# Patient Record
Sex: FEMALE | Race: WHITE | NOT HISPANIC OR LATINO | Employment: OTHER | URBAN - METROPOLITAN AREA
[De-identification: names, ages, dates, MRNs, and addresses within clinical notes are randomized per-mention and may not be internally consistent; named-entity substitution may affect disease eponyms.]

---

## 2017-01-17 RX ORDER — BUSPIRONE HYDROCHLORIDE 5 MG/1
TABLET ORAL
Qty: 60 TABLET | Refills: 0 | Status: SHIPPED | OUTPATIENT
Start: 2017-01-17 | End: 2017-03-19 | Stop reason: SDUPTHER

## 2017-01-18 RX ORDER — ATORVASTATIN CALCIUM 10 MG/1
TABLET, FILM COATED ORAL
Qty: 90 TABLET | Refills: 0 | Status: SHIPPED | OUTPATIENT
Start: 2017-01-18 | End: 2017-04-16 | Stop reason: SDUPTHER

## 2017-01-30 RX ORDER — POTASSIUM CHLORIDE 750 MG/1
CAPSULE, EXTENDED RELEASE ORAL
Qty: 180 CAPSULE | Refills: 0 | Status: SHIPPED | OUTPATIENT
Start: 2017-01-30 | End: 2017-05-12 | Stop reason: SDUPTHER

## 2017-02-22 RX ORDER — METOPROLOL SUCCINATE 50 MG/1
TABLET, EXTENDED RELEASE ORAL
Qty: 90 TABLET | Refills: 0 | Status: SHIPPED | OUTPATIENT
Start: 2017-02-22 | End: 2017-05-21 | Stop reason: SDUPTHER

## 2017-02-22 RX ORDER — LEVOTHYROXINE SODIUM 0.05 MG/1
TABLET ORAL
Qty: 90 TABLET | Refills: 0 | Status: SHIPPED | OUTPATIENT
Start: 2017-02-22 | End: 2017-05-22 | Stop reason: SDUPTHER

## 2017-03-20 RX ORDER — BUSPIRONE HYDROCHLORIDE 5 MG/1
TABLET ORAL
Qty: 60 TABLET | Refills: 0 | Status: SHIPPED | OUTPATIENT
Start: 2017-03-20 | End: 2017-04-16 | Stop reason: SDUPTHER

## 2017-03-30 RX ORDER — LOSARTAN POTASSIUM 50 MG/1
TABLET ORAL
Qty: 90 TABLET | Refills: 0 | Status: SHIPPED | OUTPATIENT
Start: 2017-03-30 | End: 2018-02-12 | Stop reason: SDUPTHER

## 2017-03-30 RX ORDER — SPIRONOLACTONE 25 MG/1
TABLET ORAL
Qty: 90 TABLET | Refills: 0 | Status: SHIPPED | OUTPATIENT
Start: 2017-03-30 | End: 2017-06-26 | Stop reason: SDUPTHER

## 2017-04-17 RX ORDER — ATORVASTATIN CALCIUM 10 MG/1
TABLET, FILM COATED ORAL
Qty: 90 TABLET | Refills: 0 | Status: SHIPPED | OUTPATIENT
Start: 2017-04-17 | End: 2017-07-14 | Stop reason: SDUPTHER

## 2017-04-17 RX ORDER — BUSPIRONE HYDROCHLORIDE 5 MG/1
TABLET ORAL
Qty: 60 TABLET | Refills: 0 | Status: SHIPPED | OUTPATIENT
Start: 2017-04-17 | End: 2017-05-15 | Stop reason: SDUPTHER

## 2017-04-27 ENCOUNTER — OFFICE VISIT (OUTPATIENT)
Dept: INTERNAL MEDICINE | Facility: CLINIC | Age: 72
End: 2017-04-27

## 2017-04-27 VITALS
RESPIRATION RATE: 16 BRPM | BODY MASS INDEX: 21.66 KG/M2 | WEIGHT: 130 LBS | TEMPERATURE: 98.9 F | HEART RATE: 68 BPM | HEIGHT: 65 IN | OXYGEN SATURATION: 95 % | SYSTOLIC BLOOD PRESSURE: 110 MMHG | DIASTOLIC BLOOD PRESSURE: 58 MMHG

## 2017-04-27 DIAGNOSIS — J40 BRONCHITIS: Primary | ICD-10-CM

## 2017-04-27 PROCEDURE — 99213 OFFICE O/P EST LOW 20 MIN: CPT | Performed by: NURSE PRACTITIONER

## 2017-04-27 RX ORDER — METHYLPREDNISOLONE 4 MG/1
TABLET ORAL
Qty: 21 TABLET | Refills: 0 | Status: SHIPPED | OUTPATIENT
Start: 2017-04-27 | End: 2017-05-10

## 2017-04-27 RX ORDER — AZITHROMYCIN 250 MG/1
TABLET, FILM COATED ORAL
Qty: 6 TABLET | Refills: 0 | Status: SHIPPED | OUTPATIENT
Start: 2017-04-27 | End: 2017-05-10

## 2017-04-27 NOTE — PROGRESS NOTES
Vitals:    04/27/17 1115   BP: 110/58   Pulse: 68   Resp: 16   Temp: 98.9 °F (37.2 °C)   SpO2: 95%     Last 2 weights    04/27/17  1115   Weight: 130 lb (59 kg)     Social History   Substance Use Topics   • Smoking status: Never Smoker   • Smokeless tobacco: Never Used   • Alcohol use No       Subjective     HPI  Pt presents to office today with 4 days of productive cough, fatigue, feeling run down, wheezing, difficulty sleeping. Pt states her  was also sick about a week ago and dx with PNA. She is afraid she may have gotten it from him. Pt denies fever, URI symptoms, n/v. She has tried robitussin dm OTC without much relief,     The following portions of the patient's history were reviewed and updated as appropriate: allergies, current medications, past medical history, past social history and problem list.    Review of Systems   Constitutional: Positive for fatigue.   Respiratory: Positive for cough (productive) and wheezing.    Cardiovascular: Negative.        Objective     Physical Exam   Constitutional: She is oriented to person, place, and time. She appears well-developed and well-nourished. She has a sickly appearance.   HENT:   Head: Normocephalic and atraumatic.   Neck: Normal range of motion.   Cardiovascular: Normal rate, regular rhythm and normal heart sounds.    Pulmonary/Chest: Effort normal. She has wheezes (inspriatory) in the right upper field, the right middle field, the left upper field and the left middle field.   Musculoskeletal: Normal range of motion.   Neurological: She is alert and oriented to person, place, and time.   Nursing note and vitals reviewed.      Assessment/Plan   Karly was seen today for cough and uri.    Diagnoses and all orders for this visit:    Bronchitis    Other orders  -     azithromycin (ZITHROMAX) 250 MG tablet; Take 2 tablets the first day, then 1 tablet daily for 4 days.  -     MethylPREDNISolone (MEDROL, ALEKSANDR,) 4 MG tablet; Take as directed on package  instructions.         -antibiotics, steroid pack, and cheratussin ac 10 mg po q4-6hr prn cough signed by johninger  -rest, increase fluid intake  -cont home meds  -FU prn or if symptoms persist/worsen

## 2017-05-10 ENCOUNTER — OFFICE VISIT (OUTPATIENT)
Dept: INTERNAL MEDICINE | Facility: CLINIC | Age: 72
End: 2017-05-10

## 2017-05-10 VITALS
TEMPERATURE: 98.3 F | BODY MASS INDEX: 21.83 KG/M2 | HEIGHT: 65 IN | RESPIRATION RATE: 16 BRPM | DIASTOLIC BLOOD PRESSURE: 70 MMHG | WEIGHT: 131 LBS | SYSTOLIC BLOOD PRESSURE: 120 MMHG | OXYGEN SATURATION: 99 % | HEART RATE: 62 BPM

## 2017-05-10 DIAGNOSIS — E03.9 ACQUIRED HYPOTHYROIDISM: ICD-10-CM

## 2017-05-10 DIAGNOSIS — M85.80 OSTEOPENIA: ICD-10-CM

## 2017-05-10 DIAGNOSIS — E78.5 HYPERLIPIDEMIA, UNSPECIFIED HYPERLIPIDEMIA TYPE: ICD-10-CM

## 2017-05-10 DIAGNOSIS — I10 ESSENTIAL HYPERTENSION: Primary | ICD-10-CM

## 2017-05-10 LAB
ALBUMIN SERPL-MCNC: 4.1 G/DL (ref 3.5–5.2)
ALBUMIN/GLOB SERPL: 1.6 G/DL
ALP SERPL-CCNC: 75 U/L (ref 39–117)
ALT SERPL-CCNC: 11 U/L (ref 1–33)
APPEARANCE UR: CLEAR
AST SERPL-CCNC: 19 U/L (ref 1–32)
BACTERIA #/AREA URNS HPF: NORMAL /HPF
BILIRUB SERPL-MCNC: 0.6 MG/DL (ref 0.1–1.2)
BILIRUB UR QL STRIP: NEGATIVE
BUN SERPL-MCNC: 21 MG/DL (ref 8–23)
BUN/CREAT SERPL: 22.8 (ref 7–25)
CALCIUM SERPL-MCNC: 9.7 MG/DL (ref 8.6–10.5)
CASTS URNS MICRO: NORMAL
CHLORIDE SERPL-SCNC: 95 MMOL/L (ref 98–107)
CHOLEST SERPL-MCNC: 154 MG/DL (ref 0–200)
CO2 SERPL-SCNC: 27.8 MMOL/L (ref 22–29)
COLOR UR: YELLOW
CREAT SERPL-MCNC: 0.92 MG/DL (ref 0.57–1)
EPI CELLS #/AREA URNS HPF: NORMAL /HPF
GLOBULIN SER CALC-MCNC: 2.6 GM/DL
GLUCOSE SERPL-MCNC: 94 MG/DL (ref 65–99)
GLUCOSE UR QL: NEGATIVE
HDLC SERPL-MCNC: 68 MG/DL (ref 40–60)
HGB UR QL STRIP: NEGATIVE
KETONES UR QL STRIP: NEGATIVE
LDLC SERPL CALC-MCNC: 77 MG/DL (ref 0–100)
LEUKOCYTE ESTERASE UR QL STRIP: NEGATIVE
NITRITE UR QL STRIP: NEGATIVE
PH UR STRIP: 7.5 [PH] (ref 5–8)
POTASSIUM SERPL-SCNC: 4.6 MMOL/L (ref 3.5–5.2)
PROT SERPL-MCNC: 6.7 G/DL (ref 6–8.5)
PROT UR QL STRIP: NEGATIVE
RBC #/AREA URNS HPF: NORMAL /HPF
SODIUM SERPL-SCNC: 134 MMOL/L (ref 136–145)
SP GR UR: 1.01 (ref 1–1.03)
TRIGL SERPL-MCNC: 46 MG/DL (ref 0–150)
TSH SERPL DL<=0.005 MIU/L-ACNC: 2.07 MIU/ML (ref 0.27–4.2)
UROBILINOGEN UR STRIP-MCNC: (no result) MG/DL
VLDLC SERPL CALC-MCNC: 9.2 MG/DL (ref 5–40)
WBC #/AREA URNS HPF: NORMAL /HPF

## 2017-05-10 PROCEDURE — 99213 OFFICE O/P EST LOW 20 MIN: CPT | Performed by: INTERNAL MEDICINE

## 2017-05-12 RX ORDER — POTASSIUM CHLORIDE 750 MG/1
CAPSULE, EXTENDED RELEASE ORAL
Qty: 180 CAPSULE | Refills: 0 | Status: SHIPPED | OUTPATIENT
Start: 2017-05-12 | End: 2017-05-26

## 2017-05-15 RX ORDER — BUSPIRONE HYDROCHLORIDE 5 MG/1
TABLET ORAL
Qty: 60 TABLET | Refills: 0 | Status: SHIPPED | OUTPATIENT
Start: 2017-05-15 | End: 2017-06-12 | Stop reason: SDUPTHER

## 2017-05-22 RX ORDER — LEVOTHYROXINE SODIUM 0.05 MG/1
TABLET ORAL
Qty: 90 TABLET | Refills: 0 | Status: SHIPPED | OUTPATIENT
Start: 2017-05-22 | End: 2017-08-18 | Stop reason: SDUPTHER

## 2017-05-22 RX ORDER — METOPROLOL SUCCINATE 50 MG/1
TABLET, EXTENDED RELEASE ORAL
Qty: 90 TABLET | Refills: 0 | Status: SHIPPED | OUTPATIENT
Start: 2017-05-22 | End: 2017-08-18 | Stop reason: SDUPTHER

## 2017-05-26 RX ORDER — POTASSIUM CHLORIDE 750 MG/1
10 TABLET, FILM COATED, EXTENDED RELEASE ORAL 2 TIMES DAILY
Qty: 180 TABLET | Refills: 1 | Status: SHIPPED | OUTPATIENT
Start: 2017-05-26 | End: 2017-11-20 | Stop reason: SDUPTHER

## 2017-06-12 RX ORDER — BUSPIRONE HYDROCHLORIDE 5 MG/1
TABLET ORAL
Qty: 60 TABLET | Refills: 0 | Status: SHIPPED | OUTPATIENT
Start: 2017-06-12 | End: 2018-01-22 | Stop reason: SDUPTHER

## 2017-06-26 RX ORDER — SPIRONOLACTONE 25 MG/1
TABLET ORAL
Qty: 90 TABLET | Refills: 0 | Status: SHIPPED | OUTPATIENT
Start: 2017-06-26 | End: 2017-09-23 | Stop reason: SDUPTHER

## 2017-06-26 RX ORDER — LOSARTAN POTASSIUM 50 MG/1
TABLET ORAL
Qty: 90 TABLET | Refills: 0 | Status: SHIPPED | OUTPATIENT
Start: 2017-06-26 | End: 2018-02-12 | Stop reason: SDUPTHER

## 2017-07-14 RX ORDER — ATORVASTATIN CALCIUM 10 MG/1
TABLET, FILM COATED ORAL
Qty: 90 TABLET | Refills: 0 | Status: SHIPPED | OUTPATIENT
Start: 2017-07-14 | End: 2017-10-11 | Stop reason: SDUPTHER

## 2017-07-17 ENCOUNTER — TELEPHONE (OUTPATIENT)
Dept: INTERNAL MEDICINE | Facility: CLINIC | Age: 72
End: 2017-07-17

## 2017-07-17 RX ORDER — AMOXICILLIN 500 MG/1
500 CAPSULE ORAL 3 TIMES DAILY
Qty: 30 CAPSULE | Refills: 0 | Status: SHIPPED | OUTPATIENT
Start: 2017-07-17 | End: 2017-11-15

## 2017-07-17 NOTE — TELEPHONE ENCOUNTER
Pt called and thinks she has a sinus infection and would like to know if we could send something in. Please advise.

## 2017-08-09 RX ORDER — POTASSIUM CHLORIDE 750 MG/1
CAPSULE, EXTENDED RELEASE ORAL
Qty: 180 CAPSULE | Refills: 0 | Status: SHIPPED | OUTPATIENT
Start: 2017-08-09 | End: 2018-02-12 | Stop reason: SDUPTHER

## 2017-08-18 RX ORDER — METOPROLOL SUCCINATE 50 MG/1
TABLET, EXTENDED RELEASE ORAL
Qty: 90 TABLET | Refills: 0 | Status: SHIPPED | OUTPATIENT
Start: 2017-08-18 | End: 2017-11-15 | Stop reason: SDUPTHER

## 2017-08-18 RX ORDER — LEVOTHYROXINE SODIUM 0.05 MG/1
TABLET ORAL
Qty: 90 TABLET | Refills: 0 | Status: SHIPPED | OUTPATIENT
Start: 2017-08-18 | End: 2017-11-15 | Stop reason: SDUPTHER

## 2017-08-20 ENCOUNTER — APPOINTMENT (OUTPATIENT)
Dept: GENERAL RADIOLOGY | Facility: HOSPITAL | Age: 72
End: 2017-08-20

## 2017-08-20 ENCOUNTER — HOSPITAL ENCOUNTER (EMERGENCY)
Facility: HOSPITAL | Age: 72
Discharge: HOME | End: 2017-08-20
Attending: EMERGENCY MEDICINE | Admitting: EMERGENCY MEDICINE

## 2017-08-20 VITALS
OXYGEN SATURATION: 97 % | RESPIRATION RATE: 17 BRPM | HEART RATE: 72 BPM | TEMPERATURE: 98.5 F | BODY MASS INDEX: 20.89 KG/M2 | HEIGHT: 66 IN | SYSTOLIC BLOOD PRESSURE: 152 MMHG | DIASTOLIC BLOOD PRESSURE: 73 MMHG | WEIGHT: 130 LBS

## 2017-08-20 DIAGNOSIS — J40 BRONCHITIS: Primary | ICD-10-CM

## 2017-08-20 LAB
ALBUMIN SERPL-MCNC: 4.5 G/DL (ref 3.5–5.2)
ALBUMIN/GLOB SERPL: 1.4 G/DL
ALP SERPL-CCNC: 83 U/L (ref 39–117)
ALT SERPL W P-5'-P-CCNC: 9 U/L (ref 1–33)
ANION GAP SERPL CALCULATED.3IONS-SCNC: 13.6 MMOL/L
AST SERPL-CCNC: 20 U/L (ref 1–32)
BASOPHILS # BLD AUTO: 0.02 10*3/MM3 (ref 0–0.2)
BASOPHILS NFR BLD AUTO: 0.2 % (ref 0–1.5)
BILIRUB SERPL-MCNC: 0.8 MG/DL (ref 0.1–1.2)
BUN BLD-MCNC: 18 MG/DL (ref 8–23)
BUN/CREAT SERPL: 20 (ref 7–25)
CALCIUM SPEC-SCNC: 10.3 MG/DL (ref 8.6–10.5)
CHLORIDE SERPL-SCNC: 94 MMOL/L (ref 98–107)
CO2 SERPL-SCNC: 25.4 MMOL/L (ref 22–29)
CREAT BLD-MCNC: 0.9 MG/DL (ref 0.57–1)
DEPRECATED RDW RBC AUTO: 40.1 FL (ref 37–54)
EOSINOPHIL # BLD AUTO: 0.05 10*3/MM3 (ref 0–0.7)
EOSINOPHIL NFR BLD AUTO: 0.6 % (ref 0.3–6.2)
ERYTHROCYTE [DISTWIDTH] IN BLOOD BY AUTOMATED COUNT: 12.4 % (ref 11.7–13)
GFR SERPL CREATININE-BSD FRML MDRD: 62 ML/MIN/1.73
GLOBULIN UR ELPH-MCNC: 3.2 GM/DL
GLUCOSE BLD-MCNC: 109 MG/DL (ref 65–99)
HCT VFR BLD AUTO: 38.6 % (ref 35.6–45.5)
HGB BLD-MCNC: 13.4 G/DL (ref 11.9–15.5)
HOLD SPECIMEN: NORMAL
HOLD SPECIMEN: NORMAL
IMM GRANULOCYTES # BLD: 0.03 10*3/MM3 (ref 0–0.03)
IMM GRANULOCYTES NFR BLD: 0.4 % (ref 0–0.5)
LYMPHOCYTES # BLD AUTO: 0.95 10*3/MM3 (ref 0.9–4.8)
LYMPHOCYTES NFR BLD AUTO: 11.8 % (ref 19.6–45.3)
MCH RBC QN AUTO: 30.9 PG (ref 26.9–32)
MCHC RBC AUTO-ENTMCNC: 34.7 G/DL (ref 32.4–36.3)
MCV RBC AUTO: 88.9 FL (ref 80.5–98.2)
MONOCYTES # BLD AUTO: 0.69 10*3/MM3 (ref 0.2–1.2)
MONOCYTES NFR BLD AUTO: 8.6 % (ref 5–12)
NEUTROPHILS # BLD AUTO: 6.29 10*3/MM3 (ref 1.9–8.1)
NEUTROPHILS NFR BLD AUTO: 78.4 % (ref 42.7–76)
NT-PROBNP SERPL-MCNC: 553.1 PG/ML (ref 0–900)
PLATELET # BLD AUTO: 162 10*3/MM3 (ref 140–500)
PMV BLD AUTO: 11.1 FL (ref 6–12)
POTASSIUM BLD-SCNC: 4.4 MMOL/L (ref 3.5–5.2)
PROT SERPL-MCNC: 7.7 G/DL (ref 6–8.5)
RBC # BLD AUTO: 4.34 10*6/MM3 (ref 3.9–5.2)
SODIUM BLD-SCNC: 133 MMOL/L (ref 136–145)
TROPONIN T SERPL-MCNC: <0.01 NG/ML (ref 0–0.03)
WBC NRBC COR # BLD: 8.03 10*3/MM3 (ref 4.5–10.7)
WHOLE BLOOD HOLD SPECIMEN: NORMAL
WHOLE BLOOD HOLD SPECIMEN: NORMAL

## 2017-08-20 PROCEDURE — 80053 COMPREHEN METABOLIC PANEL: CPT | Performed by: EMERGENCY MEDICINE

## 2017-08-20 PROCEDURE — 93005 ELECTROCARDIOGRAM TRACING: CPT | Performed by: EMERGENCY MEDICINE

## 2017-08-20 PROCEDURE — 99284 EMERGENCY DEPT VISIT MOD MDM: CPT

## 2017-08-20 PROCEDURE — 83880 ASSAY OF NATRIURETIC PEPTIDE: CPT | Performed by: EMERGENCY MEDICINE

## 2017-08-20 PROCEDURE — 84484 ASSAY OF TROPONIN QUANT: CPT | Performed by: EMERGENCY MEDICINE

## 2017-08-20 PROCEDURE — 36415 COLL VENOUS BLD VENIPUNCTURE: CPT | Performed by: EMERGENCY MEDICINE

## 2017-08-20 PROCEDURE — 93010 ELECTROCARDIOGRAM REPORT: CPT | Performed by: INTERNAL MEDICINE

## 2017-08-20 PROCEDURE — 71020 HC CHEST PA AND LATERAL: CPT

## 2017-08-20 PROCEDURE — 85025 COMPLETE CBC W/AUTO DIFF WBC: CPT | Performed by: EMERGENCY MEDICINE

## 2017-08-20 RX ORDER — METHYLPREDNISOLONE 4 MG/1
TABLET ORAL
Qty: 21 TABLET | Refills: 0 | Status: SHIPPED | OUTPATIENT
Start: 2017-08-20 | End: 2017-11-15

## 2017-08-20 RX ORDER — SODIUM CHLORIDE 0.9 % (FLUSH) 0.9 %
10 SYRINGE (ML) INJECTION AS NEEDED
Status: DISCONTINUED | OUTPATIENT
Start: 2017-08-20 | End: 2017-08-20 | Stop reason: HOSPADM

## 2017-08-20 RX ORDER — ACYCLOVIR 200 MG/1
200 CAPSULE ORAL AS NEEDED
COMMUNITY
End: 2018-01-16 | Stop reason: SDUPTHER

## 2017-08-20 RX ORDER — LEVOFLOXACIN 500 MG/1
500 TABLET, FILM COATED ORAL DAILY
Qty: 10 TABLET | Refills: 0 | Status: SHIPPED | OUTPATIENT
Start: 2017-08-20 | End: 2018-02-12

## 2017-08-20 NOTE — ED PROVIDER NOTES
EMERGENCY DEPARTMENT ENCOUNTER    CHIEF COMPLAINT  Chief Complaint: SOA  History given by: Pt  History limited by: Nothing  Room Number: 25/25  PMD: Landry Hoffman MD      HPI:  Pt is a 71 y.o. female who presents complaining of SOB for the last two days. Pt also complains of a productive cough with green/white sputum and CP (with cough and deep breathing) but denies fever, N/V/D, sore throat or HA. Pt states that she finished amoxicillin for a sinus infection 2-3 weeks ago.    Duration:  24 hours  Onset: gradual  Timing: constant  Quality: SOA  Intensity/Severity: moderate  Progression: unchanged  Associated Symptoms: productive cough, CP  Aggravating Factors: None   Alleviating Factors: none  Previous Episodes: none stated  Treatment before arrival: Pt states that she finished amoxicillin for a sinus infection 2-3 weeks ago.    PAST MEDICAL HISTORY  Active Ambulatory Problems     Diagnosis Date Noted   • Anxiety 04/17/2016   • Bronchiectasis without complication 04/17/2016   • Hyperlipidemia 04/17/2016   • Hypertension 04/17/2016   • Osteopenia 04/17/2016   • Hypothyroidism 04/17/2016   • Health care maintenance 04/17/2016     Resolved Ambulatory Problems     Diagnosis Date Noted   • No Resolved Ambulatory Problems     Past Medical History:   Diagnosis Date   • Maxillary sinusitis 10/08/2015   • Pneumonia        PAST SURGICAL HISTORY  Past Surgical History:   Procedure Laterality Date   • APPENDECTOMY     • TUBAL ABDOMINAL LIGATION         FAMILY HISTORY  Family History   Problem Relation Age of Onset   • Heart attack Mother      Acute Myocardial Infarction   • Heart failure Mother      Congestive Heart Failure   • Coronary artery disease Brother      CABG       SOCIAL HISTORY  Social History     Social History   • Marital status:      Spouse name: N/A   • Number of children: N/A   • Years of education: N/A     Occupational History   • Not on file.     Social History Main Topics   • Smoking status: Never  Smoker   • Smokeless tobacco: Never Used   • Alcohol use No   • Drug use: No   • Sexual activity: Not on file     Other Topics Concern   • Not on file     Social History Narrative   • No narrative on file       ALLERGIES  Eggs or egg-derived products and Sulfa antibiotics    REVIEW OF SYSTEMS  Review of Systems   Constitutional: Negative for fever.   HENT: Negative for sore throat.    Eyes: Negative.    Respiratory: Positive for cough (productive with white/green sputum) and shortness of breath.    Cardiovascular: Positive for chest pain (only with cough and deep breathing).   Gastrointestinal: Negative for abdominal pain, diarrhea and vomiting.   Genitourinary: Negative for dysuria.   Musculoskeletal: Negative for neck pain.   Skin: Negative for rash.   Allergic/Immunologic: Negative.    Neurological: Negative for weakness, numbness and headaches.   Hematological: Negative.    Psychiatric/Behavioral: Negative.    All other systems reviewed and are negative.      PHYSICAL EXAM  ED Triage Vitals   Temp Heart Rate Resp BP SpO2   08/20/17 1037 08/20/17 1038 08/20/17 1038 08/20/17 1038 08/20/17 1037   98.5 °F (36.9 °C) 84 18 136/77 100 %      Temp src Heart Rate Source Patient Position BP Location FiO2 (%)   08/20/17 1037 08/20/17 1038 -- -- --   Tympanic Monitor          Physical Exam   Constitutional: She is oriented to person, place, and time and well-developed, well-nourished, and in no distress. No distress.   HENT:   Head: Normocephalic and atraumatic.   Mouth/Throat: Oropharynx is clear and moist.   Eyes: Conjunctivae and EOM are normal. Pupils are equal, round, and reactive to light.   Neck: Normal range of motion. Neck supple.   Cardiovascular: Normal rate, regular rhythm and normal heart sounds.    Pulmonary/Chest: Effort normal. No respiratory distress. She has wheezes in the right lower field.   Abdominal: Soft. There is no tenderness. There is no rebound and no guarding.   Musculoskeletal: Normal range of  motion. She exhibits no edema.   Neurological: She is alert and oriented to person, place, and time. She has normal sensation and normal strength.   Skin: Skin is warm and dry. No rash noted.   Psychiatric: Mood and affect normal.   Nursing note and vitals reviewed.      LAB RESULTS  Lab Results (last 24 hours)     Procedure Component Value Units Date/Time    CBC & Differential [[id number]] Collected:  08/20/17 1059    Specimen:  Blood Updated:  08/20/17 1112    Narrative:       The following orders were created for panel order CBC & Differential.  Procedure                               Abnormality         Status                     ---------                               -----------         ------                     CBC Auto Differential[[id number]]        Abnormal            Final result                 Please view results for these tests on the individual orders.    Comprehensive Metabolic Panel [[id number]]  (Abnormal) Collected:  08/20/17 1059    Specimen:  Blood Updated:  08/20/17 1130     Glucose 109 (H) mg/dL      BUN 18 mg/dL      Creatinine 0.90 mg/dL      Sodium 133 (L) mmol/L      Potassium 4.4 mmol/L      Chloride 94 (L) mmol/L      CO2 25.4 mmol/L      Calcium 10.3 mg/dL      Total Protein 7.7 g/dL      Albumin 4.50 g/dL      ALT (SGPT) 9 U/L      AST (SGOT) 20 U/L      Alkaline Phosphatase 83 U/L      Total Bilirubin 0.8 mg/dL      eGFR Non African Amer 62 mL/min/1.73      Globulin 3.2 gm/dL      A/G Ratio 1.4 g/dL      BUN/Creatinine Ratio 20.0     Anion Gap 13.6 mmol/L     Narrative:       The MDRD GFR formula is only valid for adults with stable renal function between ages 18 and 70.    BNP [[id number]]  (Normal) Collected:  08/20/17 1059    Specimen:  Blood Updated:  08/20/17 1132     proBNP 553.1 pg/mL     Narrative:       Among patients with dyspnea, NT-proBNP is highly sensitive for the detection of acute congestive heart failure. In addition NT-proBNP of <300 pg/ml effectively rules out acute  congestive heart failure with 99% negative predictive value.    Troponin [[id number]]  (Normal) Collected:  08/20/17 1059    Specimen:  Blood Updated:  08/20/17 1132     Troponin T <0.010 ng/mL     Narrative:       Troponin T Reference Ranges:  Less than 0.03 ng/mL:    Negative for AMI  0.03 to 0.09 ng/mL:      Indeterminant for AMI  Greater than 0.09 ng/mL: Positive for AMI    CBC Auto Differential [[id number]]  (Abnormal) Collected:  08/20/17 1059    Specimen:  Blood Updated:  08/20/17 1112     WBC 8.03 10*3/mm3      RBC 4.34 10*6/mm3      Hemoglobin 13.4 g/dL      Hematocrit 38.6 %      MCV 88.9 fL      MCH 30.9 pg      MCHC 34.7 g/dL      RDW 12.4 %      RDW-SD 40.1 fl      MPV 11.1 fL      Platelets 162 10*3/mm3      Neutrophil % 78.4 (H) %      Lymphocyte % 11.8 (L) %      Monocyte % 8.6 %      Eosinophil % 0.6 %      Basophil % 0.2 %      Immature Grans % 0.4 %      Neutrophils, Absolute 6.29 10*3/mm3      Lymphocytes, Absolute 0.95 10*3/mm3      Monocytes, Absolute 0.69 10*3/mm3      Eosinophils, Absolute 0.05 10*3/mm3      Basophils, Absolute 0.02 10*3/mm3      Immature Grans, Absolute 0.03 10*3/mm3           I ordered the above labs and reviewed the results    RADIOLOGY  XR Chest 2 View   Preliminary Result   Stable pulmonary hyperinflation without evidence for   superimposed acute process.               I ordered the above noted radiological studies. Interpreted by radiologist. Reviewed by me in PACS.       PROCEDURES  Procedures    EKG           EKG time: 1132  Rhythm/Rate: nsr, 74  P waves and NJ: L atrial enlargement  QRS, axis: normal   ST and T waves: normal     Interpreted Contemporaneously by me, independently viewed   No prior records for comparison.    Interpretation limited by poor quality of EKG      PROGRESS AND CONSULTS  ED Course     1039 - EKG ordered for further evaluation.    1040 - CXR and lab work ordered for further evaluation.     1130 - Rechecked pt. Pt is resting comfortably. Informed  pt of her unremarkable lab results including troponin. D/w pt plan to discharge home with a prescription for abx for her bronchitis and instructed her to follow up with PCP. Pt understands and agrees with plan. All questions answered.    1317- Discussed the pt's case with Dr. Evangelista(pulmonology) who agrees with the plan to discharge the pt home.    MEDICAL DECISION MAKING  Results were reviewed/discussed with the patient and they were also made aware of online access. Pt also made aware that some labs, such as cultures, will not be resulted during ER visit and follow up with PMD is necessary.     MDM  Number of Diagnoses or Management Options  Bronchitis:      Amount and/or Complexity of Data Reviewed  Clinical lab tests: ordered and reviewed (Troponin - negative  WBC - 8.03  proBNP - 553.1)  Tests in the radiology section of CPT®: ordered and reviewed (CXR shows nothing acute)  Tests in the medicine section of CPT®: ordered and reviewed (See EKG note.)  Decide to obtain previous medical records or to obtain history from someone other than the patient: yes  Review and summarize past medical records: yes (Pt received a prescription for amoxicillin 500mg TID on 7-17-17 for sinus infection.)  Discuss the patient with other providers: yes (D/w Dr. Evangelista (pulmonology))  Independent visualization of images, tracings, or specimens: yes           DIAGNOSIS  Final diagnoses:   Bronchitis       DISPOSITION  DISCHARGE    Patient discharged in stable condition.    Reviewed implications of results, diagnosis, meds, responsibility to follow up, warning signs and symptoms of possible worsening, potential complications and reasons to return to ER.    Patient/Family voiced understanding of above instructions.    Discussed plan for discharge, as there is no emergent indication for admission.  Pt/family is agreeable and understands need for follow up and repeat testing.  Pt is aware that discharge does not mean that nothing is wrong  but it indicates no emergency is present that requires admission and they must continue care with follow-up as given below or physician of their choice.     FOLLOW-UP  Landry Hoffman MD  [address]  [place] [address]  [phone]          Ti Martinez MD  [address]  [place] [address]  [phone]               Medication List      New Prescriptions          levoFLOXacin 500 MG tablet   Commonly known as:  LEVAQUIN   Take 1 tablet by mouth Daily.       MethylPREDNISolone 4 MG tablet   Commonly known as:  MEDROL (ALEKSANDR)   Take as directed on package instructions.         Changed          acyclovir 200 MG capsule   Commonly known as:  ZOVIRAX   What changed:  Another medication with the same name was removed. Continue   taking this medication, and follow the directions you see here.       losartan 50 MG tablet   Commonly known as:  COZAAR   TAKE 1 TABLET BY MOUTH EVERY DAY   What changed:  Another medication with the same name was removed. Continue   taking this medication, and follow the directions you see here.       potassium chloride 10 MEQ CR tablet   Commonly known as:  KLOR-CON   Take 1 tablet by mouth 2 (Two) Times a Day.   What changed:  Another medication with the same name was removed. Continue   taking this medication, and follow the directions you see here.         Stop          amoxicillin 500 MG capsule   Commonly known as:  AMOXIL       meclizine 12.5 MG tablet   Commonly known as:  ANTIVERT               Latest Documented Vital Signs:  As of 4:15 PM  BP- 152/73 HR- 72 Temp- 98.5 °F (36.9 °C) (Tympanic) O2 sat- 97%    --  Documentation assistance provided by ellie Richardson and Bud Baldwin for Dr. Han.  Information recorded by the markos was done at my direction and has been verified and validated by me.     Bud Baldwin  08/20/17 1617     Radha Richardson  08/20/17 2011       Timbo Han MD  08/21/17 1319

## 2017-08-24 ENCOUNTER — TELEPHONE (OUTPATIENT)
Dept: SOCIAL WORK | Facility: HOSPITAL | Age: 72
End: 2017-08-24

## 2017-09-25 RX ORDER — LOSARTAN POTASSIUM 50 MG/1
TABLET ORAL
Qty: 90 TABLET | Refills: 0 | Status: SHIPPED | OUTPATIENT
Start: 2017-09-25 | End: 2018-02-12 | Stop reason: SDUPTHER

## 2017-09-25 RX ORDER — SPIRONOLACTONE 25 MG/1
TABLET ORAL
Qty: 90 TABLET | Refills: 0 | Status: SHIPPED | OUTPATIENT
Start: 2017-09-25 | End: 2017-12-22 | Stop reason: SDUPTHER

## 2017-10-02 RX ORDER — ACYCLOVIR 200 MG/1
CAPSULE ORAL
Qty: 25 CAPSULE | Refills: 0 | Status: SHIPPED | OUTPATIENT
Start: 2017-10-02 | End: 2017-12-14 | Stop reason: SDUPTHER

## 2017-10-11 RX ORDER — ATORVASTATIN CALCIUM 10 MG/1
TABLET, FILM COATED ORAL
Qty: 90 TABLET | Refills: 0 | Status: SHIPPED | OUTPATIENT
Start: 2017-10-11 | End: 2018-01-06 | Stop reason: SDUPTHER

## 2017-11-15 ENCOUNTER — OFFICE VISIT (OUTPATIENT)
Dept: INTERNAL MEDICINE | Facility: CLINIC | Age: 72
End: 2017-11-15

## 2017-11-15 VITALS
WEIGHT: 130 LBS | DIASTOLIC BLOOD PRESSURE: 86 MMHG | TEMPERATURE: 98.6 F | BODY MASS INDEX: 20.89 KG/M2 | SYSTOLIC BLOOD PRESSURE: 132 MMHG | RESPIRATION RATE: 16 BRPM | HEIGHT: 66 IN | OXYGEN SATURATION: 97 % | HEART RATE: 62 BPM

## 2017-11-15 DIAGNOSIS — I10 ESSENTIAL HYPERTENSION: ICD-10-CM

## 2017-11-15 DIAGNOSIS — M85.80 OSTEOPENIA, UNSPECIFIED LOCATION: ICD-10-CM

## 2017-11-15 DIAGNOSIS — E03.9 ACQUIRED HYPOTHYROIDISM: ICD-10-CM

## 2017-11-15 DIAGNOSIS — E78.5 HYPERLIPIDEMIA, UNSPECIFIED HYPERLIPIDEMIA TYPE: ICD-10-CM

## 2017-11-15 DIAGNOSIS — J47.9 BRONCHIECTASIS WITHOUT COMPLICATION (HCC): Primary | ICD-10-CM

## 2017-11-15 LAB
25(OH)D3+25(OH)D2 SERPL-MCNC: 64.8 NG/ML (ref 30–100)
ALBUMIN SERPL-MCNC: 4.4 G/DL (ref 3.5–5.2)
ALBUMIN/GLOB SERPL: 1.9 G/DL
ALP SERPL-CCNC: 80 U/L (ref 39–117)
ALT SERPL-CCNC: 11 U/L (ref 1–33)
APPEARANCE UR: CLEAR
AST SERPL-CCNC: 23 U/L (ref 1–32)
BACTERIA #/AREA URNS HPF: NORMAL /HPF
BASOPHILS # BLD AUTO: 0.02 10*3/MM3 (ref 0–0.2)
BASOPHILS NFR BLD AUTO: 0.3 % (ref 0–1.5)
BILIRUB SERPL-MCNC: 0.7 MG/DL (ref 0.1–1.2)
BILIRUB UR QL STRIP: NEGATIVE
BUN SERPL-MCNC: 21 MG/DL (ref 8–23)
BUN/CREAT SERPL: 21.2 (ref 7–25)
CALCIUM SERPL-MCNC: 9.9 MG/DL (ref 8.6–10.5)
CASTS URNS MICRO: NORMAL
CHLORIDE SERPL-SCNC: 96 MMOL/L (ref 98–107)
CHOLEST SERPL-MCNC: 158 MG/DL (ref 0–200)
CO2 SERPL-SCNC: 26.8 MMOL/L (ref 22–29)
COLOR UR: YELLOW
CREAT SERPL-MCNC: 0.99 MG/DL (ref 0.57–1)
EOSINOPHIL # BLD AUTO: 0.05 10*3/MM3 (ref 0–0.7)
EOSINOPHIL NFR BLD AUTO: 0.9 % (ref 0.3–6.2)
EPI CELLS #/AREA URNS HPF: NORMAL /HPF
ERYTHROCYTE [DISTWIDTH] IN BLOOD BY AUTOMATED COUNT: 13.4 % (ref 11.7–13)
GFR SERPLBLD CREATININE-BSD FMLA CKD-EPI: 55 ML/MIN/1.73
GFR SERPLBLD CREATININE-BSD FMLA CKD-EPI: 67 ML/MIN/1.73
GLOBULIN SER CALC-MCNC: 2.3 GM/DL
GLUCOSE SERPL-MCNC: 99 MG/DL (ref 65–99)
GLUCOSE UR QL: NEGATIVE
HCT VFR BLD AUTO: 37.8 % (ref 35.6–45.5)
HDLC SERPL-MCNC: 84 MG/DL (ref 40–60)
HGB BLD-MCNC: 13 G/DL (ref 11.9–15.5)
HGB UR QL STRIP: NEGATIVE
IMM GRANULOCYTES # BLD: 0 10*3/MM3 (ref 0–0.03)
IMM GRANULOCYTES NFR BLD: 0 % (ref 0–0.5)
KETONES UR QL STRIP: NEGATIVE
LDLC SERPL CALC-MCNC: 67 MG/DL (ref 0–100)
LEUKOCYTE ESTERASE UR QL STRIP: NEGATIVE
LYMPHOCYTES # BLD AUTO: 1.32 10*3/MM3 (ref 0.9–4.8)
LYMPHOCYTES NFR BLD AUTO: 22.8 % (ref 19.6–45.3)
MCH RBC QN AUTO: 30.2 PG (ref 26.9–32)
MCHC RBC AUTO-ENTMCNC: 34.4 G/DL (ref 32.4–36.3)
MCV RBC AUTO: 87.9 FL (ref 80.5–98.2)
MONOCYTES # BLD AUTO: 0.52 10*3/MM3 (ref 0.2–1.2)
MONOCYTES NFR BLD AUTO: 9 % (ref 5–12)
NEUTROPHILS # BLD AUTO: 3.88 10*3/MM3 (ref 1.9–8.1)
NEUTROPHILS NFR BLD AUTO: 67 % (ref 42.7–76)
NITRITE UR QL STRIP: NEGATIVE
PH UR STRIP: 8 [PH] (ref 5–8)
PLATELET # BLD AUTO: 195 10*3/MM3 (ref 140–500)
POTASSIUM SERPL-SCNC: 5.2 MMOL/L (ref 3.5–5.2)
PROT SERPL-MCNC: 6.7 G/DL (ref 6–8.5)
PROT UR QL STRIP: NEGATIVE
RBC # BLD AUTO: 4.3 10*6/MM3 (ref 3.9–5.2)
RBC #/AREA URNS HPF: NORMAL /HPF
SODIUM SERPL-SCNC: 133 MMOL/L (ref 136–145)
SP GR UR: 1.01 (ref 1–1.03)
T4 FREE SERPL-MCNC: 1.28 NG/DL (ref 0.93–1.7)
TRIGL SERPL-MCNC: 36 MG/DL (ref 0–150)
TSH SERPL DL<=0.005 MIU/L-ACNC: 2.24 MIU/ML (ref 0.27–4.2)
UROBILINOGEN UR STRIP-MCNC: (no result) MG/DL
VLDLC SERPL CALC-MCNC: 7.2 MG/DL (ref 5–40)
WBC # BLD AUTO: 5.79 10*3/MM3 (ref 4.5–10.7)
WBC #/AREA URNS HPF: NORMAL /HPF

## 2017-11-15 PROCEDURE — 99214 OFFICE O/P EST MOD 30 MIN: CPT | Performed by: INTERNAL MEDICINE

## 2017-11-15 RX ORDER — METOPROLOL SUCCINATE 50 MG/1
50 TABLET, EXTENDED RELEASE ORAL DAILY
Qty: 90 TABLET | Refills: 1 | Status: SHIPPED | OUTPATIENT
Start: 2017-11-15 | End: 2018-05-11 | Stop reason: SDUPTHER

## 2017-11-15 RX ORDER — AMOXICILLIN 500 MG/1
TABLET, FILM COATED ORAL
Qty: 30 TABLET | Refills: 0 | Status: SHIPPED | OUTPATIENT
Start: 2017-11-15 | End: 2018-02-12

## 2017-11-15 RX ORDER — LEVOTHYROXINE SODIUM 0.05 MG/1
50 TABLET ORAL DAILY
Qty: 90 TABLET | Refills: 1 | Status: SHIPPED | OUTPATIENT
Start: 2017-11-15 | End: 2018-05-11 | Stop reason: SDUPTHER

## 2017-11-15 NOTE — PROGRESS NOTES
Mary Mcgrath is a 72 y.o. female.   10/11/2017  Wt Readings from Last 1 Encounters:   11/15/17 130 lb (59 kg)   She was last seen in May 2017, weight 130 then, unchanged.    She returns for follow-up of hypertension, hyperlipidemia, hypothyroidism, osteopenia, and cough    History of Present Illness   She was seen by Dr. rose veliz last month for pulmonary follow-up, complaining of a cough.  She was felt to have an exacerbation of bronchiectasis and was prescribed azithromycin for 7 day course.  She Still complains of having cough with production of greenish sputum.    She has hypertension treated with losartan 50 mg daily, spironolactone 25 mg daily and metoprolol succinate 50 mg daily.  She does not have any cardiac history or symptoms.    She has hypothyroidism treated with levothyroxin 50 µg daily.    She has hyperlipidemia treated with atorvastatin 10 mg daily.    She has osteopenia and takes Caltrate D twice a day.  No falls reported  The following portions of the patient's history were reviewed and updated as appropriate: allergies, current medications, past family history, past medical history, past social history, past surgical history and problem list.    Review of Systems   Constitutional: Negative.    HENT: Negative.    Eyes: Negative.    Respiratory: Positive for cough.    Cardiovascular: Negative.    Endocrine: Negative.    Genitourinary: Negative.    Skin: Negative.    Neurological: Negative.    Hematological: Negative.    Psychiatric/Behavioral: Negative.        Objective   Physical Exam   Constitutional: She appears well-developed and well-nourished.   HENT:   Head: Normocephalic and atraumatic.   Cardiovascular: Normal rate and regular rhythm.  Exam reveals no gallop and no friction rub.    No murmur heard.  Pulmonary/Chest: Effort normal and breath sounds normal. No respiratory distress. She has no wheezes. She has no rales.   Lungs are clear, oxygen saturation 97% on room air at  rest   Abdominal: Soft. Bowel sounds are normal. She exhibits no distension and no mass. There is no tenderness.   Neurological: She is alert.   Skin: Skin is warm.   Psychiatric: Her behavior is normal.   Vitals reviewed.      Assessment/Plan   Karly was seen today for cough, hypertension, hyperlipidemia and hypothyroidism.    Diagnoses and all orders for this visit:    Bronchiectasis without complication  Comments:  We will treat with amoxicillin 500 mg 3 times a day for 10 days  Orders:  -     CBC & Differential    Essential hypertension  Comments:  Continue losartan 50 mg daily, metoprolol succinate 50 mg daily Inspra lactone 25 mg daily  Orders:  -     Comprehensive Metabolic Panel  -     Urinalysis With Microscopic - Urine, Clean Catch    Hyperlipidemia, unspecified hyperlipidemia type  Comments:  Continue atorvastatin 10 mg daily, we will check chemistries and lipids  Orders:  -     Lipid Panel    Acquired hypothyroidism  Comments:  Continue levothyroxin 50 µg daily, will check thyroid levels  Orders:  -     TSH  -     T4, Free    Osteopenia, unspecified location  Comments:  Continue Caltrate D twice a day, will check vitamin D level  Orders:  -     Vitamin D 25 Hydroxy    Other orders  -     amoxicillin (AMOXIL) 500 MG tablet; 01 by mouth 3 times a day until all taken        Schedule office follow-up about 6 months, return sooner if needed                       EMR Dragon/Transcription disclaimer:      Much of this encounter note is an electronic transcription/translation of spoken language to printed text. The electronic translation of spoken language may permit erroneous, or at times, nonsensical words or phrases to be inadvertently transcribed; Although I have reviewed the note for such errors, some may still exist.

## 2017-11-20 RX ORDER — POTASSIUM CHLORIDE 750 MG/1
TABLET, FILM COATED, EXTENDED RELEASE ORAL
Qty: 180 TABLET | Refills: 0 | Status: SHIPPED | OUTPATIENT
Start: 2017-11-20 | End: 2018-02-15 | Stop reason: SDUPTHER

## 2017-12-14 RX ORDER — ACYCLOVIR 200 MG/1
CAPSULE ORAL
Qty: 25 CAPSULE | Refills: 0 | Status: SHIPPED | OUTPATIENT
Start: 2017-12-14 | End: 2018-01-16 | Stop reason: SDUPTHER

## 2017-12-22 RX ORDER — SPIRONOLACTONE 25 MG/1
TABLET ORAL
Qty: 90 TABLET | Refills: 0 | Status: SHIPPED | OUTPATIENT
Start: 2017-12-22 | End: 2018-03-23 | Stop reason: SDUPTHER

## 2017-12-22 RX ORDER — LOSARTAN POTASSIUM 50 MG/1
TABLET ORAL
Qty: 90 TABLET | Refills: 0 | Status: SHIPPED | OUTPATIENT
Start: 2017-12-22 | End: 2018-05-16 | Stop reason: SDUPTHER

## 2018-01-08 RX ORDER — ATORVASTATIN CALCIUM 10 MG/1
TABLET, FILM COATED ORAL
Qty: 90 TABLET | Refills: 0 | Status: SHIPPED | OUTPATIENT
Start: 2018-01-08 | End: 2018-04-05 | Stop reason: SDUPTHER

## 2018-01-09 ENCOUNTER — APPOINTMENT (OUTPATIENT)
Dept: GENERAL RADIOLOGY | Facility: HOSPITAL | Age: 73
End: 2018-01-09

## 2018-01-09 ENCOUNTER — HOSPITAL ENCOUNTER (EMERGENCY)
Facility: HOSPITAL | Age: 73
Discharge: HOME | End: 2018-01-09
Attending: EMERGENCY MEDICINE | Admitting: EMERGENCY MEDICINE

## 2018-01-09 VITALS
DIASTOLIC BLOOD PRESSURE: 75 MMHG | RESPIRATION RATE: 20 BRPM | OXYGEN SATURATION: 98 % | SYSTOLIC BLOOD PRESSURE: 130 MMHG | BODY MASS INDEX: 21.66 KG/M2 | HEART RATE: 82 BPM | TEMPERATURE: 98.4 F | WEIGHT: 130 LBS | HEIGHT: 65 IN

## 2018-01-09 DIAGNOSIS — J47.1 BRONCHIECTASIS WITH ACUTE EXACERBATION (HCC): Primary | ICD-10-CM

## 2018-01-09 LAB
ALBUMIN SERPL-MCNC: 4.1 G/DL (ref 3.5–5.2)
ALBUMIN/GLOB SERPL: 1.2 G/DL
ALP SERPL-CCNC: 85 U/L (ref 39–117)
ALT SERPL W P-5'-P-CCNC: 7 U/L (ref 1–33)
ANION GAP SERPL CALCULATED.3IONS-SCNC: 13.8 MMOL/L
AST SERPL-CCNC: 19 U/L (ref 1–32)
BASOPHILS # BLD AUTO: 0.02 10*3/MM3 (ref 0–0.2)
BASOPHILS NFR BLD AUTO: 0.2 % (ref 0–1.5)
BILIRUB SERPL-MCNC: 1.1 MG/DL (ref 0.1–1.2)
BILIRUB UR QL STRIP: NEGATIVE
BUN BLD-MCNC: 13 MG/DL (ref 8–23)
BUN/CREAT SERPL: 14.4 (ref 7–25)
CALCIUM SPEC-SCNC: 9.5 MG/DL (ref 8.6–10.5)
CHLORIDE SERPL-SCNC: 94 MMOL/L (ref 98–107)
CLARITY UR: CLEAR
CO2 SERPL-SCNC: 26.2 MMOL/L (ref 22–29)
COLOR UR: YELLOW
CREAT BLD-MCNC: 0.9 MG/DL (ref 0.57–1)
DEPRECATED RDW RBC AUTO: 42 FL (ref 37–54)
EOSINOPHIL # BLD AUTO: 0.02 10*3/MM3 (ref 0–0.7)
EOSINOPHIL NFR BLD AUTO: 0.2 % (ref 0.3–6.2)
ERYTHROCYTE [DISTWIDTH] IN BLOOD BY AUTOMATED COUNT: 13.2 % (ref 11.7–13)
FLUAV AG NPH QL: NEGATIVE
FLUBV AG NPH QL IA: NEGATIVE
GFR SERPL CREATININE-BSD FRML MDRD: 61 ML/MIN/1.73
GLOBULIN UR ELPH-MCNC: 3.3 GM/DL
GLUCOSE BLD-MCNC: 98 MG/DL (ref 65–99)
GLUCOSE UR STRIP-MCNC: NEGATIVE MG/DL
HCT VFR BLD AUTO: 36.3 % (ref 35.6–45.5)
HGB BLD-MCNC: 12.7 G/DL (ref 11.9–15.5)
HGB UR QL STRIP.AUTO: NEGATIVE
IMM GRANULOCYTES # BLD: 0.03 10*3/MM3 (ref 0–0.03)
IMM GRANULOCYTES NFR BLD: 0.4 % (ref 0–0.5)
KETONES UR QL STRIP: ABNORMAL
LEUKOCYTE ESTERASE UR QL STRIP.AUTO: NEGATIVE
LYMPHOCYTES # BLD AUTO: 0.74 10*3/MM3 (ref 0.9–4.8)
LYMPHOCYTES NFR BLD AUTO: 9.2 % (ref 19.6–45.3)
MCH RBC QN AUTO: 30.2 PG (ref 26.9–32)
MCHC RBC AUTO-ENTMCNC: 35 G/DL (ref 32.4–36.3)
MCV RBC AUTO: 86.2 FL (ref 80.5–98.2)
MONOCYTES # BLD AUTO: 0.48 10*3/MM3 (ref 0.2–1.2)
MONOCYTES NFR BLD AUTO: 6 % (ref 5–12)
NEUTROPHILS # BLD AUTO: 6.75 10*3/MM3 (ref 1.9–8.1)
NEUTROPHILS NFR BLD AUTO: 84 % (ref 42.7–76)
NITRITE UR QL STRIP: NEGATIVE
NT-PROBNP SERPL-MCNC: 926.6 PG/ML (ref 5–900)
PH UR STRIP.AUTO: 8 [PH] (ref 5–8)
PLATELET # BLD AUTO: 165 10*3/MM3 (ref 140–500)
PMV BLD AUTO: 10.9 FL (ref 6–12)
POTASSIUM BLD-SCNC: 3.6 MMOL/L (ref 3.5–5.2)
PROT SERPL-MCNC: 7.4 G/DL (ref 6–8.5)
PROT UR QL STRIP: NEGATIVE
RBC # BLD AUTO: 4.21 10*6/MM3 (ref 3.9–5.2)
SODIUM BLD-SCNC: 134 MMOL/L (ref 136–145)
SP GR UR STRIP: 1.01 (ref 1–1.03)
TROPONIN T SERPL-MCNC: <0.01 NG/ML (ref 0–0.03)
UROBILINOGEN UR QL STRIP: ABNORMAL
WBC NRBC COR # BLD: 8.04 10*3/MM3 (ref 4.5–10.7)

## 2018-01-09 PROCEDURE — 71046 X-RAY EXAM CHEST 2 VIEWS: CPT

## 2018-01-09 PROCEDURE — 99284 EMERGENCY DEPT VISIT MOD MDM: CPT

## 2018-01-09 PROCEDURE — 93005 ELECTROCARDIOGRAM TRACING: CPT | Performed by: EMERGENCY MEDICINE

## 2018-01-09 PROCEDURE — 80053 COMPREHEN METABOLIC PANEL: CPT | Performed by: EMERGENCY MEDICINE

## 2018-01-09 PROCEDURE — 87804 INFLUENZA ASSAY W/OPTIC: CPT | Performed by: NURSE PRACTITIONER

## 2018-01-09 PROCEDURE — 83880 ASSAY OF NATRIURETIC PEPTIDE: CPT | Performed by: NURSE PRACTITIONER

## 2018-01-09 PROCEDURE — 84484 ASSAY OF TROPONIN QUANT: CPT | Performed by: NURSE PRACTITIONER

## 2018-01-09 PROCEDURE — 81003 URINALYSIS AUTO W/O SCOPE: CPT | Performed by: NURSE PRACTITIONER

## 2018-01-09 PROCEDURE — 93010 ELECTROCARDIOGRAM REPORT: CPT | Performed by: INTERNAL MEDICINE

## 2018-01-09 PROCEDURE — 93005 ELECTROCARDIOGRAM TRACING: CPT

## 2018-01-09 PROCEDURE — 94799 UNLISTED PULMONARY SVC/PX: CPT

## 2018-01-09 PROCEDURE — 85025 COMPLETE CBC W/AUTO DIFF WBC: CPT | Performed by: NURSE PRACTITIONER

## 2018-01-09 PROCEDURE — 94640 AIRWAY INHALATION TREATMENT: CPT

## 2018-01-09 RX ORDER — DOXYCYCLINE 100 MG/1
100 CAPSULE ORAL EVERY 12 HOURS SCHEDULED
Qty: 14 CAPSULE | Refills: 0 | Status: SHIPPED | OUTPATIENT
Start: 2018-01-09 | End: 2018-01-16

## 2018-01-09 RX ORDER — ALBUTEROL SULFATE 90 UG/1
2 AEROSOL, METERED RESPIRATORY (INHALATION) EVERY 6 HOURS PRN
Qty: 1 INHALER | Refills: 0 | Status: SHIPPED | OUTPATIENT
Start: 2018-01-09 | End: 2018-02-12

## 2018-01-09 RX ORDER — MELATONIN
2000 DAILY
COMMUNITY

## 2018-01-09 RX ORDER — ALBUTEROL SULFATE 2.5 MG/3ML
2.5 SOLUTION RESPIRATORY (INHALATION) ONCE
Status: COMPLETED | OUTPATIENT
Start: 2018-01-09 | End: 2018-01-09

## 2018-01-09 RX ADMIN — ALBUTEROL SULFATE 2.5 MG: 2.5 SOLUTION RESPIRATORY (INHALATION) at 11:19

## 2018-01-09 NOTE — ED NOTES
"Per pt and family \"i just feel like im going to fait or pass out. Her blood pressure was real high before we got here- 160.\" pt c/o difficulty breathing and chest tightness since being in the car. She reports a recent cough and was started levaquin and dx with bronchitis 2-3 weeks ago.      Penelope Kate RN  01/09/18 0233    "

## 2018-01-09 NOTE — ED NOTES
Pt states she has had a productive cough with yellow, green, and and gray sputum since the end of November. Pt was diagnosed with bronchitis two weeks ago by pulmonoli     Maia Meraz RN  01/09/18 4131

## 2018-01-09 NOTE — ED PROVIDER NOTES
EMERGENCY DEPARTMENT ENCOUNTER    CHIEF COMPLAINT  Chief Complaint: Cough  History given by: pt/ spouse present at bedside   History limited by: N/A  Room Number: 05/05  PMD: Landry Hoffman MD      HPI:  Pt is a 72 y.o. female who presents complaining of a persisting productive cough with green-yellow sputum that has been ongoing for approximately 2 months. Pt states that laying down worsens the cough and sitting up helps to alleviate the cough. Pt has been seen 3 times [in the past month] at an [facility] for this cough and placed on antibiotics. Pt states she finished her round of Amoxicillin, but pt states she only took 1 dose of Levaquin. PT states she stopped taking the Levaquin after she became nauseous and ill feeling from the medication [pt cannot tolerate Levaquin or the steroids prescribed to her]. Pt was told by [facility] she had bronchitis. Pt also has a hx of bronchiectasis. Pt also c/o SOB associated with cough, but denies CP, speech difficulty, visual disturbance, tinnitus, dizziness, or any other pertinent symptoms. Pt states she cannot take any steroids because it makes the pt sick.     Duration: 2 months   Onset: gradual  Timing: persisting   Location: N/A  Radiation: N/A  Quality: productive cough   Intensity/Severity: moderate   Progression: unchanged   Associated Symptoms: SOB associated with cough  Aggravating Factors: Laying flat  Alleviating Factors: Sitting upright   Previous Episodes: PT was dx with bronchitis in December 2017 and has a hx of bronchiectasis.   Treatment before arrival: Pt finished her round of Amoxicillin, but was unable to tolerate steroids or Levaquin.      PAST MEDICAL HISTORY  Active Ambulatory Problems     Diagnosis Date Noted   • Anxiety 04/17/2016   • Bronchiectasis without complication 04/17/2016   • Hyperlipidemia 04/17/2016   • Hypertension 04/17/2016   • Osteopenia 04/17/2016   • Hypothyroidism 04/17/2016   • Health care maintenance 04/17/2016     Resolved Ambulatory  Problems     Diagnosis Date Noted   • No Resolved Ambulatory Problems     Past Medical History:   Diagnosis Date   • Maxillary sinusitis 10/08/2015   • Pneumonia        PAST SURGICAL HISTORY  Past Surgical History:   Procedure Laterality Date   • APPENDECTOMY     • TUBAL ABDOMINAL LIGATION         FAMILY HISTORY  Family History   Problem Relation Age of Onset   • Heart attack Mother      Acute Myocardial Infarction   • Heart failure Mother      Congestive Heart Failure   • Coronary artery disease Brother      CABG       SOCIAL HISTORY  Social History     Social History   • Marital status:      Spouse name: N/A   • Number of children: N/A   • Years of education: N/A     Occupational History   • Not on file.     Social History Main Topics   • Smoking status: Never Smoker   • Smokeless tobacco: Never Used   • Alcohol use No   • Drug use: No   • Sexual activity: Not on file     Other Topics Concern   • Not on file     Social History Narrative       ALLERGIES  Eggs or egg-derived products and Sulfa antibiotics    REVIEW OF SYSTEMS  Review of Systems   Constitutional: Negative.  Negative for chills and fever.   HENT: Negative.  Negative for sore throat.    Eyes: Negative.    Respiratory: Positive for cough and shortness of breath (associated with cough ).    Cardiovascular: Negative.  Negative for chest pain.   Gastrointestinal: Negative.    Genitourinary: Negative.  Negative for dysuria.   Musculoskeletal: Negative.  Negative for back pain.   Skin: Negative.  Negative for rash.   Neurological: Negative.  Negative for dizziness and headaches.       PHYSICAL EXAM  ED Triage Vitals   Temp Heart Rate Resp BP SpO2   01/09/18 0233 01/09/18 0233 01/09/18 0233 01/09/18 0244 01/09/18 0233   98.3 °F (36.8 °C) 87 16 177/87 99 %      Temp src Heart Rate Source Patient Position BP Location FiO2 (%)   01/09/18 0233 01/09/18 0233 01/09/18 0440 01/09/18 1013 --   Tympanic Monitor Lying Right arm        Physical Exam    Constitutional: She is oriented to person, place, and time and well-developed, well-nourished, and in no distress. No distress.   HENT:   Head: Normocephalic and atraumatic.   Eyes: EOM are normal. Pupils are equal, round, and reactive to light.   Neck: Normal range of motion. Neck supple.   Cardiovascular: Normal rate, regular rhythm, normal heart sounds and intact distal pulses.    Pulmonary/Chest: Effort normal. No respiratory distress. She has wheezes (very mild expiratory wheeze).   Mild rhonchi in the bases with a mild cough with deep breathing.   Abdominal: Soft. There is no tenderness. There is no rebound and no guarding.   Musculoskeletal: Normal range of motion. She exhibits no edema.   Neurological: She is alert and oriented to person, place, and time. She has normal sensation and normal strength.   Skin: Skin is warm and dry. No rash noted.   Psychiatric: Affect normal. Her mood appears anxious.   Nursing note and vitals reviewed.      LAB RESULTS  Lab Results (last 24 hours)     Procedure Component Value Units Date/Time    Influenza Antigen, Rapid - Swab, Nasopharynx [[id number]]  (Normal) Collected:  01/09/18 0307    Specimen:  Swab from Nasopharynx Updated:  01/09/18 0340     Influenza A Ag, EIA Negative     Influenza B Ag, EIA Negative    CBC & Differential [[id number]] Collected:  01/09/18 1007    Specimen:  Blood Updated:  01/09/18 1042    Narrative:       The following orders were created for panel order CBC & Differential.  Procedure                               Abnormality         Status                     ---------                               -----------         ------                     CBC Auto Differential[[id number]]        Abnormal            Final result                 Please view results for these tests on the individual orders.    Troponin [[id number]]  (Normal) Collected:  01/09/18 1007    Specimen:  Blood Updated:  01/09/18 1107     Troponin T <0.010 ng/mL     Narrative:        Troponin T Reference Ranges:  Less than 0.03 ng/mL:    Negative for AMI  0.03 to 0.09 ng/mL:      Indeterminant for AMI  Greater than 0.09 ng/mL: Positive for AMI    BNP [[id number]]  (Abnormal) Collected:  01/09/18 1007    Specimen:  Blood Updated:  01/09/18 1104     proBNP 926.6 (H) pg/mL     Narrative:       Among patients with dyspnea, NT-proBNP is highly sensitive for the detection of acute congestive heart failure. In addition NT-proBNP of <300 pg/ml effectively rules out acute congestive heart failure with 99% negative predictive value.    CBC Auto Differential [[id number]]  (Abnormal) Collected:  01/09/18 1007    Specimen:  Blood Updated:  01/09/18 1042     WBC 8.04 10*3/mm3      RBC 4.21 10*6/mm3      Hemoglobin 12.7 g/dL      Hematocrit 36.3 %      MCV 86.2 fL      MCH 30.2 pg      MCHC 35.0 g/dL      RDW 13.2 (H) %      RDW-SD 42.0 fl      MPV 10.9 fL      Platelets 165 10*3/mm3      Neutrophil % 84.0 (H) %      Lymphocyte % 9.2 (L) %      Monocyte % 6.0 %      Eosinophil % 0.2 (L) %      Basophil % 0.2 %      Immature Grans % 0.4 %      Neutrophils, Absolute 6.75 10*3/mm3      Lymphocytes, Absolute 0.74 (L) 10*3/mm3      Monocytes, Absolute 0.48 10*3/mm3      Eosinophils, Absolute 0.02 10*3/mm3      Basophils, Absolute 0.02 10*3/mm3      Immature Grans, Absolute 0.03 10*3/mm3     Comprehensive Metabolic Panel [[id number]]  (Abnormal) Collected:  01/09/18 1007    Specimen:  Blood Updated:  01/09/18 1107     Glucose 98 mg/dL      BUN 13 mg/dL      Creatinine 0.90 mg/dL      Sodium 134 (L) mmol/L      Potassium 3.6 mmol/L      Chloride 94 (L) mmol/L      CO2 26.2 mmol/L      Calcium 9.5 mg/dL      Total Protein 7.4 g/dL      Albumin 4.10 g/dL      ALT (SGPT) 7 U/L      AST (SGOT) 19 U/L      Alkaline Phosphatase 85 U/L      Total Bilirubin 1.1 mg/dL      eGFR Non African Amer 61 mL/min/1.73      Globulin 3.3 gm/dL      A/G Ratio 1.2 g/dL      BUN/Creatinine Ratio 14.4     Anion Gap 13.8 mmol/L     Narrative:        The MDRD GFR formula is only valid for adults with stable renal function between ages 18 and 70.    Urinalysis With / Culture If Indicated - Urine, Clean Catch [[id number]]  (Abnormal) Collected:  01/09/18 1050    Specimen:  Urine from Urine, Clean Catch Updated:  01/09/18 1120     Color, UA Yellow     Appearance, UA Clear     pH, UA 8.0     Specific Gravity, UA 1.012     Glucose, UA Negative     Ketones, UA 15 mg/dL (1+) (A)     Bilirubin, UA Negative     Blood, UA Negative     Protein, UA Negative     Leuk Esterase, UA Negative     Nitrite, UA Negative     Urobilinogen, UA 1.0 E.U./dL    Narrative:       Urine microscopic not indicated.          I ordered the above labs and reviewed the results    RADIOLOGY  XR Chest 2 View   Preliminary Result   No acute findings.                   I ordered the above noted radiological studies. Interpreted by radiologist. Reviewed by me in PACS.       PROCEDURES  Procedures  EKG           EKG time: 1022  Rhythm/Rate: rate 70, sinus rhythm  P waves and TX: normal  QRS, axis: narrow QRS, normal axis   ST and T waves: Nonspecific changes  Normal QT, nothing acute     Interpreted Contemporaneously by me, independently viewed  Unchanged compared to prior 0230 on  And 8/20/17      PROGRESS AND CONSULTS  ED Course     0305  Triage ordered initial labs and Chest XR for further evaluation.     1030  Observed pt ambulating in the ER with no difficulty.     1035  Evaluated pt and discussed that pt is not allergic to her antibiotics, but is experiencing side effects of the medication. Discussed plan to administer a breathing treatment and observe pt.     1041  Ordered Proventil for cough.     1046  The heart monitor in this room is not functioning properly. I have informed staff of this finding. Whatever recordings found on space lab will be inaccurate. This pt does not have CP or palpitations. I have very low concern that this episode is cardiac related due to pt's current symptoms.      1148  Rechecked pt who is resting comfortably and in no acute distress. Discussed the labs/ radiology/ heart enzymes/ and EKG were all normal.. She feels albuterol treatment helped some. The patient again refuses any steroids.  Plan to prescribe pt a new antibiotic and albuterol and discussed discharge plans. Pt understands and agrees to plan, all questions addressed at this time. On re-evaluation, pt has a normal HR that is in the 70's, O2 Sats are 99% on room air, and BP is normal. Vital signs stable.     1153  Placed consult to Pulmonology t get follow up as Dr Martinez has retired.      1239  Discussed pt's case with Dr. Esteban Reeves [pulmonology] who recommends pt be discharged and call the office tomorrow to schedule an appointment.    1320  Rechecked pt after her breathing treatment was administered. Pt states she feels improved. Plan to discharge.  Re-evaluation, pt has stable vital signs.     MEDICAL DECISION MAKING  Results were reviewed/discussed with the patient and they were also made aware of online access. Pt also made aware that some labs, such as cultures, will not be resulted during ER visit and follow up with PMD is necessary.     MDM  Number of Diagnoses or Management Options     Amount and/or Complexity of Data Reviewed  Clinical lab tests: ordered and reviewed (Unremarkable )  Tests in the radiology section of CPT®: reviewed and ordered (Chest XR: No acute findings )  Tests in the medicine section of CPT®: reviewed and ordered (Refer to procedure )  Discussion of test results with the performing providers: yes (Dr. Esteban Reeves (pulmonology) )  Review and summarize past medical records: yes (12/1/17, 12/10/17, 12/21/17 Pt was seen at [facility] for bronchitis and was placed on antibiotics. )  Independent visualization of images, tracings, or specimens: yes           DIAGNOSIS  Final diagnoses:   Bronchiectasis with acute exacerbation       DISPOSITION  DISCHARGE    Patient discharged in  stable condition.    Reviewed implications of results, diagnosis, meds, responsibility to follow up, warning signs and symptoms of possible worsening, potential complications and reasons to return to ER.    Patient/Family voiced understanding of above instructions.    Discussed plan for discharge, as there is no emergent indication for admission.  Pt/family is agreeable and understands need for follow up and repeat testing.  Pt is aware that discharge does not mean that nothing is wrong but it indicates no emergency is present that requires admission and they must continue care with follow-up as given below or physician of their choice.     FOLLOW-UP  Ti Martinez MD  [address] [address]  [phone]      Return if symptoms worsen, shortness of breath, fever, any concerns         Medication List      New Prescriptions          albuterol 108 (90 Base) MCG/ACT inhaler   Commonly known as:  PROVENTIL HFA;VENTOLIN HFA   Inhale 2 puffs Every 6 (Six) Hours As Needed for Wheezing.       doxycycline 100 MG capsule   Commonly known as:  MONODOX   Take 1 capsule by mouth Every 12 (Twelve) Hours for 7 days.         Changed          levoFLOXacin 500 MG tablet   Commonly known as:  LEVAQUIN   Take 1 tablet by mouth Daily.   What changed:  Another medication with the same name was removed. Continue   taking this medication, and follow the directions you see here.       potassium chloride 10 MEQ CR tablet   Commonly known as:  K-DUR   TAKE 1 TABLET BY MOUTH TWICE DAILY   What changed:  Another medication with the same name was removed. Continue   taking this medication, and follow the directions you see here.         Stop          amoxicillin 500 MG tablet   Commonly known as:  AMOXIL       MethylPREDNISolone 4 MG tablet   Commonly known as:  MEDROL (ALEKSANDR)               Latest Documented Vital Signs:  As of 6:35 PM  BP- 130/75 HR- 82 Temp- 98.4 °F (36.9 °C) (Oral) O2 sat- 98%    --  Documentation  assistance provided by markos Hinojosa for Dr. Zambrano.  Information recorded by the scribe was done at my direction and has been verified and validated by me.               Brad Hinojosa  01/09/18 1326       Valentino Zambrano MD  01/09/18 3950

## 2018-01-09 NOTE — DISCHARGE INSTRUCTIONS
Call Dr Martinez's office and inform that you were in Emergency Department and that you need a new Lung Doctor and they will see you soon. Follow up in next 1-2 weeks.

## 2018-01-11 ENCOUNTER — EPISODE CHANGES (OUTPATIENT)
Dept: CASE MANAGEMENT | Facility: OTHER | Age: 73
End: 2018-01-11

## 2018-01-15 ENCOUNTER — TELEPHONE (OUTPATIENT)
Dept: SOCIAL WORK | Facility: HOSPITAL | Age: 73
End: 2018-01-15

## 2018-01-16 RX ORDER — ACYCLOVIR 200 MG/1
CAPSULE ORAL
Qty: 25 CAPSULE | Refills: 0 | Status: SHIPPED | OUTPATIENT
Start: 2018-01-16 | End: 2018-02-12

## 2018-01-22 RX ORDER — BUSPIRONE HYDROCHLORIDE 5 MG/1
TABLET ORAL
Qty: 60 TABLET | Refills: 0 | Status: SHIPPED | OUTPATIENT
Start: 2018-01-22 | End: 2018-02-12 | Stop reason: DRUGHIGH

## 2018-01-25 ENCOUNTER — TRANSCRIBE ORDERS (OUTPATIENT)
Dept: ADMINISTRATIVE | Facility: HOSPITAL | Age: 73
End: 2018-01-25

## 2018-01-25 DIAGNOSIS — J47.9 BRONCHIECTASIS WITHOUT COMPLICATION (HCC): Primary | ICD-10-CM

## 2018-02-12 ENCOUNTER — OFFICE VISIT (OUTPATIENT)
Dept: INTERNAL MEDICINE | Facility: CLINIC | Age: 73
End: 2018-02-12

## 2018-02-12 VITALS
RESPIRATION RATE: 16 BRPM | WEIGHT: 131 LBS | HEIGHT: 65 IN | SYSTOLIC BLOOD PRESSURE: 140 MMHG | OXYGEN SATURATION: 97 % | DIASTOLIC BLOOD PRESSURE: 84 MMHG | TEMPERATURE: 98.8 F | BODY MASS INDEX: 21.83 KG/M2 | HEART RATE: 60 BPM

## 2018-02-12 DIAGNOSIS — M85.80 OSTEOPENIA, UNSPECIFIED LOCATION: ICD-10-CM

## 2018-02-12 DIAGNOSIS — E03.9 ACQUIRED HYPOTHYROIDISM: ICD-10-CM

## 2018-02-12 DIAGNOSIS — E78.5 HYPERLIPIDEMIA, UNSPECIFIED HYPERLIPIDEMIA TYPE: ICD-10-CM

## 2018-02-12 DIAGNOSIS — I10 ESSENTIAL HYPERTENSION: Primary | ICD-10-CM

## 2018-02-12 DIAGNOSIS — J47.9 BRONCHIECTASIS WITHOUT COMPLICATION (HCC): ICD-10-CM

## 2018-02-12 DIAGNOSIS — F41.9 ANXIETY: ICD-10-CM

## 2018-02-12 PROCEDURE — 99213 OFFICE O/P EST LOW 20 MIN: CPT | Performed by: INTERNAL MEDICINE

## 2018-02-12 RX ORDER — BUSPIRONE HYDROCHLORIDE 10 MG/1
TABLET ORAL
Qty: 180 TABLET | Refills: 1 | Status: SHIPPED | OUTPATIENT
Start: 2018-02-12 | End: 2018-11-18 | Stop reason: SDUPTHER

## 2018-02-12 NOTE — PROGRESS NOTES
Mary Mcgrath is a 72 y.o. female.   1/22/2018  Wt Readings from Last 1 Encounters:   01/09/18 59 kg (130 lb)   She was last seen in November 2017, weight 1:30 then, 131 now.    She returns for follow-up of hypertension, hyperlipidemia, hypothyroidism, osteopenia, cough, and allergy symptoms, anxiety    History of Present Illness   She has been troubled with anxiety.  She was prescribed BuSpar 5 mg twice a day and she is satisfied with the medication with exit she needs a stronger dose.    She had pulmonary follow-up January 23.  She is known to have bronchiectasis in the right lung and also was has chronic rhinitis and rhinosinusitis.  She was prescribed I messed up to use for that.  An alternative would be to use Atrovent nasal spray and Flonase nasal spray in combination together.    She has hypertension treated with losartan 50 mg daily, Inspra lactone 25 mg daily, and metoprolol succinate 50 mg daily.  New progression has hypothyroidism treated with levothyroxin 50 µg daily.    She has hyperlipidemia treated with atorvastatin 10 mg daily.    She is osteopenia and takes Caltrate D twice a day and supplemental vitamin D 1000 international units daily.  No falls are reported.    She has had problems getting a fever blisters on her lips and nose.  Acyclovir has been effective.    Chart review indicates that she had laboratory studies done at the emergency department January 9 now.  CBC and CMP were unremarkable as was urine except for some ketones  The following portions of the patient's history were reviewed and updated as appropriate: allergies, current medications, past family history, past medical history, past social history, past surgical history and problem list.    Review of Systems   Constitutional: Negative.    HENT: Negative.    Eyes: Negative.    Respiratory: Negative.    Cardiovascular: Negative.    Endocrine: Negative.    Genitourinary: Negative.    Skin: Negative.    Neurological:  Negative.    Hematological: Negative.    Psychiatric/Behavioral: Negative.        Objective   Physical Exam   Constitutional: She appears well-developed and well-nourished.   HENT:   Head: Normocephalic and atraumatic.   Cardiovascular: Normal rate and regular rhythm.  Exam reveals no gallop and no friction rub.    No murmur heard.  Pulmonary/Chest: Effort normal and breath sounds normal. No respiratory distress. She has no wheezes. She has no rales.   Abdominal: Soft. Bowel sounds are normal. She exhibits no distension and no mass. There is no tenderness.   Neurological: She is alert.   Skin: Skin is warm.   Psychiatric: Her behavior is normal.   Vitals reviewed.      Assessment/Plan   Karly was seen today for hypertension, hyperlipidemia, cough and hypothyroidism.    Diagnoses and all orders for this visit:    Essential hypertension  Comments:  Continue losartan 50 mg daily, metoprolol succinate 50 mg daily, spironolactone 25 mg daily    Hyperlipidemia, unspecified hyperlipidemia type  Comments:  Continue atorvastatin 10 mg daily    Bronchiectasis without complication  Comments:  Followed by     Acquired hypothyroidism  Comments:  Continue levothyroxin 50 µg daily    Osteopenia, unspecified location  Comments:  Continue Caltrate D twice a day, supplemental vitamin D with thousand international units daily, and regular weightbearing exercise such as treadmill    Anxiety  Comments:  We will increase buspirone to 10 mg twice a day    Other orders  -     busPIRone (BUSPAR) 10 MG tablet; 1 by mouth twice a day for anxiety     fever blisters, she is given a prescription for acyclovir 200 mg 5 times a day for 5 days to start as needed      Keep May follow-up as scheduled, sooner if needed                     EMR Dragon/Transcription disclaimer:      Much of this encounter note is an electronic transcription/translation of spoken language to printed text. The electronic translation of spoken language may  permit erroneous, or at times, nonsensical words or phrases to be inadvertently transcribed; Although I have reviewed the note for such errors, some may still exist.

## 2018-02-15 RX ORDER — POTASSIUM CHLORIDE 750 MG/1
TABLET, FILM COATED, EXTENDED RELEASE ORAL
Qty: 180 TABLET | Refills: 0 | Status: SHIPPED | OUTPATIENT
Start: 2018-02-15 | End: 2018-05-17 | Stop reason: SDUPTHER

## 2018-02-23 PROBLEM — B00.9 HERPES SIMPLEX TYPE 1 INFECTION: Status: ACTIVE | Noted: 2018-02-23

## 2018-02-26 ENCOUNTER — APPOINTMENT (OUTPATIENT)
Dept: CT IMAGING | Facility: HOSPITAL | Age: 73
End: 2018-02-26
Attending: INTERNAL MEDICINE

## 2018-02-28 ENCOUNTER — HOSPITAL ENCOUNTER (OUTPATIENT)
Dept: CT IMAGING | Facility: HOSPITAL | Age: 73
Discharge: HOME | End: 2018-02-28
Attending: INTERNAL MEDICINE | Admitting: INTERNAL MEDICINE

## 2018-02-28 DIAGNOSIS — J47.9 BRONCHIECTASIS WITHOUT COMPLICATION (HCC): ICD-10-CM

## 2018-02-28 PROCEDURE — 71250 CT THORAX DX C-: CPT

## 2018-03-23 RX ORDER — LOSARTAN POTASSIUM 50 MG/1
TABLET ORAL
Qty: 90 TABLET | Refills: 0 | Status: SHIPPED | OUTPATIENT
Start: 2018-03-23 | End: 2018-09-13 | Stop reason: SDUPTHER

## 2018-03-23 RX ORDER — SPIRONOLACTONE 25 MG/1
TABLET ORAL
Qty: 90 TABLET | Refills: 0 | Status: SHIPPED | OUTPATIENT
Start: 2018-03-23 | End: 2018-06-18 | Stop reason: SDUPTHER

## 2018-04-05 RX ORDER — ATORVASTATIN CALCIUM 10 MG/1
TABLET, FILM COATED ORAL
Qty: 90 TABLET | Refills: 0 | Status: SHIPPED | OUTPATIENT
Start: 2018-04-05 | End: 2018-07-04 | Stop reason: SDUPTHER

## 2018-05-11 RX ORDER — LEVOTHYROXINE SODIUM 0.05 MG/1
50 TABLET ORAL DAILY
Qty: 90 TABLET | Refills: 0 | Status: SHIPPED | OUTPATIENT
Start: 2018-05-11 | End: 2018-08-07 | Stop reason: SDUPTHER

## 2018-05-11 RX ORDER — METOPROLOL SUCCINATE 50 MG/1
50 TABLET, EXTENDED RELEASE ORAL DAILY
Qty: 90 TABLET | Refills: 0 | Status: SHIPPED | OUTPATIENT
Start: 2018-05-11 | End: 2018-08-07 | Stop reason: SDUPTHER

## 2018-05-16 ENCOUNTER — OFFICE VISIT (OUTPATIENT)
Dept: INTERNAL MEDICINE | Facility: CLINIC | Age: 73
End: 2018-05-16

## 2018-05-16 VITALS
SYSTOLIC BLOOD PRESSURE: 130 MMHG | DIASTOLIC BLOOD PRESSURE: 72 MMHG | OXYGEN SATURATION: 98 % | TEMPERATURE: 97.8 F | RESPIRATION RATE: 16 BRPM | HEART RATE: 59 BPM | BODY MASS INDEX: 22.02 KG/M2 | WEIGHT: 132.2 LBS | HEIGHT: 65 IN

## 2018-05-16 DIAGNOSIS — J47.9 BRONCHIECTASIS WITHOUT COMPLICATION (HCC): ICD-10-CM

## 2018-05-16 DIAGNOSIS — E03.9 ACQUIRED HYPOTHYROIDISM: ICD-10-CM

## 2018-05-16 DIAGNOSIS — I10 ESSENTIAL HYPERTENSION: Primary | ICD-10-CM

## 2018-05-16 DIAGNOSIS — F41.9 ANXIETY: ICD-10-CM

## 2018-05-16 DIAGNOSIS — Z00.00 HEALTH CARE MAINTENANCE: ICD-10-CM

## 2018-05-16 DIAGNOSIS — E78.5 HYPERLIPIDEMIA, UNSPECIFIED HYPERLIPIDEMIA TYPE: ICD-10-CM

## 2018-05-16 LAB
ALBUMIN SERPL-MCNC: 4.4 G/DL (ref 3.5–5.2)
ALBUMIN/GLOB SERPL: 1.7 G/DL
ALP SERPL-CCNC: 87 U/L (ref 39–117)
ALT SERPL-CCNC: 11 U/L (ref 1–33)
APPEARANCE UR: CLEAR
AST SERPL-CCNC: 21 U/L (ref 1–32)
BACTERIA #/AREA URNS HPF: NORMAL /HPF
BILIRUB SERPL-MCNC: 0.8 MG/DL (ref 0.1–1.2)
BILIRUB UR QL STRIP: NEGATIVE
BUN SERPL-MCNC: 17 MG/DL (ref 8–23)
BUN/CREAT SERPL: 16.2 (ref 7–25)
CALCIUM SERPL-MCNC: 9.8 MG/DL (ref 8.6–10.5)
CASTS URNS MICRO: NORMAL
CHLORIDE SERPL-SCNC: 94 MMOL/L (ref 98–107)
CHOLEST SERPL-MCNC: 150 MG/DL (ref 0–200)
CO2 SERPL-SCNC: 26 MMOL/L (ref 22–29)
COLOR UR: YELLOW
CREAT SERPL-MCNC: 1.05 MG/DL (ref 0.57–1)
EPI CELLS #/AREA URNS HPF: NORMAL /HPF
GFR SERPLBLD CREATININE-BSD FMLA CKD-EPI: 51 ML/MIN/1.73
GFR SERPLBLD CREATININE-BSD FMLA CKD-EPI: 62 ML/MIN/1.73
GLOBULIN SER CALC-MCNC: 2.6 GM/DL
GLUCOSE SERPL-MCNC: 89 MG/DL (ref 65–99)
GLUCOSE UR QL: NEGATIVE
HDLC SERPL-MCNC: 79 MG/DL (ref 40–60)
HGB UR QL STRIP: NEGATIVE
KETONES UR QL STRIP: NEGATIVE
LDLC SERPL CALC-MCNC: 62 MG/DL (ref 0–100)
LEUKOCYTE ESTERASE UR QL STRIP: (no result)
NITRITE UR QL STRIP: NEGATIVE
PH UR STRIP: 8 [PH] (ref 5–8)
POTASSIUM SERPL-SCNC: 4.4 MMOL/L (ref 3.5–5.2)
PROT SERPL-MCNC: 7 G/DL (ref 6–8.5)
PROT UR QL STRIP: NEGATIVE
RBC #/AREA URNS HPF: NORMAL /HPF
SODIUM SERPL-SCNC: 132 MMOL/L (ref 136–145)
SP GR UR: 1.01 (ref 1–1.03)
T4 FREE SERPL-MCNC: 1.33 NG/DL (ref 0.93–1.7)
TRIGL SERPL-MCNC: 45 MG/DL (ref 0–150)
TSH SERPL DL<=0.005 MIU/L-ACNC: 3.12 MIU/ML (ref 0.27–4.2)
UROBILINOGEN UR STRIP-MCNC: (no result) MG/DL
VLDLC SERPL CALC-MCNC: 9 MG/DL (ref 5–40)
WBC #/AREA URNS HPF: NORMAL /HPF

## 2018-05-16 PROCEDURE — 99213 OFFICE O/P EST LOW 20 MIN: CPT | Performed by: INTERNAL MEDICINE

## 2018-05-17 RX ORDER — POTASSIUM CHLORIDE 750 MG/1
TABLET, FILM COATED, EXTENDED RELEASE ORAL
Qty: 180 TABLET | Refills: 0 | Status: SHIPPED | OUTPATIENT
Start: 2018-05-17 | End: 2018-12-06 | Stop reason: SDUPTHER

## 2018-05-22 DIAGNOSIS — N28.9 ABNORMAL KIDNEY FUNCTION: Primary | ICD-10-CM

## 2018-05-27 ENCOUNTER — RESULTS ENCOUNTER (OUTPATIENT)
Dept: INTERNAL MEDICINE | Facility: CLINIC | Age: 73
End: 2018-05-27

## 2018-05-27 DIAGNOSIS — N28.9 ABNORMAL KIDNEY FUNCTION: ICD-10-CM

## 2018-06-07 ENCOUNTER — TRANSCRIBE ORDERS (OUTPATIENT)
Dept: ADMINISTRATIVE | Facility: HOSPITAL | Age: 73
End: 2018-06-07

## 2018-06-07 DIAGNOSIS — J47.9 BRONCHIECTASIS WITHOUT COMPLICATION (HCC): Primary | ICD-10-CM

## 2018-06-18 LAB
BUN SERPL-MCNC: 17 MG/DL (ref 8–23)
BUN/CREAT SERPL: 15.6 (ref 7–25)
CALCIUM SERPL-MCNC: 9.7 MG/DL (ref 8.6–10.5)
CHLORIDE SERPL-SCNC: 88 MMOL/L (ref 98–107)
CO2 SERPL-SCNC: 25.5 MMOL/L (ref 22–29)
CREAT SERPL-MCNC: 1.09 MG/DL (ref 0.57–1)
GFR SERPLBLD CREATININE-BSD FMLA CKD-EPI: 49 ML/MIN/1.73
GFR SERPLBLD CREATININE-BSD FMLA CKD-EPI: 60 ML/MIN/1.73
GLUCOSE SERPL-MCNC: 101 MG/DL (ref 65–99)
POTASSIUM SERPL-SCNC: 4.7 MMOL/L (ref 3.5–5.2)
SODIUM SERPL-SCNC: 127 MMOL/L (ref 136–145)

## 2018-06-18 RX ORDER — LOSARTAN POTASSIUM 50 MG/1
TABLET ORAL
Qty: 90 TABLET | Refills: 0 | Status: SHIPPED | OUTPATIENT
Start: 2018-06-18 | End: 2018-09-13 | Stop reason: SDUPTHER

## 2018-06-18 RX ORDER — SPIRONOLACTONE 25 MG/1
TABLET ORAL
Qty: 90 TABLET | Refills: 0 | Status: SHIPPED | OUTPATIENT
Start: 2018-06-18 | End: 2018-09-13 | Stop reason: SDUPTHER

## 2018-06-25 DIAGNOSIS — N28.9 ABNORMAL KIDNEY FUNCTION: Primary | ICD-10-CM

## 2018-06-30 ENCOUNTER — RESULTS ENCOUNTER (OUTPATIENT)
Dept: INTERNAL MEDICINE | Facility: CLINIC | Age: 73
End: 2018-06-30

## 2018-06-30 DIAGNOSIS — N28.9 ABNORMAL KIDNEY FUNCTION: ICD-10-CM

## 2018-07-02 ENCOUNTER — TELEPHONE (OUTPATIENT)
Dept: INTERNAL MEDICINE | Facility: CLINIC | Age: 73
End: 2018-07-02

## 2018-07-02 NOTE — TELEPHONE ENCOUNTER
Pt called and said she found a dark colored spot in her mouth and is unsure what it is. Pt said she tried to call the dentist and get an appointment but cant get one for a couple weeks. Pt would like to know if she should come in for an appointment. Please advise.

## 2018-07-05 DIAGNOSIS — R39.9 UTI SYMPTOMS: Primary | ICD-10-CM

## 2018-07-05 RX ORDER — ATORVASTATIN CALCIUM 10 MG/1
TABLET, FILM COATED ORAL
Qty: 90 TABLET | Refills: 0 | Status: SHIPPED | OUTPATIENT
Start: 2018-07-05 | End: 2018-09-30 | Stop reason: SDUPTHER

## 2018-07-06 ENCOUNTER — APPOINTMENT (OUTPATIENT)
Dept: CT IMAGING | Facility: HOSPITAL | Age: 73
End: 2018-07-06

## 2018-07-06 ENCOUNTER — HOSPITAL ENCOUNTER (EMERGENCY)
Facility: HOSPITAL | Age: 73
Discharge: HOME | End: 2018-07-06
Attending: EMERGENCY MEDICINE | Admitting: EMERGENCY MEDICINE

## 2018-07-06 VITALS
RESPIRATION RATE: 16 BRPM | SYSTOLIC BLOOD PRESSURE: 138 MMHG | BODY MASS INDEX: 21.66 KG/M2 | HEART RATE: 73 BPM | WEIGHT: 130 LBS | OXYGEN SATURATION: 97 % | TEMPERATURE: 97.9 F | HEIGHT: 65 IN | DIASTOLIC BLOOD PRESSURE: 63 MMHG

## 2018-07-06 DIAGNOSIS — R30.0 DYSURIA: Primary | ICD-10-CM

## 2018-07-06 DIAGNOSIS — R10.30 LOWER ABDOMINAL PAIN: ICD-10-CM

## 2018-07-06 DIAGNOSIS — N28.1 RENAL CYST, RIGHT: ICD-10-CM

## 2018-07-06 LAB
ALBUMIN SERPL-MCNC: 4.1 G/DL (ref 3.5–5.2)
ALBUMIN/GLOB SERPL: 1.8 G/DL
ALP SERPL-CCNC: 67 U/L (ref 39–117)
ALT SERPL W P-5'-P-CCNC: 10 U/L (ref 1–33)
ANION GAP SERPL CALCULATED.3IONS-SCNC: 11.7 MMOL/L
APPEARANCE UR: CLEAR
AST SERPL-CCNC: 17 U/L (ref 1–32)
BACTERIA #/AREA URNS HPF: NORMAL /HPF
BACTERIA UR QL AUTO: NORMAL /HPF
BASOPHILS # BLD AUTO: 0.01 10*3/MM3 (ref 0–0.2)
BASOPHILS NFR BLD AUTO: 0.2 % (ref 0–1.5)
BILIRUB SERPL-MCNC: 0.5 MG/DL (ref 0.1–1.2)
BILIRUB UR QL STRIP: NEGATIVE
BILIRUB UR QL STRIP: NEGATIVE
BUN BLD-MCNC: 13 MG/DL (ref 8–23)
BUN/CREAT SERPL: 15.9 (ref 7–25)
CALCIUM SPEC-SCNC: 7.8 MG/DL (ref 8.6–10.5)
CHLORIDE SERPL-SCNC: 91 MMOL/L (ref 98–107)
CLARITY UR: CLEAR
CO2 SERPL-SCNC: 23.3 MMOL/L (ref 22–29)
COLOR UR: ABNORMAL
COLOR UR: YELLOW
CREAT BLD-MCNC: 0.82 MG/DL (ref 0.57–1)
DEPRECATED RDW RBC AUTO: 42.2 FL (ref 37–54)
EOSINOPHIL # BLD AUTO: 0.09 10*3/MM3 (ref 0–0.7)
EOSINOPHIL NFR BLD AUTO: 1.9 % (ref 0.3–6.2)
EPI CELLS #/AREA URNS HPF: NORMAL /HPF
ERYTHROCYTE [DISTWIDTH] IN BLOOD BY AUTOMATED COUNT: 13.6 % (ref 11.7–13)
GFR SERPL CREATININE-BSD FRML MDRD: 68 ML/MIN/1.73
GLOBULIN UR ELPH-MCNC: 2.3 GM/DL
GLUCOSE BLD-MCNC: 96 MG/DL (ref 65–99)
GLUCOSE UR QL: NEGATIVE
GLUCOSE UR STRIP-MCNC: NEGATIVE MG/DL
HCT VFR BLD AUTO: 33.5 % (ref 35.6–45.5)
HGB BLD-MCNC: 11.6 G/DL (ref 11.9–15.5)
HGB UR QL STRIP.AUTO: NEGATIVE
HGB UR QL STRIP: NEGATIVE
HYALINE CASTS UR QL AUTO: NORMAL /LPF
IMM GRANULOCYTES # BLD: 0.01 10*3/MM3 (ref 0–0.03)
IMM GRANULOCYTES NFR BLD: 0.2 % (ref 0–0.5)
KETONES UR QL STRIP: NEGATIVE
KETONES UR QL STRIP: NEGATIVE
LEUKOCYTE ESTERASE UR QL STRIP.AUTO: ABNORMAL
LEUKOCYTE ESTERASE UR QL STRIP: NEGATIVE
LYMPHOCYTES # BLD AUTO: 1.61 10*3/MM3 (ref 0.9–4.8)
LYMPHOCYTES NFR BLD AUTO: 33.7 % (ref 19.6–45.3)
MCH RBC QN AUTO: 29.4 PG (ref 26.9–32)
MCHC RBC AUTO-ENTMCNC: 34.6 G/DL (ref 32.4–36.3)
MCV RBC AUTO: 85 FL (ref 80.5–98.2)
MICRO URNS: NORMAL
MICRO URNS: NORMAL
MONOCYTES # BLD AUTO: 0.49 10*3/MM3 (ref 0.2–1.2)
MONOCYTES NFR BLD AUTO: 10.3 % (ref 5–12)
MUCOUS THREADS URNS QL MICRO: PRESENT /HPF
NEUTROPHILS # BLD AUTO: 2.58 10*3/MM3 (ref 1.9–8.1)
NEUTROPHILS NFR BLD AUTO: 53.9 % (ref 42.7–76)
NITRITE UR QL STRIP: NEGATIVE
NITRITE UR QL STRIP: POSITIVE
PH UR STRIP.AUTO: 7 [PH] (ref 5–8)
PH UR STRIP: 5 [PH] (ref 5–7.5)
PLATELET # BLD AUTO: 151 10*3/MM3 (ref 140–500)
PMV BLD AUTO: 11.6 FL (ref 6–12)
POTASSIUM BLD-SCNC: 5 MMOL/L (ref 3.5–5.2)
PROT SERPL-MCNC: 6.4 G/DL (ref 6–8.5)
PROT UR QL STRIP: NEGATIVE
PROT UR QL STRIP: NEGATIVE
RBC # BLD AUTO: 3.94 10*6/MM3 (ref 3.9–5.2)
RBC # UR: NORMAL /HPF
RBC #/AREA URNS HPF: NORMAL /HPF
REF LAB TEST METHOD: NORMAL
SODIUM BLD-SCNC: 126 MMOL/L (ref 136–145)
SP GR UR STRIP: 1.01 (ref 1–1.03)
SP GR UR: 1.01 (ref 1–1.03)
SQUAMOUS #/AREA URNS HPF: NORMAL /HPF
URINALYSIS REFLEX: NORMAL
UROBILINOGEN UR QL STRIP: ABNORMAL
UROBILINOGEN UR STRIP-MCNC: 0.2 MG/DL (ref 0.2–1)
WBC #/AREA URNS HPF: NORMAL /HPF
WBC NRBC COR # BLD: 4.78 10*3/MM3 (ref 4.5–10.7)
WBC UR QL AUTO: NORMAL /HPF

## 2018-07-06 PROCEDURE — 74176 CT ABD & PELVIS W/O CONTRAST: CPT

## 2018-07-06 PROCEDURE — 96360 HYDRATION IV INFUSION INIT: CPT

## 2018-07-06 PROCEDURE — 81001 URINALYSIS AUTO W/SCOPE: CPT | Performed by: PHYSICIAN ASSISTANT

## 2018-07-06 PROCEDURE — 96361 HYDRATE IV INFUSION ADD-ON: CPT

## 2018-07-06 PROCEDURE — 85025 COMPLETE CBC W/AUTO DIFF WBC: CPT | Performed by: PHYSICIAN ASSISTANT

## 2018-07-06 PROCEDURE — 99284 EMERGENCY DEPT VISIT MOD MDM: CPT

## 2018-07-06 PROCEDURE — 80053 COMPREHEN METABOLIC PANEL: CPT | Performed by: PHYSICIAN ASSISTANT

## 2018-07-06 RX ORDER — SODIUM CHLORIDE 0.9 % (FLUSH) 0.9 %
10 SYRINGE (ML) INJECTION AS NEEDED
Status: DISCONTINUED | OUTPATIENT
Start: 2018-07-06 | End: 2018-07-06 | Stop reason: HOSPADM

## 2018-07-06 RX ORDER — POLYETHYLENE GLYCOL 3350 17 G/17G
17 POWDER, FOR SOLUTION ORAL DAILY
Qty: 10 EACH | Refills: 0 | Status: SHIPPED | OUTPATIENT
Start: 2018-07-06 | End: 2019-02-19

## 2018-07-06 RX ORDER — DICYCLOMINE HYDROCHLORIDE 10 MG/1
10 CAPSULE ORAL 4 TIMES DAILY PRN
Qty: 20 CAPSULE | Refills: 0 | Status: SHIPPED | OUTPATIENT
Start: 2018-07-06 | End: 2019-02-05

## 2018-07-06 RX ORDER — ONDANSETRON 4 MG/1
4 TABLET, ORALLY DISINTEGRATING ORAL EVERY 8 HOURS PRN
Qty: 12 TABLET | Refills: 0 | Status: SHIPPED | OUTPATIENT
Start: 2018-07-06 | End: 2019-02-05

## 2018-07-06 RX ADMIN — SODIUM CHLORIDE 1000 ML: 9 INJECTION, SOLUTION INTRAVENOUS at 02:05

## 2018-07-06 NOTE — ED NOTES
"Pt reports dysuria, urinary frequency that started yesterday. Pt denies fever.     Pt gave urine sample today at PCP, but \"haven't heard back about that\".      Ashley Hobbs RN  07/06/18 0118    "

## 2018-07-06 NOTE — ED PROVIDER NOTES
Pt presents to the ED c/o increased urinary frequency w/ associated dysuria and nausea X one day. She denies abdominal pain, vomiting, and diarrhea. She was evaluated at her PCP yesterday, but did not do a UA. Pt has taken AZO w/o relief. On exam, pt is awake and alert in NAD, RRR, CTAB, abdomen nontender w/ positive BS, no guarding, no rebounding.     Attestation:    The K[facility]AN[facility] and I have discussed this patient's history, physical exam, and treatment plan.  I have reviewed the documentation and personally had a face to face interaction with the patient. I affirm the documentation and agree with the treatment and plan.  The attached note describes my personal findings.    Documentation assistance provided by markos Jorge for Dr. Han. Information recorded by the scribe was done at my direction and has been verified and validated by me.       Nallely Jorge  07/06/18 1590       Timbo Han MD  07/06/18 4130

## 2018-07-06 NOTE — ED PROVIDER NOTES
EMERGENCY DEPARTMENT ENCOUNTER    Room Number:  23/23  Date seen:  7/6/2018  Time seen: 1:30 AM  PCP: Landry Hoffman MD  Historian: patient, family  History Limited By: N/A      HPI:  Chief Complaint: dysuria  Context: Karly Mcgrath is a 72 y.o. female who presents to the ED c/o dysuria (described as burning with urination) that started about 2 days ago. It has not been alleviated with taking azo. She reports that she has also had urinary urgency, urinary frequency, lower abd pain, and nausea. She denies vomiting, diarrhea, difficulty with urination, back pain, fevers, chills, and hx of UTIs. She reports that she provided urine specimen for UA yesterday at PMD's office but the results are still pending. Pt has no other complaints at this time.    Location: female   Radiation: none  Quality: burning with urination  Intensity/Severity: moderate  Duration: started about 2 days ago  Onset quality: gradual  Timing: intermittent  Progression: worse  Aggravating Factors: urination  Alleviating Factors: none  Previous Episodes: none  Treatment before arrival: Pt reports that she provided urine specimen for UA yesterday at PMD's office but the results are still pending.   Associated Symptoms: urinary frequency, urinary urgency, lower abd pain, nausea      PAST MEDICAL HISTORY  Active Ambulatory Problems     Diagnosis Date Noted   • Anxiety 04/17/2016   • Bronchiectasis without complication (CMS/[facility]) 04/17/2016   • Hyperlipidemia 04/17/2016   • Hypertension 04/17/2016   • Osteopenia 04/17/2016   • Hypothyroidism 04/17/2016   • Health care maintenance 04/17/2016   • Herpes simplex type 1 infection 02/23/2018     Resolved Ambulatory Problems     Diagnosis Date Noted   • No Resolved Ambulatory Problems     Past Medical History:   Diagnosis Date   • Maxillary sinusitis 10/08/2015   • Pneumonia          PAST SURGICAL HISTORY  Past Surgical History:   Procedure Laterality Date   • APPENDECTOMY     • TUBAL ABDOMINAL LIGATION            FAMILY HISTORY  Family History   Problem Relation Age of Onset   • Heart attack Mother         Acute Myocardial Infarction   • Heart failure Mother         Congestive Heart Failure   • Coronary artery disease Brother         CABG         SOCIAL HISTORY  Social History     Social History   • Marital status:      Spouse name: N/A   • Number of children: N/A   • Years of education: N/A     Occupational History   • Not on file.     Social History Main Topics   • Smoking status: Never Smoker   • Smokeless tobacco: Never Used   • Alcohol use No   • Drug use: No   • Sexual activity: Not on file     Other Topics Concern   • Not on file     Social History Narrative   • No narrative on file         ALLERGIES  Eggs or egg-derived products and Sulfa antibiotics        REVIEW OF SYSTEMS  Review of Systems   Constitutional: Negative for chills and fever.   HENT: Negative for congestion, rhinorrhea and sore throat.    Eyes: Negative for pain.   Respiratory: Negative for cough and shortness of breath.    Cardiovascular: Negative for chest pain and palpitations.   Gastrointestinal: Positive for abdominal pain (lower abd pain) and nausea. Negative for diarrhea and vomiting.   Endocrine: Negative.    Genitourinary: Positive for dysuria, frequency and urgency. Negative for difficulty urinating.   Musculoskeletal: Negative for myalgias.   Skin: Negative.    Neurological: Negative for speech difficulty, weakness, numbness and headaches.   Psychiatric/Behavioral: Negative.    All other systems reviewed and are negative.           PHYSICAL EXAM  ED Triage Vitals   Temp Heart Rate Resp BP SpO2   07/06/18 0119 07/06/18 0119 07/06/18 0119 07/06/18 0137 07/06/18 0119   97.9 °F (36.6 °C) 85 18 178/87 97 % WNL      Temp src Heart Rate Source Patient Position BP Location FiO2 (%)   -- -- -- -- --            Physical Exam   Constitutional: She is oriented to person, place, and time. No distress.   HENT:   Head: Normocephalic.    Mouth/Throat: Mucous membranes are normal.   Eyes: EOM are normal. Pupils are equal, round, and reactive to light.   Neck: Normal range of motion. Neck supple.   Cardiovascular: Normal rate, regular rhythm and normal heart sounds.    Pulmonary/Chest: Effort normal and breath sounds normal. No respiratory distress. She has no decreased breath sounds. She has no wheezes. She has no rhonchi. She has no rales.   Abdominal: Soft. Bowel sounds are normal. There is no tenderness. There is no rebound, no guarding and no CVA tenderness.   Musculoskeletal: Normal range of motion.   Neurological: She is alert and oriented to person, place, and time. She has normal motor skills and normal sensation.   Skin: Skin is warm and dry.   Psychiatric: Her mood appears anxious.   Nursing note and vitals reviewed.          LAB RESULTS  Recent Results (from the past 24 hour(s))   Urinalysis With Culture If Indicated - Urine, Clean Catch    Collection Time: 07/06/18  1:36 AM   Result Value Ref Range    Color, UA Dark Yellow (A) Yellow, Straw    Appearance, UA Clear Clear    pH, UA 7.0 5.0 - 8.0    Specific Gravity, UA 1.011 1.005 - 1.030    Glucose, UA Negative Negative    Ketones, UA Negative Negative    Bilirubin, UA Negative Negative    Blood, UA Negative Negative    Protein, UA Negative Negative    Leuk Esterase, UA Trace (A) Negative    Nitrite, UA Positive (A) Negative    Urobilinogen, UA 1.0 E.U./dL 0.2 - 1.0 E.U./dL   Urinalysis, Microscopic Only - Urine, Clean Catch    Collection Time: 07/06/18  1:36 AM   Result Value Ref Range    RBC, UA 0-2 None Seen, 0-2 /HPF    WBC, UA 0-2 None Seen, 0-2 /HPF    Bacteria, UA None Seen None Seen /HPF    Squamous Epithelial Cells, UA 0-2 None Seen, 0-2 /HPF    Hyaline Casts, UA None Seen None Seen /LPF    Methodology Automated Microscopy    Comprehensive Metabolic Panel    Collection Time: 07/06/18  2:04 AM   Result Value Ref Range    Glucose 96 65 - 99 mg/dL    BUN 13 8 - 23 mg/dL     Creatinine 0.82 0.57 - 1.00 mg/dL    Sodium 126 (L) 136 - 145 mmol/L    Potassium 5.0 3.5 - 5.2 mmol/L    Chloride 91 (L) 98 - 107 mmol/L    CO2 23.3 22.0 - 29.0 mmol/L    Calcium 7.8 (L) 8.6 - 10.5 mg/dL    Total Protein 6.4 6.0 - 8.5 g/dL    Albumin 4.10 3.50 - 5.20 g/dL    ALT (SGPT) 10 1 - 33 U/L    AST (SGOT) 17 1 - 32 U/L    Alkaline Phosphatase 67 39 - 117 U/L    Total Bilirubin 0.5 0.1 - 1.2 mg/dL    eGFR Non African Amer 68 >60 mL/min/1.73    Globulin 2.3 gm/dL    A/G Ratio 1.8 g/dL    BUN/Creatinine Ratio 15.9 7.0 - 25.0    Anion Gap 11.7 mmol/L   CBC Auto Differential    Collection Time: 07/06/18  2:04 AM   Result Value Ref Range    WBC 4.78 4.50 - 10.70 10*3/mm3    RBC 3.94 3.90 - 5.20 10*6/mm3    Hemoglobin 11.6 (L) 11.9 - 15.5 g/dL    Hematocrit 33.5 (L) 35.6 - 45.5 %    MCV 85.0 80.5 - 98.2 fL    MCH 29.4 26.9 - 32.0 pg    MCHC 34.6 32.4 - 36.3 g/dL    RDW 13.6 (H) 11.7 - 13.0 %    RDW-SD 42.2 37.0 - 54.0 fl    MPV 11.6 6.0 - 12.0 fL    Platelets 151 140 - 500 10*3/mm3    Neutrophil % 53.9 42.7 - 76.0 %    Lymphocyte % 33.7 19.6 - 45.3 %    Monocyte % 10.3 5.0 - 12.0 %    Eosinophil % 1.9 0.3 - 6.2 %    Basophil % 0.2 0.0 - 1.5 %    Immature Grans % 0.2 0.0 - 0.5 %    Neutrophils, Absolute 2.58 1.90 - 8.10 10*3/mm3    Lymphocytes, Absolute 1.61 0.90 - 4.80 10*3/mm3    Monocytes, Absolute 0.49 0.20 - 1.20 10*3/mm3    Eosinophils, Absolute 0.09 0.00 - 0.70 10*3/mm3    Basophils, Absolute 0.01 0.00 - 0.20 10*3/mm3    Immature Grans, Absolute 0.01 0.00 - 0.03 10*3/mm3       Ordered the above labs and reviewed the results.        RADIOLOGY  CT Abdomen Pelvis Without Contrast (Final result) (independently viewed by me, interpreted by radiologist)    Result time 07/06/18 03:56:33   Final result by Matt Dobbins MD (07/06/18 03:56:33)                Impression:    1.  Constipation without obstruction or acute inflammation as best  assessed by noncontrast technique.  2. Low-density right renal lesion,  likely cyst              This study was performed with techniques to keep radiation doses as low  as reasonably achievable (ALARA). Individualized dose reduction  techniques using automated exposure control or adjustment of mA and/or  kV according to the patient size were employed.      This report was finalized on 7/6/2018 3:56 AM by Matt Dobbins M.D.               Narrative:    NONCONTRAST CT SCANS ABDOMEN AND PELVIS     HISTORY: suprapubic abd pain, dysuria     COMPARISON: None.     TECHNIQUE:Radiation dose reduction techniques were utilized, including  automated exposure control and exposure modulation based on body size.   Axial images were obtained from the lung bases to the symphysis pubis  without oral or IV contrast per request.      FINDINGS ABDOMEN CT:  There are chronic-appearing parenchymal changes  anteriorly in the lung bases. There is cortical renal atrophy without  nephrolithiasis or hydronephrosis. 12 mm lower pole right renal lesion  favored to reflect tiny cyst. Remaining solid organs are otherwise  unremarkable without benefit of IV contrast.     FINDINGS PELVIS CT:  Moderate constipation. The GI tract not opacified  for assessment but non obstructive in appearance. The appendix is unable  to be visualized for evaluation on this exam without oral or IV  contrast. No ascites. Encompassed aorta is non-aneurysmal.                     Ordered the above noted radiological studies. Reviewed by me in PACS.           PROCEDURES  Procedures          PROGRESS AND CONSULTS     0135- Ordered UA for further evaluation.     0154- Rechecked pt. She is resting comfortably and is in no acute distress. Discussed with pt and family about unremarkable UA results and plan to further evaluate her sx by obtaining blood work. Pt and family verbalize understanding and agreement with plan.     0156- UA is unremarkable. Ordered IV fluids for hydration and blood work for further evaluation.     0230- Discussed case with  Dr Han who agrees with the plan of care.     0328- Added CT abd/pel to orders for further evaluation.    0329- Rechecked pt. She is resting comfortably and is in no acute distress. Discussed with pt and family about all pertinent results including decreased sodium of 126 which has been decreased in the past, normal WBC count, normal renal function, and otherwise stable labs. Informed them of plan to obtain CT abd/pel to further evaluate pt's sx. They verbalize understanding and agreement with plan.     0413- Obtained CT abd/pel results-> constipation without obstruction, right renal lesion.     0414- Rechecked pt. She remains in no acute distress. Discussed with pt and family about CT abd/pel findings (constipation without obstruction, right renal lesion). Informed pt of plan to prescribe bentyl for abd discomfort, miralax for constipation, and antiemetic. Counseled pt on the importance of following up with PMD closely for recheck of condition and for further evaluation of right renal lesion seen on CT abd/pel. RTER warnings given. Pt understands and agrees with plan. Addressed all questions.        MEDICAL DECISION MAKING      MDM  Number of Diagnoses or Management Options     Amount and/or Complexity of Data Reviewed  Clinical lab tests: ordered and reviewed (UA, WBC= 4.78, BUN= 13, creatinine= 0.82, sodium level= 126)  Tests in the radiology section of CPT®: reviewed and ordered (CT abd/pel- constipation without obstruction, right renal lesion)  Decide to obtain previous medical records or to obtain history from someone other than the patient: yes  Review and summarize past medical records: yes (2 weeks ago, sodium level was 127. 1 month ago, sodium level was 132. )  Independent visualization of images, tracings, or specimens: yes    Patient Progress  Patient progress: stable             DIAGNOSIS  Final diagnoses:   Dysuria   Lower abdominal pain   Renal cyst, right         DISPOSITION  DISCHARGE    Patient  discharged in stable condition.    Reviewed implications of results, diagnosis, meds, responsibility to follow up, warning signs and symptoms of possible worsening, potential complications and reasons to return to ER.    Patient/Family voiced understanding of above instructions.    Discussed plan for discharge, as there is no emergent indication for admission.  Pt/family is agreeable and understands need for follow up and repeat testing.  Pt is aware that discharge does not mean that nothing is wrong but it indicates no emergency is present and they must continue care with follow-up as given below or physician of their choice.     FOLLOW-UP  Landry Hoffman MD  [address] [address]  [phone]    Schedule an appointment as soon as possible for a visit         DISCHARGE MEDICATIONS     Medication List      New Prescriptions    dicyclomine 10 MG capsule  Commonly known as:  BENTYL  Take 1 capsule by mouth 4 (Four) Times a Day As Needed (pain).     ondansetron ODT 4 MG disintegrating tablet  Commonly known as:  ZOFRAN ODT  Take 1 tablet by mouth Every 8 (Eight) Hours As Needed for Nausea.     polyethylene glycol packet  Commonly known as:  MIRALAX  Take 17 g by mouth Daily.            Latest Documented Vital Signs:  As of 4:38 AM  BP- 147/70 HR- 63 Temp- 97.9 °F (36.6 °C) O2 sat- 97%        --  Documentation assistance provided by markos Viera for SHERIN Garcia.  Information recorded by the scribe was done at my direction and has been verified and validated by me.       Aubrey Viera  07/06/18 0444       SHERIN Avalos  07/06/18 0457

## 2018-07-06 NOTE — DISCHARGE INSTRUCTIONS
Home, rest, medicine as directed, home medicine as prescribed, follow up with PCP for recheck and further evaluation. Return to care with further concerns.

## 2018-07-08 PROCEDURE — 99284 EMERGENCY DEPT VISIT MOD MDM: CPT

## 2018-07-09 ENCOUNTER — HOSPITAL ENCOUNTER (EMERGENCY)
Facility: HOSPITAL | Age: 73
Discharge: HOME | End: 2018-07-09
Attending: EMERGENCY MEDICINE | Admitting: EMERGENCY MEDICINE

## 2018-07-09 VITALS
OXYGEN SATURATION: 98 % | BODY MASS INDEX: 21.66 KG/M2 | WEIGHT: 130 LBS | RESPIRATION RATE: 14 BRPM | HEART RATE: 64 BPM | HEIGHT: 65 IN | TEMPERATURE: 97.5 F | DIASTOLIC BLOOD PRESSURE: 83 MMHG | SYSTOLIC BLOOD PRESSURE: 168 MMHG

## 2018-07-09 DIAGNOSIS — R39.9 UTI SYMPTOMS: Primary | ICD-10-CM

## 2018-07-09 LAB
ALBUMIN SERPL-MCNC: 4.5 G/DL (ref 3.5–5.2)
ALBUMIN/GLOB SERPL: 1.9 G/DL
ALP SERPL-CCNC: 76 U/L (ref 39–117)
ALT SERPL W P-5'-P-CCNC: 10 U/L (ref 1–33)
ANION GAP SERPL CALCULATED.3IONS-SCNC: 8.1 MMOL/L
AST SERPL-CCNC: 15 U/L (ref 1–32)
BACTERIA UR QL AUTO: NORMAL /HPF
BASOPHILS # BLD AUTO: 0.01 10*3/MM3 (ref 0–0.2)
BASOPHILS NFR BLD AUTO: 0.2 % (ref 0–1.5)
BILIRUB SERPL-MCNC: 0.4 MG/DL (ref 0.1–1.2)
BILIRUB UR QL STRIP: NEGATIVE
BUN BLD-MCNC: 13 MG/DL (ref 8–23)
BUN/CREAT SERPL: 12.9 (ref 7–25)
CALCIUM SPEC-SCNC: 9.4 MG/DL (ref 8.6–10.5)
CHLORIDE SERPL-SCNC: 89 MMOL/L (ref 98–107)
CLARITY UR: CLEAR
CO2 SERPL-SCNC: 29.9 MMOL/L (ref 22–29)
COLOR UR: ABNORMAL
CREAT BLD-MCNC: 1.01 MG/DL (ref 0.57–1)
DEPRECATED RDW RBC AUTO: 43.2 FL (ref 37–54)
EOSINOPHIL # BLD AUTO: 0.08 10*3/MM3 (ref 0–0.7)
EOSINOPHIL NFR BLD AUTO: 1.5 % (ref 0.3–6.2)
ERYTHROCYTE [DISTWIDTH] IN BLOOD BY AUTOMATED COUNT: 13.7 % (ref 11.7–13)
GFR SERPL CREATININE-BSD FRML MDRD: 54 ML/MIN/1.73
GLOBULIN UR ELPH-MCNC: 2.4 GM/DL
GLUCOSE BLD-MCNC: 101 MG/DL (ref 65–99)
GLUCOSE UR STRIP-MCNC: NEGATIVE MG/DL
HCT VFR BLD AUTO: 35.2 % (ref 35.6–45.5)
HGB BLD-MCNC: 11.8 G/DL (ref 11.9–15.5)
HGB UR QL STRIP.AUTO: NEGATIVE
HYALINE CASTS UR QL AUTO: NORMAL /LPF
IMM GRANULOCYTES # BLD: 0.02 10*3/MM3 (ref 0–0.03)
IMM GRANULOCYTES NFR BLD: 0.4 % (ref 0–0.5)
KETONES UR QL STRIP: NEGATIVE
LEUKOCYTE ESTERASE UR QL STRIP.AUTO: ABNORMAL
LIPASE SERPL-CCNC: 27 U/L (ref 13–60)
LYMPHOCYTES # BLD AUTO: 1.61 10*3/MM3 (ref 0.9–4.8)
LYMPHOCYTES NFR BLD AUTO: 30 % (ref 19.6–45.3)
MCH RBC QN AUTO: 29 PG (ref 26.9–32)
MCHC RBC AUTO-ENTMCNC: 33.5 G/DL (ref 32.4–36.3)
MCV RBC AUTO: 86.5 FL (ref 80.5–98.2)
MONOCYTES # BLD AUTO: 0.57 10*3/MM3 (ref 0.2–1.2)
MONOCYTES NFR BLD AUTO: 10.6 % (ref 5–12)
NEUTROPHILS # BLD AUTO: 3.07 10*3/MM3 (ref 1.9–8.1)
NEUTROPHILS NFR BLD AUTO: 57.3 % (ref 42.7–76)
NITRITE UR QL STRIP: POSITIVE
PH UR STRIP.AUTO: 7 [PH] (ref 5–8)
PLATELET # BLD AUTO: 145 10*3/MM3 (ref 140–500)
PMV BLD AUTO: 10.8 FL (ref 6–12)
POTASSIUM BLD-SCNC: 4.2 MMOL/L (ref 3.5–5.2)
PROT SERPL-MCNC: 6.9 G/DL (ref 6–8.5)
PROT UR QL STRIP: NEGATIVE
RBC # BLD AUTO: 4.07 10*6/MM3 (ref 3.9–5.2)
RBC # UR: NORMAL /HPF
REF LAB TEST METHOD: NORMAL
SODIUM BLD-SCNC: 127 MMOL/L (ref 136–145)
SP GR UR STRIP: 1.01 (ref 1–1.03)
SQUAMOUS #/AREA URNS HPF: NORMAL /HPF
UROBILINOGEN UR QL STRIP: ABNORMAL
WBC NRBC COR # BLD: 5.36 10*3/MM3 (ref 4.5–10.7)
WBC UR QL AUTO: NORMAL /HPF

## 2018-07-09 PROCEDURE — 85025 COMPLETE CBC W/AUTO DIFF WBC: CPT | Performed by: EMERGENCY MEDICINE

## 2018-07-09 PROCEDURE — 83690 ASSAY OF LIPASE: CPT | Performed by: EMERGENCY MEDICINE

## 2018-07-09 PROCEDURE — 36415 COLL VENOUS BLD VENIPUNCTURE: CPT

## 2018-07-09 PROCEDURE — 80053 COMPREHEN METABOLIC PANEL: CPT | Performed by: EMERGENCY MEDICINE

## 2018-07-09 PROCEDURE — 81001 URINALYSIS AUTO W/SCOPE: CPT | Performed by: EMERGENCY MEDICINE

## 2018-07-09 RX ORDER — SODIUM CHLORIDE 0.9 % (FLUSH) 0.9 %
10 SYRINGE (ML) INJECTION AS NEEDED
Status: DISCONTINUED | OUTPATIENT
Start: 2018-07-09 | End: 2018-07-09 | Stop reason: HOSPADM

## 2018-07-09 RX ORDER — CEPHALEXIN 500 MG/1
500 CAPSULE ORAL 2 TIMES DAILY
Qty: 14 CAPSULE | Refills: 0 | Status: SHIPPED | OUTPATIENT
Start: 2018-07-09 | End: 2018-07-13

## 2018-07-09 NOTE — ED PROVIDER NOTES
"EMERGENCY DEPARTMENT ENCOUNTER    Room Number:  14/14  Date seen:  7/9/2018  Time seen: 2:35 AM  PCP: Landry Hoffman MD  Historian: Patient, Family      HPI:  Chief complaint: Abdominal Pain  Context: Karly Mcgrath is a 72 y.o. female who presents to the ED c/o intermittent episodes of \"burning\" lower abdominal pain onset about 4-5 days ago. Pt reports that she has also had dysuria, nausea, urinary frequency, and urinary urgency. Pt denies vomiting, diarrhea, chest pain, dyspnea, and hematuria. Pt reports that she was seen for this in the ER on 07/06/18; at that time, pt's CT Abd showed constipation, but nothing acute otherwise and pt's UA was unremarkable. Pt was prescribed with rx for Bentyl, Zofran, and Miralax and was discharged for close f/u with her PMD. Pt states that she has taken the Bentyl, Zofran, and Miralax without significant sx relief. There are no other complaints at this time.     Onset Quality: Gradual in onset  Location: Lower abdomen   Radiation: None  Duration: Onset about 4-5 days ago  Timing: Intermittent   Character: \"burning\"  Aggravating Factors: Nothing  Alleviating Factors: Nothing  Severity: Moderate         MEDICAL RECORD REVIEW    Pt was seen in the ER on 07/06/18 secondary to dysuria. Pt's CT Abd showed constipation, but nothing acute otherwise. Pt's UA was unremarkable. Pt was prescribed with rx for Bentyl, Miralax, and antiemetic.       ALLERGIES  Eggs or egg-derived products and Sulfa antibiotics    PAST MEDICAL HISTORY  Active Ambulatory Problems     Diagnosis Date Noted   • Anxiety 04/17/2016   • Bronchiectasis without complication (CMS/HCC) 04/17/2016   • Hyperlipidemia 04/17/2016   • Hypertension 04/17/2016   • Osteopenia 04/17/2016   • Hypothyroidism 04/17/2016   • Health care maintenance 04/17/2016   • Herpes simplex type 1 infection 02/23/2018     Resolved Ambulatory Problems     Diagnosis Date Noted   • No Resolved Ambulatory Problems     Past Medical History: "   Diagnosis Date   • Maxillary sinusitis 10/08/2015   • Pneumonia        PAST SURGICAL HISTORY  Past Surgical History:   Procedure Laterality Date   • APPENDECTOMY     • TUBAL ABDOMINAL LIGATION         FAMILY HISTORY  Family History   Problem Relation Age of Onset   • Heart attack Mother         Acute Myocardial Infarction   • Heart failure Mother         Congestive Heart Failure   • Coronary artery disease Brother         CABG       SOCIAL HISTORY  Social History     Social History   • Marital status:      Spouse name: N/A   • Number of children: N/A   • Years of education: N/A     Occupational History   • Not on file.     Social History Main Topics   • Smoking status: Never Smoker   • Smokeless tobacco: Never Used   • Alcohol use No   • Drug use: No   • Sexual activity: Not on file     Other Topics Concern   • Not on file     Social History Narrative   • No narrative on file           REVIEW OF SYSTEMS  Review of Systems   Constitutional: Negative for chills.   HENT: Negative for congestion, rhinorrhea and sore throat.    Eyes: Negative for pain.   Respiratory: Negative for cough and shortness of breath.    Cardiovascular: Negative for chest pain, palpitations and leg swelling.   Gastrointestinal: Positive for abdominal pain and nausea. Negative for diarrhea and vomiting.   Genitourinary: Positive for dysuria, frequency (urinary) and urgency (urinary). Negative for difficulty urinating, flank pain and hematuria.   Musculoskeletal: Negative for myalgias, neck pain and neck stiffness.   Skin: Negative for rash.   Neurological: Negative for dizziness, speech difficulty, weakness, light-headedness, numbness and headaches.   Psychiatric/Behavioral: Negative.    All other systems reviewed and are negative.          PHYSICAL EXAM  ED Triage Vitals   Temp Heart Rate Resp BP SpO2   07/08/18 2355 07/08/18 2355 07/08/18 2355 07/09/18 0116 07/08/18 2355   97.9 °F (36.6 °C) 73 18 156/81 98 %      Temp src Heart Rate  Source Patient Position BP Location FiO2 (%)   07/08/18 2355 07/08/18 2355 07/09/18 0148 07/09/18 0148 --   Oral Monitor Lying Right arm      Physical Exam   Constitutional: She is oriented to person, place, and time. No distress.   HENT:   Head: Normocephalic.   Mouth/Throat: Mucous membranes are normal.   Eyes: EOM are normal. Pupils are equal, round, and reactive to light.   Neck: Normal range of motion. Neck supple.   Cardiovascular: Normal rate, regular rhythm and normal heart sounds.    Pulmonary/Chest: Effort normal and breath sounds normal. No respiratory distress. She has no decreased breath sounds. She has no wheezes. She has no rhonchi. She has no rales.   Abdominal: Soft. There is no tenderness. There is no rebound and no guarding.   Musculoskeletal: Normal range of motion.   Neurological: She is alert and oriented to person, place, and time. She has normal sensation.   Skin: Skin is warm and dry.   Psychiatric: Mood and affect normal.   Nursing note and vitals reviewed.          LAB RESULTS  Recent Results (from the past 24 hour(s))   Comprehensive Metabolic Panel    Collection Time: 07/09/18 12:47 AM   Result Value Ref Range    Glucose 101 (H) 65 - 99 mg/dL    BUN 13 8 - 23 mg/dL    Creatinine 1.01 (H) 0.57 - 1.00 mg/dL    Sodium 127 (L) 136 - 145 mmol/L    Potassium 4.2 3.5 - 5.2 mmol/L    Chloride 89 (L) 98 - 107 mmol/L    CO2 29.9 (H) 22.0 - 29.0 mmol/L    Calcium 9.4 8.6 - 10.5 mg/dL    Total Protein 6.9 6.0 - 8.5 g/dL    Albumin 4.50 3.50 - 5.20 g/dL    ALT (SGPT) 10 1 - 33 U/L    AST (SGOT) 15 1 - 32 U/L    Alkaline Phosphatase 76 39 - 117 U/L    Total Bilirubin 0.4 0.1 - 1.2 mg/dL    eGFR Non African Amer 54 (L) >60 mL/min/1.73    Globulin 2.4 gm/dL    A/G Ratio 1.9 g/dL    BUN/Creatinine Ratio 12.9 7.0 - 25.0    Anion Gap 8.1 mmol/L   Lipase    Collection Time: 07/09/18 12:47 AM   Result Value Ref Range    Lipase 27 13 - 60 U/L   Urinalysis With Microscopic If Indicated (No Culture) - Urine,  Clean Catch    Collection Time: 07/09/18 12:47 AM   Result Value Ref Range    Color, UA Dark Yellow (A) Yellow, Straw    Appearance, UA Clear Clear    pH, UA 7.0 5.0 - 8.0    Specific Gravity, UA 1.010 1.005 - 1.030    Glucose, UA Negative Negative    Ketones, UA Negative Negative    Bilirubin, UA Negative Negative    Blood, UA Negative Negative    Protein, UA Negative Negative    Leuk Esterase, UA Trace (A) Negative    Nitrite, UA Positive (A) Negative    Urobilinogen, UA 1.0 E.U./dL 0.2 - 1.0 E.U./dL   CBC Auto Differential    Collection Time: 07/09/18 12:47 AM   Result Value Ref Range    WBC 5.36 4.50 - 10.70 10*3/mm3    RBC 4.07 3.90 - 5.20 10*6/mm3    Hemoglobin 11.8 (L) 11.9 - 15.5 g/dL    Hematocrit 35.2 (L) 35.6 - 45.5 %    MCV 86.5 80.5 - 98.2 fL    MCH 29.0 26.9 - 32.0 pg    MCHC 33.5 32.4 - 36.3 g/dL    RDW 13.7 (H) 11.7 - 13.0 %    RDW-SD 43.2 37.0 - 54.0 fl    MPV 10.8 6.0 - 12.0 fL    Platelets 145 140 - 500 10*3/mm3    Neutrophil % 57.3 42.7 - 76.0 %    Lymphocyte % 30.0 19.6 - 45.3 %    Monocyte % 10.6 5.0 - 12.0 %    Eosinophil % 1.5 0.3 - 6.2 %    Basophil % 0.2 0.0 - 1.5 %    Immature Grans % 0.4 0.0 - 0.5 %    Neutrophils, Absolute 3.07 1.90 - 8.10 10*3/mm3    Lymphocytes, Absolute 1.61 0.90 - 4.80 10*3/mm3    Monocytes, Absolute 0.57 0.20 - 1.20 10*3/mm3    Eosinophils, Absolute 0.08 0.00 - 0.70 10*3/mm3    Basophils, Absolute 0.01 0.00 - 0.20 10*3/mm3    Immature Grans, Absolute 0.02 0.00 - 0.03 10*3/mm3   Urinalysis, Microscopic Only - Urine, Clean Catch    Collection Time: 07/09/18 12:47 AM   Result Value Ref Range    RBC, UA 0-2 None Seen, 0-2 /HPF    WBC, UA 0-2 None Seen, 0-2 /HPF    Bacteria, UA None Seen None Seen /HPF    Squamous Epithelial Cells, UA 0-2 None Seen, 0-2 /HPF    Hyaline Casts, UA 0-2 None Seen /LPF    Methodology Automated Microscopy        I ordered the above labs and reviewed the results        MEDICATIONS GIVEN IN ER  Medications   sodium chloride 0.9 % flush 10 mL  "(not administered)           PROCEDURES  Procedures          PROGRESS AND CONSULTS    Progress Notes:         2:54 AM:  Reviewed pt's history and workup with Dr. Headley (ER physician).  After a bedside evaluation, Dr. Headley agrees with the plan of care.    3:27 AM:  Rechecked pt. Pt is resting comfortably and appears in no acute distress. Informed pt that her WBC is WNL. UA results are (+) for nitrites -> pt will be prescribed with rx for antibiotics to treat for probable UTI. Pt was advised to f/u with PMD. RTER warnings given. Pt agrees with plan for discharge.           Disposition vitals:  /80   Pulse 65   Temp 97.5 °F (36.4 °C) (Oral)   Resp 14   Ht 165.1 cm (65\")   Wt 59 kg (130 lb)   SpO2 98%   BMI 21.63 kg/m²           DIAGNOSIS  Final diagnoses:   UTI symptoms           DISPOSITION  Pt discharged.      DISCHARGE    Patient discharged in stable condition.    Reviewed implications of results, diagnosis, meds, responsibility to follow up, warning signs and symptoms of possible worsening, potential complications and reasons to return to ER.    Patient/Family voiced understanding of above instructions.    Discussed plan for discharge, as there is no emergent indication for admission. Pt/family is agreeable and understands need for follow up and repeat testing. Pt is aware that discharge does not mean that nothing is wrong but it indicates no emergency is present that requires admission and they must continue care with follow-up as given below or physician of their choice.     FOLLOW-UP  Landry Hoffman MD  [address] [id number] [phone]    In 3 days  For further evaluation and treatment if not better         Medication List      New Prescriptions    cephalexin 500 MG capsule  Commonly known as:  KEFLEX  Take 1 capsule by mouth 2 (Two) Times a Day.                        Documentation assistance provided by markos Viera for Mr. Suleiman Ojeda PA-C.  Information " recorded by the scribe was done at my direction and has been verified and validated by me.                 Kayla Viera  07/09/18 0352       SHERIN Lockhart III  07/09/18 0504

## 2018-07-09 NOTE — ED NOTES
Patient reports lower abdominal pain pain and burning since Thursday morning with urinary frequency. Seen her Friday states the medications she was given have note helped.      Vandana Ruiz RN  07/08/18 [id number]

## 2018-07-09 NOTE — ED PROVIDER NOTES
Pt presents to the ED c/o lower abd pain that began 3 days ago. Pt also complains of dysuria and urinary frequency. She denies  N/V or any other sx. On exam, Pt is resting comfortably, in no distress, and without focal neurologic deficit. Cardiovascular exam is within normal limits and without murmur. Abd is soft and non tender. I agree with plan to treat UTI with abx.      Attestation:  The KHANH and I have discussed this patient's history, physical exam, and treatment plan.  I have reviewed the documentation and personally had a face to face interaction with the patient. I affirm the documentation and agree with the treatment and plan.  The attached note describes my personal findings.      Documentation assistance provided by markos Hanna for Dr Headley Information recorded by the markos was done at my direction and has been verified and validated by me.     Mirella Hanna  07/09/18 [id number]       Herberth Headley MD  07/09/18 0541

## 2018-07-09 NOTE — ED NOTES
Provided patient with urine cup. Will attempt to obtain urine specimen at this time.      Vandana Ruiz RN  07/09/18 0030

## 2018-07-11 ENCOUNTER — TELEPHONE (OUTPATIENT)
Dept: SOCIAL WORK | Facility: HOSPITAL | Age: 73
End: 2018-07-11

## 2018-07-16 ENCOUNTER — OFFICE VISIT (OUTPATIENT)
Dept: INTERNAL MEDICINE | Facility: CLINIC | Age: 73
End: 2018-07-16

## 2018-07-16 VITALS
BODY MASS INDEX: 21.79 KG/M2 | HEIGHT: 65 IN | RESPIRATION RATE: 16 BRPM | DIASTOLIC BLOOD PRESSURE: 70 MMHG | SYSTOLIC BLOOD PRESSURE: 130 MMHG | TEMPERATURE: 98.3 F | OXYGEN SATURATION: 98 % | WEIGHT: 130.8 LBS | HEART RATE: 68 BPM

## 2018-07-16 DIAGNOSIS — N34.2 URETHRITIS: Primary | ICD-10-CM

## 2018-07-16 PROCEDURE — 99213 OFFICE O/P EST LOW 20 MIN: CPT | Performed by: INTERNAL MEDICINE

## 2018-07-16 RX ORDER — AZITHROMYCIN 500 MG/1
TABLET, FILM COATED ORAL
Qty: 2 TABLET | Refills: 0 | Status: SHIPPED | OUTPATIENT
Start: 2018-07-16 | End: 2019-02-05

## 2018-07-16 NOTE — PROGRESS NOTES
Subjective   Karly Mcgrath is a 72 y.o. female.   7/4/2018  Wt Readings from Last 1 Encounters:   07/16/18 59.3 kg (130 lb 12.8 oz)   She returns for follow-up of dysuria and frequency    History of Present Illness   Review the chart indicates that the patient has had emergency room visits July 6 in July 9 with urinary infection and was seen at urgent care July 13.  Most recently she was prescribed nitrofurantoin.  Her urine culture has been negative.  Urinalysis had positive nitrite but no other abnormalities noted.  She describes frequency and dysuria but no fever or chills.  She has not been troubled with frequent urinary infections in the past.  The following portions of the patient's history were reviewed and updated as appropriate: allergies, current medications, past family history, past medical history, past social history, past surgical history and problem list.    Review of Systems   Genitourinary: Positive for dysuria and frequency.       Objective   Physical Exam   Abdominal: Soft. Bowel sounds are normal. She exhibits no distension and no mass. There is no tenderness. There is no rebound and no guarding.       Assessment/Plan   Karly was seen today for urinary tract infection.    Diagnoses and all orders for this visit:    Urethritis  Comments:  We will treat with azithromycin 500 mg, 2 pills as a single dose and Pyridium when necessary for burning up to 3 times a day  Orders:  -     Urine Culture - Urine, Urine, Clean Catch  -     Urinalysis With Microscopic - Urine, Clean Catch    Other orders  -     azithromycin (ZITHROMAX) 500 MG tablet; 2 by mouth as a single dose      We will also treat her  with azithromycin 1 g single dose  Call if not better by Friday, then refer her urologist                       EMR Dragon/Transcription disclaimer:      Much of this encounter note is an electronic transcription/translation of spoken language to printed text. The electronic translation of spoken  language may permit erroneous, or at times, nonsensical words or phrases to be inadvertently transcribed; Although I have reviewed the note for such errors, some may still exist.

## 2018-07-18 DIAGNOSIS — N28.89 URETERITIS: Primary | ICD-10-CM

## 2018-07-19 LAB
APPEARANCE UR: CLEAR
BACTERIA #/AREA URNS HPF: ABNORMAL /[HPF]
BACTERIA UR CULT: NORMAL
BACTERIA UR CULT: NORMAL
BILIRUB UR QL STRIP: NEGATIVE
CASTS URNS MICRO: ABNORMAL
CASTS URNS QL MICRO: PRESENT /LPF
COLOR UR: YELLOW
EPI CELLS #/AREA URNS HPF: ABNORMAL /HPF
GLUCOSE UR QL: NEGATIVE
HGB UR QL STRIP: NEGATIVE
KETONES UR QL STRIP: (no result)
LEUKOCYTE ESTERASE UR QL STRIP: NEGATIVE
MICRO URNS: (no result)
MICRO URNS: (no result)
MUCOUS THREADS URNS QL MICRO: PRESENT
NITRITE UR QL STRIP: NEGATIVE
PH UR STRIP: 5 [PH] (ref 5–7.5)
PROT UR QL STRIP: (no result)
RBC #/AREA URNS HPF: ABNORMAL /HPF
SP GR UR: 1.03 (ref 1–1.03)
UROBILINOGEN UR STRIP-MCNC: 1 MG/DL (ref 0.2–1)
WBC #/AREA URNS HPF: ABNORMAL /HPF

## 2018-08-07 RX ORDER — METOPROLOL SUCCINATE 50 MG/1
50 TABLET, EXTENDED RELEASE ORAL DAILY
Qty: 90 TABLET | Refills: 0 | Status: SHIPPED | OUTPATIENT
Start: 2018-08-07 | End: 2018-11-04 | Stop reason: SDUPTHER

## 2018-08-07 RX ORDER — LEVOTHYROXINE SODIUM 0.05 MG/1
50 TABLET ORAL DAILY
Qty: 90 TABLET | Refills: 0 | Status: SHIPPED | OUTPATIENT
Start: 2018-08-07 | End: 2018-11-04 | Stop reason: SDUPTHER

## 2018-09-05 ENCOUNTER — HOSPITAL ENCOUNTER (OUTPATIENT)
Dept: CT IMAGING | Facility: HOSPITAL | Age: 73
Discharge: HOME | End: 2018-09-05
Attending: INTERNAL MEDICINE | Admitting: INTERNAL MEDICINE

## 2018-09-05 DIAGNOSIS — J47.9 BRONCHIECTASIS WITHOUT COMPLICATION (HCC): ICD-10-CM

## 2018-09-05 PROCEDURE — 71250 CT THORAX DX C-: CPT

## 2018-09-13 RX ORDER — LOSARTAN POTASSIUM 50 MG/1
TABLET ORAL
Qty: 90 TABLET | Refills: 0 | Status: SHIPPED | OUTPATIENT
Start: 2018-09-13 | End: 2018-12-10 | Stop reason: SDUPTHER

## 2018-09-13 RX ORDER — SPIRONOLACTONE 25 MG/1
TABLET ORAL
Qty: 90 TABLET | Refills: 0 | Status: SHIPPED | OUTPATIENT
Start: 2018-09-13 | End: 2018-12-10 | Stop reason: SDUPTHER

## 2018-09-14 ENCOUNTER — TRANSCRIBE ORDERS (OUTPATIENT)
Dept: ADMINISTRATIVE | Facility: HOSPITAL | Age: 73
End: 2018-09-14

## 2018-09-14 DIAGNOSIS — R91.8 LUNG INFILTRATE: ICD-10-CM

## 2018-09-14 DIAGNOSIS — R91.1 LUNG NODULE: Primary | ICD-10-CM

## 2018-09-18 ENCOUNTER — ANESTHESIA EVENT (OUTPATIENT)
Dept: GASTROENTEROLOGY | Facility: HOSPITAL | Age: 73
End: 2018-09-18

## 2018-09-18 ENCOUNTER — ANESTHESIA (OUTPATIENT)
Dept: GASTROENTEROLOGY | Facility: HOSPITAL | Age: 73
End: 2018-09-18

## 2018-09-18 ENCOUNTER — HOSPITAL ENCOUNTER (OUTPATIENT)
Facility: HOSPITAL | Age: 73
Setting detail: HOSPITAL OUTPATIENT SURGERY
Discharge: HOME | End: 2018-09-18
Attending: INTERNAL MEDICINE | Admitting: INTERNAL MEDICINE

## 2018-09-18 VITALS
OXYGEN SATURATION: 98 % | BODY MASS INDEX: 21.56 KG/M2 | TEMPERATURE: 98.3 F | SYSTOLIC BLOOD PRESSURE: 128 MMHG | HEART RATE: 66 BPM | RESPIRATION RATE: 18 BRPM | HEIGHT: 65 IN | DIASTOLIC BLOOD PRESSURE: 78 MMHG | WEIGHT: 129.44 LBS

## 2018-09-18 DIAGNOSIS — J18.9 PNA (PNEUMONIA): ICD-10-CM

## 2018-09-18 LAB
APPEARANCE FLD: ABNORMAL
COLOR FLD: ABNORMAL
LYMPHOCYTES NFR FLD MANUAL: 7 %
MONOS+MACROS NFR FLD: 63 %
NEUTROPHILS NFR FLD MANUAL: 30 %
OTHER CELLS FLUID PER 100/WBCS: 50 /100 WBCS
RBC # FLD AUTO: 995 /MM3
WBC # FLD: 825 /MM3

## 2018-09-18 PROCEDURE — 25010000002 PROPOFOL 10 MG/ML EMULSION: Performed by: NURSE ANESTHETIST, CERTIFIED REGISTERED

## 2018-09-18 PROCEDURE — 87102 FUNGUS ISOLATION CULTURE: CPT | Performed by: INTERNAL MEDICINE

## 2018-09-18 PROCEDURE — 88185 FLOWCYTOMETRY/TC ADD-ON: CPT | Performed by: INTERNAL MEDICINE

## 2018-09-18 PROCEDURE — 87205 SMEAR GRAM STAIN: CPT | Performed by: INTERNAL MEDICINE

## 2018-09-18 PROCEDURE — 88112 CYTOPATH CELL ENHANCE TECH: CPT | Performed by: INTERNAL MEDICINE

## 2018-09-18 PROCEDURE — 88184 FLOWCYTOMETRY/ TC 1 MARKER: CPT | Performed by: INTERNAL MEDICINE

## 2018-09-18 PROCEDURE — 88189 FLOWCYTOMETRY/READ 16 & >: CPT | Performed by: INTERNAL MEDICINE

## 2018-09-18 PROCEDURE — 87206 SMEAR FLUORESCENT/ACID STAI: CPT | Performed by: INTERNAL MEDICINE

## 2018-09-18 PROCEDURE — 87071 CULTURE AEROBIC QUANT OTHER: CPT | Performed by: INTERNAL MEDICINE

## 2018-09-18 PROCEDURE — 89051 BODY FLUID CELL COUNT: CPT | Performed by: INTERNAL MEDICINE

## 2018-09-18 PROCEDURE — 88312 SPECIAL STAINS GROUP 1: CPT | Performed by: INTERNAL MEDICINE

## 2018-09-18 PROCEDURE — 25010000002 PROPOFOL 1000 MG/ML EMULSION: Performed by: NURSE ANESTHETIST, CERTIFIED REGISTERED

## 2018-09-18 RX ORDER — LIDOCAINE HYDROCHLORIDE 20 MG/ML
INJECTION, SOLUTION EPIDURAL; INFILTRATION; INTRACAUDAL; PERINEURAL AS NEEDED
Status: DISCONTINUED | OUTPATIENT
Start: 2018-09-18 | End: 2018-09-18 | Stop reason: HOSPADM

## 2018-09-18 RX ORDER — LIDOCAINE HYDROCHLORIDE 10 MG/ML
INJECTION, SOLUTION EPIDURAL; INFILTRATION; INTRACAUDAL; PERINEURAL AS NEEDED
Status: DISCONTINUED | OUTPATIENT
Start: 2018-09-18 | End: 2018-09-18 | Stop reason: HOSPADM

## 2018-09-18 RX ORDER — LIDOCAINE HYDROCHLORIDE 20 MG/ML
INJECTION, SOLUTION INFILTRATION; PERINEURAL AS NEEDED
Status: DISCONTINUED | OUTPATIENT
Start: 2018-09-18 | End: 2018-09-18 | Stop reason: SURG

## 2018-09-18 RX ORDER — PROPOFOL 10 MG/ML
VIAL (ML) INTRAVENOUS AS NEEDED
Status: DISCONTINUED | OUTPATIENT
Start: 2018-09-18 | End: 2018-09-18 | Stop reason: SURG

## 2018-09-18 RX ORDER — SODIUM CHLORIDE, SODIUM LACTATE, POTASSIUM CHLORIDE, CALCIUM CHLORIDE 600; 310; 30; 20 MG/100ML; MG/100ML; MG/100ML; MG/100ML
1000 INJECTION, SOLUTION INTRAVENOUS CONTINUOUS
Status: DISCONTINUED | OUTPATIENT
Start: 2018-09-18 | End: 2018-09-18 | Stop reason: HOSPADM

## 2018-09-18 RX ADMIN — PROPOFOL 200 MCG/KG/MIN: 10 INJECTION, EMULSION INTRAVENOUS at 13:28

## 2018-09-18 RX ADMIN — PROPOFOL 150 MG: 10 INJECTION, EMULSION INTRAVENOUS at 13:24

## 2018-09-18 RX ADMIN — SODIUM CHLORIDE, POTASSIUM CHLORIDE, SODIUM LACTATE AND CALCIUM CHLORIDE 1000 ML: 600; 310; 30; 20 INJECTION, SOLUTION INTRAVENOUS at 12:46

## 2018-09-18 RX ADMIN — LIDOCAINE HYDROCHLORIDE 60 MG: 20 INJECTION, SOLUTION INFILTRATION; PERINEURAL at 13:24

## 2018-09-18 NOTE — ANESTHESIA PREPROCEDURE EVALUATION
Anesthesia Evaluation     Patient summary reviewed and Nursing notes reviewed                Airway   Mallampati: II  TM distance: >3 FB  Neck ROM: full  No difficulty expected  Dental    (+) upper dentures    Pulmonary - normal exam   (+) pneumonia ,   Cardiovascular - normal exam    (+) hypertension, hyperlipidemia,       Neuro/Psych  (+) psychiatric history Anxiety,     GI/Hepatic/Renal/Endo    (+)   hypothyroidism,     Musculoskeletal     Abdominal  - normal exam   Substance History      OB/GYN          Other                        Anesthesia Plan    ASA 3     general   (LMA)  intravenous induction   Anesthetic plan, all risks, benefits, and alternatives have been provided, discussed and informed consent has been obtained with: patient.

## 2018-09-18 NOTE — DISCHARGE INSTRUCTIONS
Flexible Bronchoscopy, Care After  This sheet gives you information about how to care for yourself after your procedure. Your health care provider may also give you more specific instructions. If you have problems or questions, contact your health care provider.  What can I expect after the procedure?  After the procedure, it is common to have the following symptoms for 24-48 hours:  · A cough that is worse than it was before the procedure.  · A low-grade fever.  · A sore throat or hoarse voice.  · Small streaks of blood in the mucus from your lungs (sputum), if tissue samples were removed (biopsy).    Follow these instructions at home:  Eating and drinking  · Do not eat or drink anything (including water) for 2 hours after your procedure, or until your numbing medicine (local anesthetic) has worn off. Having a numb throat increases your risk of burning yourself or choking.  · After your numbness is gone and your cough and gag reflexes have returned, you may start eating only soft foods and slowly drinking liquids.  · The day after the procedure, return to your normal diet.  Driving  · Do not drive for 24 hours if you were given a medicine to help you relax (sedative).  · Do not drive or use heavy machinery while taking prescription pain medicine.  General instructions  · Take over-the-counter and prescription medicines only as told by your health care provider.  · Return to your normal activities as told by your health care provider. Ask your health care provider what activities are safe for you.  · Do not use any products that contain nicotine or tobacco, such as cigarettes and e-cigarettes. If you need help quitting, ask your health care provider.  · Keep all follow-up visits as told by your health care provider. This is important, especially if you had a biopsy taken.  Get help right away if:  · You have shortness of breath that gets worse.  · You become light-headed or feel like you might faint.  · You have  chest pain.  · You cough up more than a small amount of blood.  · The amount of blood you cough up increases.  Summary  · Common symptoms in the 24-48 hours following a flexible bronchoscopy include cough, low-grade fever, sore throat or hoarse voice, and blood-streaked mucus from the lungs (if you had a biopsy).  · Do not eat or drink anything (including water) for 2 hours after your procedure, or until your local anesthetic has worn off. You can return to your normal diet the day after the procedure.  · Get help right away if you develop worsening shortness of breath, have chest pain, become light-headed, or cough up more than a small amount of blood.  This information is not intended to replace advice given to you by your health care provider. Make sure you discuss any questions you have with your health care provider.  Document Released: 07/07/2006 Document Revised: 01/05/2018 Document Reviewed: 01/05/2018  [employer] Patient Education © 2017 [employer]

## 2018-09-18 NOTE — ANESTHESIA POSTPROCEDURE EVALUATION
Patient: Karly Mcgrath    Procedure Summary     Date:  09/18/18 Room / Location:  [facility] ENDOSCOPY 7 / [facility] ENDOSCOPY    Anesthesia Start:  1305 Anesthesia Stop:  1355    Procedure:  BRONCHOSCOPY WITH BAL (N/A Bronchus) Diagnosis:      Surgeon:  Josué Espinosa MD Provider:  Melchor Fox MD    Anesthesia Type:  general ASA Status:  3          Anesthesia Type: general  Last vitals  BP   128/78 (09/18/18 1405)   Temp   36.8 °C (98.3 °F) (09/18/18 1355)   Pulse   66 (09/18/18 1405)   Resp   18 (09/18/18 1405)     SpO2   98 % (09/18/18 1405)     Post Anesthesia Care and Evaluation    Patient location during evaluation: PHASE II  Patient participation: complete - patient participated  Level of consciousness: awake and alert  Pain management: adequate  Airway patency: patent  Anesthetic complications: No anesthetic complications  PONV Status: none  Cardiovascular status: acceptable  Respiratory status: acceptable  Hydration status: acceptable

## 2018-09-18 NOTE — OP NOTE
Bronchoscopy Procedure Note    Procedure:  1. Bronchoscopy, Diagnostic  2. Bronchoalveolar lavage, BAL    Pre-Operative Diagnosis: RLL pna/ infiltrate    Post-Operative Diagnosis: Same    Anesthesia: Monitored Anesthesia Care (MAC)    Procedure Details: Patient was consented for the procedure with all risk and benefit of the procedure explained in detail.  Patient was given the opportunity to ask questions and all concerns were answered.  The bronchocope was inserted into the main airway via the LMA. A detailed anatomical survey was done of the airways from the trachea till  the visualized subsegmental bronchi and the findings are as reported below.      A bronchoalveolar lavage was performed using aliquots of normal saline, a total of 60 cc was instilled into the airways and 35 cc was aspirated back.    Findings:  1) Normal larynx, trachea, bilateral mainstem, lobar and visualized segmental bronchi other than mentioned below  2) Successful BAL of the RLL/ Medial segment was performed as mentioned above.      Estimated Blood Loss: Minimal                Complications: None; patient tolerated the procedure well.           Disposition: Endoscopy Recovery room    Patient tolerated the procedure well and I have updated the family at bedside in the recovery room.    Patient will get a call from my office staff once results of the testing are available in a few days. He was asked to call the office if he has any new chest pain, shortness of breath or worsening cough or hemoptysis. Mild hemoptysis and fever overnight are expected and OTC tylenol is recommended.     Josué Espinosa MD  9/18/2018  1:39 PM

## 2018-09-18 NOTE — ANESTHESIA PROCEDURE NOTES
Airway  Urgency: elective    Date/Time: 9/18/2018 1:27 PM  Airway not difficult    General Information and Staff    Patient location during procedure: OR  Anesthesiologist: MOHAN GUTIERREZ  CRNA: JIMMY HESTER    Indications and Patient Condition  Indications for airway management: airway protection    Preoxygenated: yes  Mask difficulty assessment: 0 - not attempted    Final Airway Details  Final airway type: supraglottic airway      Successful airway: classic  Size 4    Number of attempts at approach: 1    Additional Comments  Atraumatic. No dental damage noted.

## 2018-09-19 LAB — GIE STN SPEC: NORMAL

## 2018-09-20 LAB
BACTERIA SPEC AEROBE CULT: NORMAL
CYTO UR: NORMAL
GRAM STN SPEC: NORMAL
LAB AP CASE REPORT: NORMAL
LAB AP CLINICAL INFORMATION: NORMAL
PATH REPORT.FINAL DX SPEC: NORMAL
PATH REPORT.GROSS SPEC: NORMAL

## 2018-09-21 LAB — REF LAB TEST METHOD: NORMAL

## 2018-10-01 LAB
FUNGUS WND CULT: ABNORMAL
Lab: NO

## 2018-10-02 RX ORDER — ATORVASTATIN CALCIUM 10 MG/1
TABLET, FILM COATED ORAL
Qty: 90 TABLET | Refills: 0 | Status: SHIPPED | OUTPATIENT
Start: 2018-10-02 | End: 2018-12-29 | Stop reason: SDUPTHER

## 2018-10-08 ENCOUNTER — EPISODE CHANGES (OUTPATIENT)
Dept: CASE MANAGEMENT | Facility: OTHER | Age: 73
End: 2018-10-08

## 2018-11-05 RX ORDER — METOPROLOL SUCCINATE 50 MG/1
50 TABLET, EXTENDED RELEASE ORAL DAILY
Qty: 90 TABLET | Refills: 0 | Status: SHIPPED | OUTPATIENT
Start: 2018-11-05 | End: 2019-01-31 | Stop reason: SDUPTHER

## 2018-11-05 RX ORDER — LEVOTHYROXINE SODIUM 0.05 MG/1
50 TABLET ORAL DAILY
Qty: 90 TABLET | Refills: 0 | Status: SHIPPED | OUTPATIENT
Start: 2018-11-05 | End: 2019-01-31 | Stop reason: SDUPTHER

## 2018-11-13 ENCOUNTER — HOSPITAL ENCOUNTER (OUTPATIENT)
Dept: CT IMAGING | Facility: HOSPITAL | Age: 73
Discharge: HOME | End: 2018-11-13
Attending: INTERNAL MEDICINE | Admitting: INTERNAL MEDICINE

## 2018-11-13 DIAGNOSIS — R91.1 LUNG NODULE: ICD-10-CM

## 2018-11-13 DIAGNOSIS — R91.8 LUNG INFILTRATE: ICD-10-CM

## 2018-11-13 PROCEDURE — 71250 CT THORAX DX C-: CPT

## 2018-11-19 ENCOUNTER — OFFICE VISIT (OUTPATIENT)
Dept: INTERNAL MEDICINE | Facility: CLINIC | Age: 73
End: 2018-11-19

## 2018-11-19 VITALS
BODY MASS INDEX: 22.23 KG/M2 | DIASTOLIC BLOOD PRESSURE: 90 MMHG | TEMPERATURE: 97.6 F | SYSTOLIC BLOOD PRESSURE: 140 MMHG | RESPIRATION RATE: 16 BRPM | WEIGHT: 133.4 LBS | HEART RATE: 57 BPM | OXYGEN SATURATION: 98 % | HEIGHT: 65 IN

## 2018-11-19 DIAGNOSIS — E78.5 HYPERLIPIDEMIA, UNSPECIFIED HYPERLIPIDEMIA TYPE: ICD-10-CM

## 2018-11-19 DIAGNOSIS — I10 ESSENTIAL HYPERTENSION: Primary | ICD-10-CM

## 2018-11-19 DIAGNOSIS — E03.9 ACQUIRED HYPOTHYROIDISM: ICD-10-CM

## 2018-11-19 DIAGNOSIS — E55.9 HYPOVITAMINOSIS D: ICD-10-CM

## 2018-11-19 DIAGNOSIS — J47.9 BRONCHIECTASIS WITHOUT COMPLICATION (HCC): ICD-10-CM

## 2018-11-19 PROCEDURE — 99213 OFFICE O/P EST LOW 20 MIN: CPT | Performed by: INTERNAL MEDICINE

## 2018-11-19 RX ORDER — BUSPIRONE HYDROCHLORIDE 10 MG/1
TABLET ORAL
Qty: 180 TABLET | Refills: 3 | Status: SHIPPED | OUTPATIENT
Start: 2018-11-19 | End: 2020-02-25 | Stop reason: SDUPTHER

## 2018-11-19 NOTE — PROGRESS NOTES
Subjective   Karly Mcgrath is a 73 y.o. female.   11/18/2018  Wt Readings from Last 1 Encounters:   11/19/18 60.5 kg (133 lb 6.4 oz)   She was last seen in July for acute care visit.  I    She returns for follow-up of hypertension, hyperlipidemia, hypothyroidism, bronchiectasis    History of Present Illness   She had a hospitalization in September of for left upper lobe pneumonia.  She had a follow-up CT scan November 13 but the results have not been released yet.  She does not have any current pulmonary symptoms.  She does have history of bronchiectasis.  She is to see her pulmonologist this week for follow-up.    She has hypertension treated with losartan 50 mg daily, metoprolol succinate 50 mg daily and spironolactone 25 mg daily.  She does not have any known cardiac disease    She has hyperlipidemia treated with atorvastatin 10 mg daily.  No medication problems described.    She has hypothyroidism treated with levothyroxin 50 µg daily.  No palpitations or other thyroid symptoms are described.    She has osteopenia treated with Caltrate D and supplemental vitamin D two thousand international units daily  The following portions of the patient's history were reviewed and updated as appropriate: allergies, current medications, past family history, past medical history, past social history, past surgical history and problem list.    Review of Systems   Constitutional: Negative.    HENT: Negative.    Eyes: Negative.    Respiratory: Negative.    Cardiovascular: Negative.    Endocrine: Negative.    Genitourinary: Negative.    Skin: Negative.    Neurological: Negative.    Hematological: Negative.    Psychiatric/Behavioral: Negative.        Objective   Physical Exam   Constitutional: She appears well-developed and well-nourished.   HENT:   Head: Normocephalic and atraumatic.   Cardiovascular: Normal rate and regular rhythm. Exam reveals no gallop and no friction rub.   No murmur heard.  Pulmonary/Chest: Effort normal. No  respiratory distress. She has no wheezes. She has rales.   Some coarse breath sounds on the right   Abdominal: Soft. Bowel sounds are normal. She exhibits no distension and no mass. There is no tenderness.   Neurological: She is alert.   Skin: Skin is warm.   Psychiatric: Her behavior is normal.   Vitals reviewed.      Assessment/Plan   Karly was seen today for hypertension, hyperlipidemia and hypothyroidism.    Diagnoses and all orders for this visit:    Essential hypertension  Comments:  Blood pressure 140/74, left arm, continue losartan 50 mg daily, metoprolol succinate 50 mg daily and spironolactone 25 mg daily  Orders:  -     Comprehensive Metabolic Panel  -     Urinalysis With Microscopic - Urine, Clean Catch    Hyperlipidemia, unspecified hyperlipidemia type  Comments:  Continue atorvastatin 10 mg daily, we will check chemistries and lipids  Orders:  -     Comprehensive Metabolic Panel  -     Lipid Panel    Acquired hypothyroidism  Comments:  Continue levothyroxin 50 µg daily, we will check thyroid levels  Orders:  -     CBC & Differential  -     TSH  -     T4, Free    Bronchiectasis without complication (CMS/HCC)  Comments:  Followed by pulmonologist, CT scan results November 13 pending    Hypovitaminosis D  Comments:  Continue vitamin D 2000 international units daily, we will check vitamin D level  Orders:  -     Vitamin D 25 Hydroxy        Advise office follow-up about 6 months, return sooner if needed                       EMR Dragon/Transcription disclaimer:      Much of this encounter note is an electronic transcription/translation of spoken language to printed text. The electronic translation of spoken language may permit erroneous, or at times, nonsensical words or phrases to be inadvertently transcribed; Although I have reviewed the note for such errors, some may still exist.

## 2018-11-20 LAB
25(OH)D3+25(OH)D2 SERPL-MCNC: 66.2 NG/ML (ref 30–100)
ALBUMIN SERPL-MCNC: 4.2 G/DL (ref 3.5–5.2)
ALBUMIN/GLOB SERPL: 1.6 G/DL
ALP SERPL-CCNC: 81 U/L (ref 39–117)
ALT SERPL-CCNC: 11 U/L (ref 1–33)
APPEARANCE UR: CLEAR
AST SERPL-CCNC: 19 U/L (ref 1–32)
BACTERIA #/AREA URNS HPF: NORMAL /HPF
BASOPHILS # BLD AUTO: 0.02 10*3/MM3 (ref 0–0.2)
BASOPHILS NFR BLD AUTO: 0.3 % (ref 0–1.5)
BILIRUB SERPL-MCNC: 0.7 MG/DL (ref 0.1–1.2)
BILIRUB UR QL STRIP: NEGATIVE
BUN SERPL-MCNC: 21 MG/DL (ref 8–23)
BUN/CREAT SERPL: 21.6 (ref 7–25)
CALCIUM SERPL-MCNC: 9.5 MG/DL (ref 8.6–10.5)
CASTS URNS MICRO: NORMAL
CHLORIDE SERPL-SCNC: 95 MMOL/L (ref 98–107)
CHOLEST SERPL-MCNC: 142 MG/DL (ref 0–200)
CO2 SERPL-SCNC: 25.5 MMOL/L (ref 22–29)
COLOR UR: YELLOW
CREAT SERPL-MCNC: 0.97 MG/DL (ref 0.57–1)
EOSINOPHIL # BLD AUTO: 0.07 10*3/MM3 (ref 0–0.7)
EOSINOPHIL NFR BLD AUTO: 1 % (ref 0.3–6.2)
EPI CELLS #/AREA URNS HPF: NORMAL /HPF
ERYTHROCYTE [DISTWIDTH] IN BLOOD BY AUTOMATED COUNT: 13.3 % (ref 11.7–13)
GLOBULIN SER CALC-MCNC: 2.6 GM/DL
GLUCOSE SERPL-MCNC: 92 MG/DL (ref 65–99)
GLUCOSE UR QL: NEGATIVE
HCT VFR BLD AUTO: 37.2 % (ref 35.6–45.5)
HDLC SERPL-MCNC: 72 MG/DL (ref 40–60)
HGB BLD-MCNC: 12.2 G/DL (ref 11.9–15.5)
HGB UR QL STRIP: NEGATIVE
IMM GRANULOCYTES # BLD: 0.03 10*3/MM3 (ref 0–0.03)
IMM GRANULOCYTES NFR BLD: 0.4 % (ref 0–0.5)
KETONES UR QL STRIP: NEGATIVE
LDLC SERPL CALC-MCNC: 59 MG/DL (ref 0–100)
LEUKOCYTE ESTERASE UR QL STRIP: NEGATIVE
LYMPHOCYTES # BLD AUTO: 1.39 10*3/MM3 (ref 0.9–4.8)
LYMPHOCYTES NFR BLD AUTO: 18.9 % (ref 19.6–45.3)
MCH RBC QN AUTO: 29.3 PG (ref 26.9–32)
MCHC RBC AUTO-ENTMCNC: 32.8 G/DL (ref 32.4–36.3)
MCV RBC AUTO: 89.4 FL (ref 80.5–98.2)
MONOCYTES # BLD AUTO: 0.47 10*3/MM3 (ref 0.2–1.2)
MONOCYTES NFR BLD AUTO: 6.4 % (ref 5–12)
NEUTROPHILS # BLD AUTO: 5.4 10*3/MM3 (ref 1.9–8.1)
NEUTROPHILS NFR BLD AUTO: 73.4 % (ref 42.7–76)
NITRITE UR QL STRIP: NEGATIVE
PH UR STRIP: 7.5 [PH] (ref 5–8)
PLATELET # BLD AUTO: 182 10*3/MM3 (ref 140–500)
POTASSIUM SERPL-SCNC: 4.3 MMOL/L (ref 3.5–5.2)
PROT SERPL-MCNC: 6.8 G/DL (ref 6–8.5)
PROT UR QL STRIP: NEGATIVE
RBC # BLD AUTO: 4.16 10*6/MM3 (ref 3.9–5.2)
RBC #/AREA URNS HPF: NORMAL /HPF
SODIUM SERPL-SCNC: 133 MMOL/L (ref 136–145)
SP GR UR: 1.01 (ref 1–1.03)
T4 FREE SERPL-MCNC: 1.21 NG/DL (ref 0.93–1.7)
TRIGL SERPL-MCNC: 56 MG/DL (ref 0–150)
TSH SERPL DL<=0.005 MIU/L-ACNC: 2.82 MIU/ML (ref 0.27–4.2)
UROBILINOGEN UR STRIP-MCNC: (no result) MG/DL
VLDLC SERPL CALC-MCNC: 11.2 MG/DL (ref 5–40)
WBC # BLD AUTO: 7.35 10*3/MM3 (ref 4.5–10.7)
WBC #/AREA URNS HPF: NORMAL /HPF

## 2018-12-06 RX ORDER — POTASSIUM CHLORIDE 750 MG/1
TABLET, FILM COATED, EXTENDED RELEASE ORAL
Qty: 180 TABLET | Refills: 3 | Status: SHIPPED | OUTPATIENT
Start: 2018-12-06 | End: 2019-02-05

## 2018-12-10 RX ORDER — SPIRONOLACTONE 25 MG/1
TABLET ORAL
Qty: 90 TABLET | Refills: 3 | Status: SHIPPED | OUTPATIENT
Start: 2018-12-10 | End: 2019-02-05

## 2018-12-10 RX ORDER — LOSARTAN POTASSIUM 50 MG/1
TABLET ORAL
Qty: 90 TABLET | Refills: 3 | Status: SHIPPED | OUTPATIENT
Start: 2018-12-10 | End: 2019-12-04 | Stop reason: SDUPTHER

## 2018-12-31 RX ORDER — ATORVASTATIN CALCIUM 10 MG/1
TABLET, FILM COATED ORAL
Qty: 90 TABLET | Refills: 3 | Status: SHIPPED | OUTPATIENT
Start: 2018-12-31 | End: 2020-01-20

## 2019-01-25 ENCOUNTER — PATIENT OUTREACH (OUTPATIENT)
Dept: CASE MANAGEMENT | Facility: OTHER | Age: 74
End: 2019-01-25

## 2019-01-25 ENCOUNTER — EPISODE CHANGES (OUTPATIENT)
Dept: CASE MANAGEMENT | Facility: OTHER | Age: 74
End: 2019-01-25

## 2019-01-31 RX ORDER — METOPROLOL SUCCINATE 50 MG/1
50 TABLET, EXTENDED RELEASE ORAL DAILY
Qty: 90 TABLET | Refills: 3 | Status: SHIPPED | OUTPATIENT
Start: 2019-01-31 | End: 2020-01-20

## 2019-01-31 RX ORDER — LEVOTHYROXINE SODIUM 0.05 MG/1
50 TABLET ORAL DAILY
Qty: 90 TABLET | Refills: 3 | Status: SHIPPED | OUTPATIENT
Start: 2019-01-31 | End: 2019-12-12 | Stop reason: SDUPTHER

## 2019-02-05 ENCOUNTER — OFFICE VISIT (OUTPATIENT)
Dept: INTERNAL MEDICINE | Facility: CLINIC | Age: 74
End: 2019-02-05

## 2019-02-05 VITALS
HEART RATE: 72 BPM | TEMPERATURE: 97.4 F | WEIGHT: 133 LBS | OXYGEN SATURATION: 95 % | SYSTOLIC BLOOD PRESSURE: 122 MMHG | DIASTOLIC BLOOD PRESSURE: 60 MMHG | BODY MASS INDEX: 22.16 KG/M2 | HEIGHT: 65 IN

## 2019-02-05 DIAGNOSIS — E55.9 HYPOVITAMINOSIS D: ICD-10-CM

## 2019-02-05 DIAGNOSIS — F41.9 ANXIETY: ICD-10-CM

## 2019-02-05 DIAGNOSIS — E78.2 MIXED HYPERLIPIDEMIA: Primary | ICD-10-CM

## 2019-02-05 DIAGNOSIS — E03.9 ACQUIRED HYPOTHYROIDISM: ICD-10-CM

## 2019-02-05 DIAGNOSIS — I10 ESSENTIAL HYPERTENSION: ICD-10-CM

## 2019-02-05 PROCEDURE — 99214 OFFICE O/P EST MOD 30 MIN: CPT | Performed by: FAMILY MEDICINE

## 2019-02-05 RX ORDER — ACYCLOVIR 200 MG/1
CAPSULE ORAL AS NEEDED
Refills: 2 | COMMUNITY
Start: 2019-01-09 | End: 2019-02-05 | Stop reason: SDUPTHER

## 2019-02-05 RX ORDER — POTASSIUM CHLORIDE 750 MG/1
10 TABLET, FILM COATED, EXTENDED RELEASE ORAL DAILY
Qty: 90 TABLET | Refills: 3
Start: 2019-02-05 | End: 2019-06-03

## 2019-02-05 RX ORDER — ACYCLOVIR 200 MG/1
200 CAPSULE ORAL
Qty: 25 CAPSULE | Refills: 2
Start: 2019-02-05

## 2019-02-05 NOTE — PROGRESS NOTES
Karly Mcgrath is a 73 y.o. female.      Assessment/Plan   Problem List Items Addressed This Visit        Cardiovascular and Mediastinum    Hyperlipidemia - Primary    Hypertension    Relevant Orders    Comprehensive Metabolic Panel       Digestive    Hypovitaminosis D       Endocrine    Hypothyroidism       Other    Anxiety         .  Aldactone reduce potassium 20 mg once once daily check CMP 1 month monitor blood pressure or any other unwanted side effects results with patient at that time        Chief Complaint   Patient presents with   • Hypothyroidism   • Hypertension   • Hyperlipidemia   • Anxiety   • Vitamin D Deficiency     Social History     Tobacco Use   • Smoking status: Never Smoker   • Smokeless tobacco: Never Used   Substance Use Topics   • Alcohol use: No   • Drug use: No       History of Present Illness   Patient new to this office with chronic problems hyperlipidemia hypertension and vitamin D deficiency hypothyroidism and anxiety doing well has recently had labs performed November she has no acute concerns other than when she's taking the medicine she is taking.  Some discussions regarding her diuretics as well as her potassium supplement other than that her blood pressure seems well controlled and she does not want side effects  The following portions of the patient's history were reviewed and updated as appropriate:PMHroutine: Social history , Past Medical History, Surgical history , Allergies, Current Medications, Active Problem List and Health Maintenance    Review of Systems   Constitutional: Negative for activity change, appetite change and unexpected weight change.   HENT: Negative for nosebleeds and trouble swallowing.    Eyes: Negative for pain and visual disturbance.   Respiratory: Negative for chest tightness, shortness of breath and wheezing.    Cardiovascular: Negative for chest pain and palpitations.   Gastrointestinal: Negative for abdominal pain and blood in stool.   Endocrine:  "Negative.    Genitourinary: Negative for difficulty urinating and hematuria.   Musculoskeletal: Negative for joint swelling.   Skin: Negative for color change and rash.   Allergic/Immunologic: Negative.    Neurological: Negative for syncope and speech difficulty.   Hematological: Negative for adenopathy.   Psychiatric/Behavioral: Negative for agitation and confusion.   All other systems reviewed and are negative.      Objective   Vitals:    02/05/19 0846   BP: 122/60   Pulse: 72   Temp: 97.4 °F (36.3 °C)   SpO2: 95%   Weight: 60.3 kg (133 lb)   Height: 165 cm (64.96\")     Body mass index is 22.16 kg/m².  Physical Exam   Constitutional: She is oriented to person, place, and time. She appears well-developed and well-nourished. No distress.   HENT:   Head: Normocephalic and atraumatic.   Eyes: Conjunctivae and EOM are normal. Pupils are equal, round, and reactive to light. Right eye exhibits no discharge. Left eye exhibits no discharge. No scleral icterus.   Neck: Normal range of motion. Neck supple. No tracheal deviation present. No thyromegaly present.   Cardiovascular: Normal rate, regular rhythm, normal heart sounds, intact distal pulses and normal pulses. Exam reveals no gallop.   No murmur heard.  Pulmonary/Chest: Effort normal and breath sounds normal. No respiratory distress. She has no wheezes. She has no rales.   Musculoskeletal: Normal range of motion. She exhibits no edema.   Neurological: She is alert and oriented to person, place, and time. She exhibits normal muscle tone. Coordination normal.   Skin: Skin is warm. No rash noted. No erythema. No pallor.   Psychiatric: She has a normal mood and affect. Her behavior is normal. Judgment and thought content normal.   Nursing note and vitals reviewed.    Reviewed Data:  No visits with results within 1 Month(s) from this visit.   Latest known visit with results is:   Office Visit on 11/19/2018   Component Date Value Ref Range Status   • WBC 11/19/2018 7.35  " 4.50 - 10.70 10*3/mm3 Final   • RBC 11/19/2018 4.16  3.90 - 5.20 10*6/mm3 Final   • Hemoglobin 11/19/2018 12.2  11.9 - 15.5 g/dL Final   • Hematocrit 11/19/2018 37.2  35.6 - 45.5 % Final   • MCV 11/19/2018 89.4  80.5 - 98.2 fL Final   • MCH 11/19/2018 29.3  26.9 - 32.0 pg Final   • MCHC 11/19/2018 32.8  32.4 - 36.3 g/dL Final   • RDW 11/19/2018 13.3* 11.7 - 13.0 % Final   • Platelets 11/19/2018 182  140 - 500 10*3/mm3 Final   • Neutrophil Rel % 11/19/2018 73.4  42.7 - 76.0 % Final   • Lymphocyte Rel % 11/19/2018 18.9* 19.6 - 45.3 % Final   • Monocyte Rel % 11/19/2018 6.4  5.0 - 12.0 % Final   • Eosinophil Rel % 11/19/2018 1.0  0.3 - 6.2 % Final   • Basophil Rel % 11/19/2018 0.3  0.0 - 1.5 % Final   • Neutrophils Absolute 11/19/2018 5.40  1.90 - 8.10 10*3/mm3 Final   • Lymphocytes Absolute 11/19/2018 1.39  0.90 - 4.80 10*3/mm3 Final   • Monocytes Absolute 11/19/2018 0.47  0.20 - 1.20 10*3/mm3 Final   • Eosinophils Absolute 11/19/2018 0.07  0.00 - 0.70 10*3/mm3 Final   • Basophils Absolute 11/19/2018 0.02  0.00 - 0.20 10*3/mm3 Final   • Immature Granulocyte Rel % 11/19/2018 0.4  0.0 - 0.5 % Final   • Immature Grans Absolute 11/19/2018 0.03  0.00 - 0.03 10*3/mm3 Final   • Glucose 11/19/2018 92  65 - 99 mg/dL Final   • BUN 11/19/2018 21  8 - 23 mg/dL Final   • Creatinine 11/19/2018 0.97  0.57 - 1.00 mg/dL Final   • eGFR Non  Am 11/19/2018 56* >60 mL/min/1.73 Final    Comment: The MDRD GFR formula is only valid for adults with stable  renal function between ages 18 and 70.     • eGFR  Am 11/19/2018 68  >60 mL/min/1.73 Final   • BUN/Creatinine Ratio 11/19/2018 21.6  7.0 - 25.0 Final   • Sodium 11/19/2018 133* 136 - 145 mmol/L Final   • Potassium 11/19/2018 4.3  3.5 - 5.2 mmol/L Final   • Chloride 11/19/2018 95* 98 - 107 mmol/L Final   • Total CO2 11/19/2018 25.5  22.0 - 29.0 mmol/L Final   • Calcium 11/19/2018 9.5  8.6 - 10.5 mg/dL Final   • Total Protein 11/19/2018 6.8  6.0 - 8.5 g/dL Final   • Albumin  11/19/2018 4.20  3.50 - 5.20 g/dL Final   • Globulin 11/19/2018 2.6  gm/dL Final   • A/G Ratio 11/19/2018 1.6  g/dL Final   • Total Bilirubin 11/19/2018 0.7  0.1 - 1.2 mg/dL Final   • Alkaline Phosphatase 11/19/2018 81  39 - 117 U/L Final   • AST (SGOT) 11/19/2018 19  1 - 32 U/L Final   • ALT (SGPT) 11/19/2018 11  1 - 33 U/L Final   • Total Cholesterol 11/19/2018 142  0 - 200 mg/dL Final   • Triglycerides 11/19/2018 56  0 - 150 mg/dL Final   • HDL Cholesterol 11/19/2018 72* 40 - 60 mg/dL Final   • VLDL Cholesterol 11/19/2018 11.2  5 - 40 mg/dL Final   • LDL Cholesterol  11/19/2018 59  0 - 100 mg/dL Final   • TSH 11/19/2018 2.820  0.270 - 4.200 mIU/mL Final   • Free T4 11/19/2018 1.21  0.93 - 1.70 ng/dL Final   • 25 Hydroxy, Vitamin D 11/19/2018 66.2  30.0 - 100.0 ng/ml Final    Comment: Reference Range for Total Vitamin D 25(OH)  Deficiency    <20.0 ng/mL  Insufficiency 21-29 ng/mL  Sufficiency    ng/mL  Toxicity      >100 ng/ml        • Specific Gravity, UA 11/19/2018 1.015  1.005 - 1.030 Final   • pH, UA 11/19/2018 7.5  5.0 - 8.0 Final   • Color, UA 11/19/2018 Yellow   Final    REFERENCE RANGE: Yellow, Straw   • Appearance, UA 11/19/2018 Clear  Clear Final   • Leukocytes, UA 11/19/2018 Negative  Negative Final   • Protein 11/19/2018 Negative  Negative Final   • Glucose, UA 11/19/2018 Negative  Negative Final   • Ketones 11/19/2018 Negative  Negative Final   • Blood, UA 11/19/2018 Negative  Negative Final   • Bilirubin, UA 11/19/2018 Negative  Negative Final   • Urobilinogen, UA 11/19/2018 Comment   Final    Comment: 0.2 E.U./dL  REFERENCE RANGE: 0.2 - 1.0 E.U./dL     • Nitrite, UA 11/19/2018 Negative  Negative Final   • WBC, UA 11/19/2018 0-2  /HPF Final    REFERENCE RANGE: None Seen, 0-2   • RBC, UA 11/19/2018 0-2  /HPF Final    REFERENCE RANGE: None Seen, 0-2   • Epithelial Cells (non renal) 11/19/2018 0-2  /HPF Final    REFERENCE RANGE: None Seen, 0-2   • Cast Type 11/19/2018 Comment   Final    HYALINE  CASTS, UA            None Seen        /LPF       None Seen  N   • Bacteria, UA 11/19/2018 Comment  None Seen /HPF Final    None Seen

## 2019-02-08 ENCOUNTER — PATIENT OUTREACH (OUTPATIENT)
Dept: CASE MANAGEMENT | Facility: OTHER | Age: 74
End: 2019-02-08

## 2019-02-12 ENCOUNTER — PATIENT OUTREACH (OUTPATIENT)
Dept: CASE MANAGEMENT | Facility: OTHER | Age: 74
End: 2019-02-12

## 2019-02-19 ENCOUNTER — OFFICE VISIT (OUTPATIENT)
Dept: INTERNAL MEDICINE | Facility: CLINIC | Age: 74
End: 2019-02-19

## 2019-02-19 VITALS
RESPIRATION RATE: 18 BRPM | WEIGHT: 140.1 LBS | DIASTOLIC BLOOD PRESSURE: 66 MMHG | TEMPERATURE: 97.7 F | BODY MASS INDEX: 23.34 KG/M2 | OXYGEN SATURATION: 98 % | SYSTOLIC BLOOD PRESSURE: 142 MMHG | HEART RATE: 72 BPM

## 2019-02-19 DIAGNOSIS — J01.00 ACUTE NON-RECURRENT MAXILLARY SINUSITIS: Primary | ICD-10-CM

## 2019-02-19 PROCEDURE — 99213 OFFICE O/P EST LOW 20 MIN: CPT | Performed by: FAMILY MEDICINE

## 2019-02-19 RX ORDER — AMOXICILLIN AND CLAVULANATE POTASSIUM 875; 125 MG/1; MG/1
1 TABLET, FILM COATED ORAL 2 TIMES DAILY
Qty: 20 TABLET | Refills: 20 | Status: SHIPPED | OUTPATIENT
Start: 2019-02-19 | End: 2019-06-03

## 2019-02-22 PROBLEM — J01.00 ACUTE NON-RECURRENT MAXILLARY SINUSITIS: Status: ACTIVE | Noted: 2019-02-22

## 2019-02-22 NOTE — PROGRESS NOTES
Karly Mcgrath is a 73 y.o. female.      Assessment/Plan   Problem List Items Addressed This Visit        Respiratory    Acute non-recurrent maxillary sinusitis - Primary         Augmentin follow-up as needed or    Return if symptoms worsen or fail to improve, for Recheck.      Chief Complaint   Patient presents with   • Cough     x 3 days    • Sinusitis     sinus headache      Social History     Tobacco Use   • Smoking status: Never Smoker   • Smokeless tobacco: Never Used   Substance Use Topics   • Alcohol use: No   • Drug use: No       History of Present Illness   Patient has new concern of sinus pain bilaterally in the face some minimally productive cough with history of bronchiectasis doing well with her inhalers she has no specific fever no ear pain or sore throat no improvement with over-the-counter remedies symptoms persisted for 3 days  The following portions of the patient's history were reviewed and updated as appropriate:Review of Historical Elements: Social history , Past Medical History, Allergies, Current Medications, Active Problem List and Health Maintenance    Review of Systems   Constitutional: Positive for fatigue. Negative for activity change and unexpected weight change.   HENT: Positive for congestion, postnasal drip and sore throat. Negative for ear pain.    Eyes: Negative for pain and discharge.   Respiratory: Positive for cough. Negative for chest tightness, shortness of breath and wheezing.    Cardiovascular: Negative for chest pain and palpitations.   Gastrointestinal: Negative for abdominal pain, diarrhea and vomiting.   Endocrine: Negative.    Genitourinary: Negative.    Musculoskeletal: Negative for joint swelling.   Skin: Negative for color change, rash and wound.   Allergic/Immunologic: Negative.    Neurological: Negative for seizures and syncope.   Psychiatric/Behavioral: Negative.        Objective   Vitals:    02/19/19 1044   BP: 142/66   BP Location: Left arm   Patient Position:  Sitting   Cuff Size: Adult   Pulse: 72   Resp: 18   Temp: 97.7 °F (36.5 °C)   TempSrc: Oral   SpO2: 98%   Weight: 63.5 kg (140 lb 1.6 oz)     Body mass index is 23.34 kg/m².  Physical Exam   Constitutional: She is oriented to person, place, and time. She appears well-developed and well-nourished.  Non-toxic appearance. No distress.   HENT:   Head: Normocephalic and atraumatic. Hair is normal.   Right Ear: External ear normal. No drainage, swelling or tenderness. Tympanic membrane is retracted.   Left Ear: External ear normal. No drainage, swelling or tenderness. Tympanic membrane is retracted.   Nose: Mucosal edema present. No epistaxis. Right sinus exhibits maxillary sinus tenderness. Left sinus exhibits maxillary sinus tenderness.   Mouth/Throat: Uvula is midline and mucous membranes are normal. No oral lesions. No uvula swelling. Posterior oropharyngeal erythema present. No oropharyngeal exudate.   Eyes: EOM are normal. Right eye exhibits no discharge. Left eye exhibits no discharge. No scleral icterus.   Neck: Normal range of motion. Neck supple.   Cardiovascular: Normal rate, regular rhythm and normal heart sounds. Exam reveals no gallop.   No murmur heard.  Pulmonary/Chest: Breath sounds normal. No stridor. No respiratory distress. She has no wheezes. She has no rales. She exhibits no tenderness.   Abdominal: Soft. There is no tenderness.   Lymphadenopathy:     She has cervical adenopathy.   Neurological: She is alert and oriented to person, place, and time. She exhibits normal muscle tone.   Skin: Skin is warm and dry. No rash noted. She is not diaphoretic.   Psychiatric: She has a normal mood and affect. Her behavior is normal. Judgment and thought content normal.   Nursing note and vitals reviewed.    Reviewed Data:  No visits with results within 1 Month(s) from this visit.   Latest known visit with results is:   Office Visit on 11/19/2018   Component Date Value Ref Range Status   • WBC 11/19/2018 7.35   4.50 - 10.70 10*3/mm3 Final   • RBC 11/19/2018 4.16  3.90 - 5.20 10*6/mm3 Final   • Hemoglobin 11/19/2018 12.2  11.9 - 15.5 g/dL Final   • Hematocrit 11/19/2018 37.2  35.6 - 45.5 % Final   • MCV 11/19/2018 89.4  80.5 - 98.2 fL Final   • MCH 11/19/2018 29.3  26.9 - 32.0 pg Final   • MCHC 11/19/2018 32.8  32.4 - 36.3 g/dL Final   • RDW 11/19/2018 13.3* 11.7 - 13.0 % Final   • Platelets 11/19/2018 182  140 - 500 10*3/mm3 Final   • Neutrophil Rel % 11/19/2018 73.4  42.7 - 76.0 % Final   • Lymphocyte Rel % 11/19/2018 18.9* 19.6 - 45.3 % Final   • Monocyte Rel % 11/19/2018 6.4  5.0 - 12.0 % Final   • Eosinophil Rel % 11/19/2018 1.0  0.3 - 6.2 % Final   • Basophil Rel % 11/19/2018 0.3  0.0 - 1.5 % Final   • Neutrophils Absolute 11/19/2018 5.40  1.90 - 8.10 10*3/mm3 Final   • Lymphocytes Absolute 11/19/2018 1.39  0.90 - 4.80 10*3/mm3 Final   • Monocytes Absolute 11/19/2018 0.47  0.20 - 1.20 10*3/mm3 Final   • Eosinophils Absolute 11/19/2018 0.07  0.00 - 0.70 10*3/mm3 Final   • Basophils Absolute 11/19/2018 0.02  0.00 - 0.20 10*3/mm3 Final   • Immature Granulocyte Rel % 11/19/2018 0.4  0.0 - 0.5 % Final   • Immature Grans Absolute 11/19/2018 0.03  0.00 - 0.03 10*3/mm3 Final   • Glucose 11/19/2018 92  65 - 99 mg/dL Final   • BUN 11/19/2018 21  8 - 23 mg/dL Final   • Creatinine 11/19/2018 0.97  0.57 - 1.00 mg/dL Final   • eGFR Non  Am 11/19/2018 56* >60 mL/min/1.73 Final    Comment: The MDRD GFR formula is only valid for adults with stable  renal function between ages 18 and 70.     • eGFR  Am 11/19/2018 68  >60 mL/min/1.73 Final   • BUN/Creatinine Ratio 11/19/2018 21.6  7.0 - 25.0 Final   • Sodium 11/19/2018 133* 136 - 145 mmol/L Final   • Potassium 11/19/2018 4.3  3.5 - 5.2 mmol/L Final   • Chloride 11/19/2018 95* 98 - 107 mmol/L Final   • Total CO2 11/19/2018 25.5  22.0 - 29.0 mmol/L Final   • Calcium 11/19/2018 9.5  8.6 - 10.5 mg/dL Final   • Total Protein 11/19/2018 6.8  6.0 - 8.5 g/dL Final   • Albumin  11/19/2018 4.20  3.50 - 5.20 g/dL Final   • Globulin 11/19/2018 2.6  gm/dL Final   • A/G Ratio 11/19/2018 1.6  g/dL Final   • Total Bilirubin 11/19/2018 0.7  0.1 - 1.2 mg/dL Final   • Alkaline Phosphatase 11/19/2018 81  39 - 117 U/L Final   • AST (SGOT) 11/19/2018 19  1 - 32 U/L Final   • ALT (SGPT) 11/19/2018 11  1 - 33 U/L Final   • Total Cholesterol 11/19/2018 142  0 - 200 mg/dL Final   • Triglycerides 11/19/2018 56  0 - 150 mg/dL Final   • HDL Cholesterol 11/19/2018 72* 40 - 60 mg/dL Final   • VLDL Cholesterol 11/19/2018 11.2  5 - 40 mg/dL Final   • LDL Cholesterol  11/19/2018 59  0 - 100 mg/dL Final   • TSH 11/19/2018 2.820  0.270 - 4.200 mIU/mL Final   • Free T4 11/19/2018 1.21  0.93 - 1.70 ng/dL Final   • 25 Hydroxy, Vitamin D 11/19/2018 66.2  30.0 - 100.0 ng/ml Final    Comment: Reference Range for Total Vitamin D 25(OH)  Deficiency    <20.0 ng/mL  Insufficiency 21-29 ng/mL  Sufficiency    ng/mL  Toxicity      >100 ng/ml        • Specific Gravity, UA 11/19/2018 1.015  1.005 - 1.030 Final   • pH, UA 11/19/2018 7.5  5.0 - 8.0 Final   • Color, UA 11/19/2018 Yellow   Final    REFERENCE RANGE: Yellow, Straw   • Appearance, UA 11/19/2018 Clear  Clear Final   • Leukocytes, UA 11/19/2018 Negative  Negative Final   • Protein 11/19/2018 Negative  Negative Final   • Glucose, UA 11/19/2018 Negative  Negative Final   • Ketones 11/19/2018 Negative  Negative Final   • Blood, UA 11/19/2018 Negative  Negative Final   • Bilirubin, UA 11/19/2018 Negative  Negative Final   • Urobilinogen, UA 11/19/2018 Comment   Final    Comment: 0.2 E.U./dL  REFERENCE RANGE: 0.2 - 1.0 E.U./dL     • Nitrite, UA 11/19/2018 Negative  Negative Final   • WBC, UA 11/19/2018 0-2  /HPF Final    REFERENCE RANGE: None Seen, 0-2   • RBC, UA 11/19/2018 0-2  /HPF Final    REFERENCE RANGE: None Seen, 0-2   • Epithelial Cells (non renal) 11/19/2018 0-2  /HPF Final    REFERENCE RANGE: None Seen, 0-2   • Cast Type 11/19/2018 Comment   Final    HYALINE  CASTS, UA            None Seen        /LPF       None Seen  N   • Bacteria, UA 11/19/2018 Comment  None Seen /HPF Final    None Seen

## 2019-02-25 ENCOUNTER — PATIENT OUTREACH (OUTPATIENT)
Dept: CASE MANAGEMENT | Facility: OTHER | Age: 74
End: 2019-02-25

## 2019-03-01 DIAGNOSIS — I10 ESSENTIAL HYPERTENSION: ICD-10-CM

## 2019-03-05 LAB
ALBUMIN SERPL-MCNC: 4.2 G/DL (ref 3.5–5.2)
ALBUMIN/GLOB SERPL: 1.8 G/DL
ALP SERPL-CCNC: 74 U/L (ref 39–117)
ALT SERPL-CCNC: 13 U/L (ref 1–33)
AST SERPL-CCNC: 21 U/L (ref 1–32)
BILIRUB SERPL-MCNC: 0.7 MG/DL (ref 0.1–1.2)
BUN SERPL-MCNC: 23 MG/DL (ref 8–23)
BUN/CREAT SERPL: 23.5 (ref 7–25)
CALCIUM SERPL-MCNC: 9.7 MG/DL (ref 8.6–10.5)
CHLORIDE SERPL-SCNC: 101 MMOL/L (ref 98–107)
CO2 SERPL-SCNC: 26.5 MMOL/L (ref 22–29)
CREAT SERPL-MCNC: 0.98 MG/DL (ref 0.57–1)
GLOBULIN SER CALC-MCNC: 2.4 GM/DL
GLUCOSE SERPL-MCNC: 92 MG/DL (ref 65–99)
POTASSIUM SERPL-SCNC: 3.9 MMOL/L (ref 3.5–5.2)
PROT SERPL-MCNC: 6.6 G/DL (ref 6–8.5)
SODIUM SERPL-SCNC: 141 MMOL/L (ref 136–145)

## 2019-03-06 ENCOUNTER — TELEPHONE (OUTPATIENT)
Dept: INTERNAL MEDICINE | Facility: CLINIC | Age: 74
End: 2019-03-06

## 2019-03-06 NOTE — TELEPHONE ENCOUNTER
Patient notified and expressed understanding.  Patient wanted to know if she was to still stop taking the spirolactone and only take 1 potassium daily.  Please advise.

## 2019-03-07 NOTE — TELEPHONE ENCOUNTER
She was to stop spironolactone and reduce her potassium on February 5 after last visit, if she has done this continue the same recheck in 3 months

## 2019-03-20 ENCOUNTER — PATIENT OUTREACH (OUTPATIENT)
Dept: CASE MANAGEMENT | Facility: OTHER | Age: 74
End: 2019-03-20

## 2019-03-20 NOTE — OUTREACH NOTE
Unable to reach x 4, HIPPA compliant voicemail left with Care Advisor contact information.  PCP notified of above info.

## 2019-03-27 ENCOUNTER — EPISODE CHANGES (OUTPATIENT)
Dept: CASE MANAGEMENT | Facility: OTHER | Age: 74
End: 2019-03-27

## 2019-06-03 ENCOUNTER — OFFICE VISIT (OUTPATIENT)
Dept: INTERNAL MEDICINE | Facility: CLINIC | Age: 74
End: 2019-06-03

## 2019-06-03 VITALS
HEART RATE: 67 BPM | BODY MASS INDEX: 22.31 KG/M2 | OXYGEN SATURATION: 98 % | WEIGHT: 133.9 LBS | TEMPERATURE: 97.7 F | SYSTOLIC BLOOD PRESSURE: 112 MMHG | DIASTOLIC BLOOD PRESSURE: 60 MMHG

## 2019-06-03 DIAGNOSIS — E78.2 MIXED HYPERLIPIDEMIA: ICD-10-CM

## 2019-06-03 DIAGNOSIS — N95.9 MENOPAUSAL AND POSTMENOPAUSAL DISORDER: ICD-10-CM

## 2019-06-03 DIAGNOSIS — Z00.00 MEDICARE ANNUAL WELLNESS VISIT, INITIAL: Primary | ICD-10-CM

## 2019-06-03 DIAGNOSIS — M85.80 OSTEOPENIA, UNSPECIFIED LOCATION: ICD-10-CM

## 2019-06-03 DIAGNOSIS — Z23 NEED FOR VACCINATION FOR PNEUMOCOCCUS: ICD-10-CM

## 2019-06-03 DIAGNOSIS — F41.9 ANXIETY: ICD-10-CM

## 2019-06-03 DIAGNOSIS — I10 ESSENTIAL HYPERTENSION: ICD-10-CM

## 2019-06-03 PROCEDURE — G0438 PPPS, INITIAL VISIT: HCPCS | Performed by: FAMILY MEDICINE

## 2019-06-03 PROCEDURE — 99214 OFFICE O/P EST MOD 30 MIN: CPT | Performed by: FAMILY MEDICINE

## 2019-06-03 PROCEDURE — G0009 ADMIN PNEUMOCOCCAL VACCINE: HCPCS | Performed by: FAMILY MEDICINE

## 2019-06-03 PROCEDURE — 90670 PCV13 VACCINE IM: CPT | Performed by: FAMILY MEDICINE

## 2019-06-03 NOTE — PROGRESS NOTES
Karly Mcgrath is a 73 y.o. female.      Assessment/Plan   Problem List Items Addressed This Visit        Cardiovascular and Mediastinum    Hyperlipidemia    Hypertension    Relevant Orders    Basic Metabolic Panel       Musculoskeletal and Integument    Osteopenia       Other    Anxiety    Medicare annual wellness visit, initial - Primary    Menopausal and postmenopausal disorder    Relevant Orders    DEXA Bone Density Axial      Other Visit Diagnoses     Need for vaccination for pneumococcus             Follow-up results of DEXA scan for management of osteopenia continue present treatment of hypertension hyperlipidemia and anxiety follow-up otherwise as needed or    Return in about 6 months (around 12/3/2019), or if symptoms worsen or fail to improve, for Recheck, Next scheduled follow up.      Chief Complaint   Patient presents with   • follow up to hypertension   • Medicare Wellness-Initial Visit     Social History     Tobacco Use   • Smoking status: Never Smoker   • Smokeless tobacco: Never Used   Substance Use Topics   • Alcohol use: No   • Drug use: No       History of Present Illness   Patient follows up for chronic problems of hypertension hyperlipidemia osteopenia anxiety well-controlled with present treatment plan labs have been recently been reviewed she has no other acute concerns and is doing well with BuSpar 1 tablet daily she is trying to watch her diet for lipid management as well as calorie restriction blood pressure is well controlled less than 140/90  The following portions of the patient's history were reviewed and updated as appropriate:PMHroutine: Social history , Allergies, Current Medications, Active Problem List and Health Maintenance    Review of Systems   Constitutional: Negative.    Respiratory: Negative.    Cardiovascular: Negative.    Gastrointestinal: Negative.    Genitourinary: Negative.    Musculoskeletal: Negative.    Neurological: Negative.    All other systems reviewed and are  negative.      Objective   Vitals:    06/03/19 0839   BP: 112/60   BP Location: Right arm   Patient Position: Sitting   Cuff Size: Adult   Pulse: 67   Temp: 97.7 °F (36.5 °C)   TempSrc: Oral   SpO2: 98%   Weight: 60.7 kg (133 lb 14.4 oz)     Body mass index is 22.31 kg/m².  Physical Exam   Constitutional: She is oriented to person, place, and time. She appears well-developed and well-nourished. No distress.   HENT:   Head: Normocephalic and atraumatic.   Right Ear: External ear normal.   Left Ear: External ear normal.   Mouth/Throat: Oropharynx is clear and moist.   Eyes: Conjunctivae and EOM are normal. Pupils are equal, round, and reactive to light. Right eye exhibits no discharge. Left eye exhibits no discharge. No scleral icterus.   Neck: Normal range of motion. Neck supple. No tracheal deviation present. No thyromegaly present.   Cardiovascular: Normal rate, regular rhythm, normal heart sounds, intact distal pulses and normal pulses. Exam reveals no gallop.   No murmur heard.  Pulmonary/Chest: Effort normal and breath sounds normal. No respiratory distress. She has no wheezes. She has no rales.   Musculoskeletal: Normal range of motion. She exhibits no edema.   Neurological: She is alert and oriented to person, place, and time. She exhibits normal muscle tone. Coordination normal.   Skin: Skin is warm. No rash noted. No erythema. No pallor.   Psychiatric: She has a normal mood and affect. Her behavior is normal. Judgment and thought content normal.   Nursing note and vitals reviewed.    Reviewed Data:  No visits with results within 1 Month(s) from this visit.   Latest known visit with results is:   Orders Only on 03/01/2019   Component Date Value Ref Range Status   • Glucose 03/05/2019 92  65 - 99 mg/dL Final   • BUN 03/05/2019 23  8 - 23 mg/dL Final   • Creatinine 03/05/2019 0.98  0.57 - 1.00 mg/dL Final   • eGFR Non African Am 03/05/2019 55* >60 mL/min/1.73 Final    Comment: The MDRD GFR formula is only valid  for adults with stable  renal function between ages 18 and 70.     • eGFR  Am 03/05/2019 67  >60 mL/min/1.73 Final   • BUN/Creatinine Ratio 03/05/2019 23.5  7.0 - 25.0 Final   • Sodium 03/05/2019 141  136 - 145 mmol/L Final   • Potassium 03/05/2019 3.9  3.5 - 5.2 mmol/L Final   • Chloride 03/05/2019 101  98 - 107 mmol/L Final   • Total CO2 03/05/2019 26.5  22.0 - 29.0 mmol/L Final   • Calcium 03/05/2019 9.7  8.6 - 10.5 mg/dL Final   • Total Protein 03/05/2019 6.6  6.0 - 8.5 g/dL Final   • Albumin 03/05/2019 4.20  3.50 - 5.20 g/dL Final   • Globulin 03/05/2019 2.4  gm/dL Final   • A/G Ratio 03/05/2019 1.8  g/dL Final   • Total Bilirubin 03/05/2019 0.7  0.1 - 1.2 mg/dL Final   • Alkaline Phosphatase 03/05/2019 74  39 - 117 U/L Final   • AST (SGOT) 03/05/2019 21  1 - 32 U/L Final   • ALT (SGPT) 03/05/2019 13  1 - 33 U/L Final

## 2019-06-03 NOTE — PROGRESS NOTES
QUICK REFERENCE INFORMATION:  The ABCs of the Annual Wellness Visit    Initial Medicare Wellness Visit    HEALTH RISK ASSESSMENT    : 1945    Recent Hospitalizations:  No hospitalization(s) within the last year..  ccc      Current Medical Providers:  Patient Care Team:  Vito Adame MD as PCP - General (Family Medicine)  Landry Hoffman MD as PCP - Family Medicine  Josué Espinosa MD as PCP - Claims Attributed        Smoking Status:  Social History     Tobacco Use   Smoking Status Never Smoker   Smokeless Tobacco Never Used       Alcohol Consumption:  Social History     Substance and Sexual Activity   Alcohol Use No       Depression Screen:   PHQ-2/PHQ-9 Depression Screening 6/3/2019   Little interest or pleasure in doing things 0   Feeling down, depressed, or hopeless 0   Trouble falling or staying asleep, or sleeping too much -   Feeling tired or having little energy -   Poor appetite or overeating -   Feeling bad about yourself - or that you are a failure or have let yourself or your family down -   Trouble concentrating on things, such as reading the newspaper or watching television -   Moving or speaking so slowly that other people could have noticed. Or the opposite - being so fidgety or restless that you have been moving around a lot more than usual -   Thoughts that you would be better off dead, or of hurting yourself in some way -   Total Score 0   If you checked off any problems, how difficult have these problems made it for you to do your work, take care of things at home, or get along with other people? -       Health Habits and Functional and Cognitive Screening:  Functional & Cognitive Status 6/3/2019   Do you have difficulty preparing food and eating? No   Do you have difficulty bathing yourself, getting dressed or grooming yourself? No   Do you have difficulty using the toilet? No   Do you have difficulty moving around from place to place? No   Do you have trouble with steps or  getting out of a bed or a chair? No   In the past year have you fallen or experienced a near fall? No   Current Diet Low Carb Diet   Dental Exam Up to date   Eye Exam Up to date   Exercise (times per week) 5 times per week   Current Exercise Activities Include Walking   Do you need help using the phone?  No   Are you deaf or do you have serious difficulty hearing?  Yes   Do you need help with transportation? No   Do you need help shopping? No   Do you need help preparing meals?  No   Do you need help with housework?  No   Do you need help with laundry? No   Do you need help taking your medications? No   Do you need help managing money? No   Do you ever drive or ride in a car without wearing a seat belt? No   Have you felt unusual stress, anger or loneliness in the last month? No   Who do you live with? Spouse   If you need help, do you have trouble finding someone available to you? No   Have you been bothered in the last four weeks by sexual problems? No   Do you have difficulty concentrating, remembering or making decisions? No           Does the patient have evidence of cognitive impairment? No    Asiprin use counseling: Does not need ASA (and currently is not on it)      Recent Lab Results:    Lab Results   Component Value Date    GLU 92 03/05/2019        Lab Results   Component Value Date    TRIG 56 11/19/2018    HDL 72 (H) 11/19/2018    VLDL 11.2 11/19/2018    LDLHDL 1.0 10/08/2015           Age-appropriate Screening Schedule:  Refer to the list below for future screening recommendations based on patient's age, sex and/or medical conditions. Orders for these recommended tests are listed in the plan section. The patient has been provided with a written plan.    Health Maintenance   Topic Date Due   • TDAP/TD VACCINES (1 - Tdap) 09/02/1963   • ZOSTER VACCINE (1 of 2) 09/02/1994   • PNEUMOCOCCAL VACCINES (65+ LOW/MEDIUM RISK) (1 of 2 - PCV13) 09/02/2009   • DXA SCAN  04/13/2017   • LIPID PANEL  11/19/2019   •  MAMMOGRAM  04/01/2021   • INFLUENZA VACCINE  Discontinued   • COLONOSCOPY  Discontinued        Subjective   History of Present Illness    Karly Mcgrath is a 73 y.o. female who presents for an Annual Wellness Visit.    The following portions of the patient's history were reviewed and updated as appropriate: allergies, current medications, past family history, past medical history, past social history, past surgical history and problem list.    Outpatient Medications Prior to Visit   Medication Sig Dispense Refill   • acyclovir (ZOVIRAX) 200 MG capsule Take 1 capsule by mouth 5 (Five) Times a Day. (Patient taking differently: Take 200 mg by mouth 5 (Five) Times a Day As Needed (cold sores).) 25 capsule 2   • atorvastatin (LIPITOR) 10 MG tablet TAKE 1 TABLET BY MOUTH DAILY 90 tablet 3   • busPIRone (BUSPAR) 10 MG tablet TAKE 1 TABLET BY MOUTH TWICE DAILY FOR ANXIETY 180 tablet 3   • Calcium Carb-Cholecalciferol (CALCIUM 600 + D) 600-200 MG-UNIT tablet Take 2,000 mg by mouth 2 (Two) Times a Day.     • cholecalciferol (VITAMIN D3) 1000 units tablet Take 2,000 Units by mouth Daily.     • levothyroxine (SYNTHROID, LEVOTHROID) 50 MCG tablet Take 1 tablet by mouth Daily. 90 tablet 3   • losartan (COZAAR) 50 MG tablet TAKE 1 TABLET BY MOUTH EVERY DAY 90 tablet 3   • metoprolol succinate XL (TOPROL-XL) 50 MG 24 hr tablet Take 1 tablet by mouth Daily. 90 tablet 3   • potassium chloride (K-DUR) 10 MEQ CR tablet Take 1 tablet by mouth Daily. 90 tablet 3   • amoxicillin-clavulanate (AUGMENTIN) 875-125 MG per tablet Take 1 tablet by mouth 2 (Two) Times a Day. 20 tablet 20     No facility-administered medications prior to visit.        Patient Active Problem List   Diagnosis   • Anxiety   • Bronchiectasis without complication (CMS/HCC)   • Hyperlipidemia   • Hypertension   • Osteopenia   • Hypothyroidism   • Health care maintenance   • Herpes simplex type 1 infection   • Urethritis   • Hypovitaminosis D   • Acute non-recurrent  maxillary sinusitis   • Medicare annual wellness visit, initial   • Menopausal and postmenopausal disorder       Advance Care Planning:  Patient does not have an advance directive - information provided to the patient today    Identification of Risk Factors:  Risk factors include: cardiovascular risk and hearing limitations.    Review of Systems    Compared to one year ago, the patient feels her physical health is the same.  Compared to one year ago, the patient feels her mental health is the same.    Objective     Physical Exam    Vitals:    06/03/19 0839   BP: 112/60   BP Location: Right arm   Patient Position: Sitting   Cuff Size: Adult   Pulse: 67   Temp: 97.7 °F (36.5 °C)   TempSrc: Oral   SpO2: 98%   Weight: 60.7 kg (133 lb 14.4 oz)   PainSc: 0-No pain       Patient's Body mass index is 22.31 kg/m². BMI is within normal parameters. No follow-up required..      Assessment/Plan   Patient Self-Management and Personalized Health Advice  The patient has been provided with information about: none and preventive services including:   · Bone densitometry screening.    Visit Diagnoses:    ICD-10-CM ICD-9-CM   1. Medicare annual wellness visit, initial Z00.00 V70.0   2. Essential hypertension I10 401.9   3. Osteopenia, unspecified location M85.80 733.90   4. Mixed hyperlipidemia E78.2 272.2   5. Anxiety F41.9 300.00   6. Menopausal and postmenopausal disorder N95.9 627.9   7. Need for vaccination for pneumococcus Z23 V03.82       Orders Placed This Encounter   Procedures   • DEXA Bone Density Axial     Standing Status:   Future     Standing Expiration Date:   6/3/2020     Order Specific Question:   Reason for Exam:     Answer:   osteopenia   • Pneumococcal Conjugate Vaccine 13-Valent All   • Basic Metabolic Panel     Stopped KCl 6-3-19     Standing Status:   Future     Standing Expiration Date:   6/3/2020       Outpatient Encounter Medications as of 6/3/2019   Medication Sig Dispense Refill   • acyclovir (ZOVIRAX) 200  MG capsule Take 1 capsule by mouth 5 (Five) Times a Day. (Patient taking differently: Take 200 mg by mouth 5 (Five) Times a Day As Needed (cold sores).) 25 capsule 2   • atorvastatin (LIPITOR) 10 MG tablet TAKE 1 TABLET BY MOUTH DAILY 90 tablet 3   • busPIRone (BUSPAR) 10 MG tablet TAKE 1 TABLET BY MOUTH TWICE DAILY FOR ANXIETY 180 tablet 3   • Calcium Carb-Cholecalciferol (CALCIUM 600 + D) 600-200 MG-UNIT tablet Take 2,000 mg by mouth 2 (Two) Times a Day.     • cholecalciferol (VITAMIN D3) 1000 units tablet Take 2,000 Units by mouth Daily.     • levothyroxine (SYNTHROID, LEVOTHROID) 50 MCG tablet Take 1 tablet by mouth Daily. 90 tablet 3   • losartan (COZAAR) 50 MG tablet TAKE 1 TABLET BY MOUTH EVERY DAY 90 tablet 3   • metoprolol succinate XL (TOPROL-XL) 50 MG 24 hr tablet Take 1 tablet by mouth Daily. 90 tablet 3   • [DISCONTINUED] potassium chloride (K-DUR) 10 MEQ CR tablet Take 1 tablet by mouth Daily. 90 tablet 3   • [DISCONTINUED] amoxicillin-clavulanate (AUGMENTIN) 875-125 MG per tablet Take 1 tablet by mouth 2 (Two) Times a Day. 20 tablet 20     No facility-administered encounter medications on file as of 6/3/2019.        Reviewed use of high risk medication in the elderly: yes  Reviewed for potential of harmful drug interactions in the elderly: yes    Follow Up:  Return in about 6 months (around 12/3/2019), or if symptoms worsen or fail to improve, for Recheck, Next scheduled follow up.     An After Visit Summary and PPPS with all of these plans were given to the patient.

## 2019-06-12 ENCOUNTER — HOSPITAL ENCOUNTER (OUTPATIENT)
Dept: BONE DENSITY | Facility: HOSPITAL | Age: 74
Discharge: HOME | End: 2019-06-12
Admitting: FAMILY MEDICINE

## 2019-06-12 DIAGNOSIS — N95.9 MENOPAUSAL AND POSTMENOPAUSAL DISORDER: ICD-10-CM

## 2019-06-12 PROCEDURE — 77080 DXA BONE DENSITY AXIAL: CPT

## 2019-06-17 ENCOUNTER — TELEPHONE (OUTPATIENT)
Dept: INTERNAL MEDICINE | Facility: CLINIC | Age: 74
End: 2019-06-17

## 2019-06-17 NOTE — TELEPHONE ENCOUNTER
Patient called stating that she thought that she was to have schedule an appointment for labs in 1 month from her last appointment and 6 months to see Dr. Adame.  Please advise if this is correct.

## 2019-06-18 NOTE — TELEPHONE ENCOUNTER
Patient notified and expressed understanding.  Patient stated that Dr. Adame took her off of her potassium and wanted to recheck labs in 1 month.  Lab appointment scheduled.

## 2019-07-02 LAB
BUN SERPL-MCNC: 26 MG/DL (ref 8–23)
BUN/CREAT SERPL: 30.2 (ref 7–25)
CALCIUM SERPL-MCNC: 9.2 MG/DL (ref 8.6–10.5)
CHLORIDE SERPL-SCNC: 100 MMOL/L (ref 98–107)
CO2 SERPL-SCNC: 25.7 MMOL/L (ref 22–29)
CREAT SERPL-MCNC: 0.86 MG/DL (ref 0.57–1)
GLUCOSE SERPL-MCNC: 93 MG/DL (ref 65–99)
POTASSIUM SERPL-SCNC: 4.6 MMOL/L (ref 3.5–5.2)
SODIUM SERPL-SCNC: 137 MMOL/L (ref 136–145)

## 2019-07-03 ENCOUNTER — RESULTS ENCOUNTER (OUTPATIENT)
Dept: INTERNAL MEDICINE | Facility: CLINIC | Age: 74
End: 2019-07-03

## 2019-07-03 DIAGNOSIS — I10 ESSENTIAL HYPERTENSION: ICD-10-CM

## 2019-08-28 ENCOUNTER — APPOINTMENT (OUTPATIENT)
Dept: GENERAL RADIOLOGY | Facility: HOSPITAL | Age: 74
End: 2019-08-28

## 2019-08-28 ENCOUNTER — HOSPITAL ENCOUNTER (EMERGENCY)
Facility: HOSPITAL | Age: 74
Discharge: HOME | End: 2019-08-29
Attending: EMERGENCY MEDICINE | Admitting: EMERGENCY MEDICINE

## 2019-08-28 DIAGNOSIS — J18.9 COMMUNITY ACQUIRED PNEUMONIA, UNSPECIFIED LATERALITY: Primary | ICD-10-CM

## 2019-08-28 LAB
ALBUMIN SERPL-MCNC: 4.1 G/DL (ref 3.5–5.2)
ALBUMIN/GLOB SERPL: 1.5 G/DL
ALP SERPL-CCNC: 72 U/L (ref 39–117)
ALT SERPL W P-5'-P-CCNC: 10 U/L (ref 1–33)
ANION GAP SERPL CALCULATED.3IONS-SCNC: 12.9 MMOL/L (ref 5–15)
AST SERPL-CCNC: 19 U/L (ref 1–32)
BACTERIA UR QL AUTO: ABNORMAL /HPF
BASOPHILS # BLD AUTO: 0.02 10*3/MM3 (ref 0–0.2)
BASOPHILS NFR BLD AUTO: 0.2 % (ref 0–1.5)
BILIRUB SERPL-MCNC: 0.6 MG/DL (ref 0.2–1.2)
BILIRUB UR QL STRIP: NEGATIVE
BUN BLD-MCNC: 23 MG/DL (ref 8–23)
BUN/CREAT SERPL: 27.4 (ref 7–25)
CALCIUM SPEC-SCNC: 9.5 MG/DL (ref 8.6–10.5)
CHLORIDE SERPL-SCNC: 94 MMOL/L (ref 98–107)
CLARITY UR: CLEAR
CO2 SERPL-SCNC: 23.1 MMOL/L (ref 22–29)
COLOR UR: YELLOW
CREAT BLD-MCNC: 0.84 MG/DL (ref 0.57–1)
D-LACTATE SERPL-SCNC: 0.9 MMOL/L (ref 0.5–2)
DEPRECATED RDW RBC AUTO: 43.6 FL (ref 37–54)
EOSINOPHIL # BLD AUTO: 0.02 10*3/MM3 (ref 0–0.4)
EOSINOPHIL NFR BLD AUTO: 0.2 % (ref 0.3–6.2)
ERYTHROCYTE [DISTWIDTH] IN BLOOD BY AUTOMATED COUNT: 14.4 % (ref 12.3–15.4)
GFR SERPL CREATININE-BSD FRML MDRD: 66 ML/MIN/1.73
GLOBULIN UR ELPH-MCNC: 2.7 GM/DL
GLUCOSE BLD-MCNC: 129 MG/DL (ref 65–99)
GLUCOSE UR STRIP-MCNC: NEGATIVE MG/DL
HCT VFR BLD AUTO: 34.6 % (ref 34–46.6)
HGB BLD-MCNC: 11.3 G/DL (ref 12–15.9)
HGB UR QL STRIP.AUTO: ABNORMAL
HYALINE CASTS UR QL AUTO: ABNORMAL /LPF
IMM GRANULOCYTES # BLD AUTO: 0.06 10*3/MM3 (ref 0–0.05)
IMM GRANULOCYTES NFR BLD AUTO: 0.6 % (ref 0–0.5)
KETONES UR QL STRIP: NEGATIVE
LEUKOCYTE ESTERASE UR QL STRIP.AUTO: ABNORMAL
LIPASE SERPL-CCNC: 35 U/L (ref 13–60)
LYMPHOCYTES # BLD AUTO: 0.82 10*3/MM3 (ref 0.7–3.1)
LYMPHOCYTES NFR BLD AUTO: 8 % (ref 19.6–45.3)
MCH RBC QN AUTO: 27.2 PG (ref 26.6–33)
MCHC RBC AUTO-ENTMCNC: 32.7 G/DL (ref 31.5–35.7)
MCV RBC AUTO: 83.2 FL (ref 79–97)
MONOCYTES # BLD AUTO: 1.1 10*3/MM3 (ref 0.1–0.9)
MONOCYTES NFR BLD AUTO: 10.7 % (ref 5–12)
NEUTROPHILS # BLD AUTO: 8.27 10*3/MM3 (ref 1.7–7)
NEUTROPHILS NFR BLD AUTO: 80.3 % (ref 42.7–76)
NITRITE UR QL STRIP: NEGATIVE
NRBC BLD AUTO-RTO: 0 /100 WBC (ref 0–0.2)
PH UR STRIP.AUTO: <=5 [PH] (ref 5–8)
PLATELET # BLD AUTO: 119 10*3/MM3 (ref 140–450)
PMV BLD AUTO: 12.7 FL (ref 6–12)
POTASSIUM BLD-SCNC: 4.1 MMOL/L (ref 3.5–5.2)
PROCALCITONIN SERPL-MCNC: 0.06 NG/ML (ref 0.1–0.25)
PROT SERPL-MCNC: 6.8 G/DL (ref 6–8.5)
PROT UR QL STRIP: NEGATIVE
RBC # BLD AUTO: 4.16 10*6/MM3 (ref 3.77–5.28)
RBC # UR: ABNORMAL /HPF
REF LAB TEST METHOD: ABNORMAL
SODIUM BLD-SCNC: 130 MMOL/L (ref 136–145)
SP GR UR STRIP: 1.02 (ref 1–1.03)
SQUAMOUS #/AREA URNS HPF: ABNORMAL /HPF
UROBILINOGEN UR QL STRIP: ABNORMAL
WBC NRBC COR # BLD: 10.29 10*3/MM3 (ref 3.4–10.8)
WBC UR QL AUTO: ABNORMAL /HPF

## 2019-08-28 PROCEDURE — 85025 COMPLETE CBC W/AUTO DIFF WBC: CPT | Performed by: PHYSICIAN ASSISTANT

## 2019-08-28 PROCEDURE — 87040 BLOOD CULTURE FOR BACTERIA: CPT | Performed by: PHYSICIAN ASSISTANT

## 2019-08-28 PROCEDURE — 84145 PROCALCITONIN (PCT): CPT | Performed by: PHYSICIAN ASSISTANT

## 2019-08-28 PROCEDURE — 81001 URINALYSIS AUTO W/SCOPE: CPT

## 2019-08-28 PROCEDURE — 83690 ASSAY OF LIPASE: CPT | Performed by: PHYSICIAN ASSISTANT

## 2019-08-28 PROCEDURE — 71045 X-RAY EXAM CHEST 1 VIEW: CPT

## 2019-08-28 PROCEDURE — 83605 ASSAY OF LACTIC ACID: CPT | Performed by: PHYSICIAN ASSISTANT

## 2019-08-28 PROCEDURE — 80053 COMPREHEN METABOLIC PANEL: CPT | Performed by: PHYSICIAN ASSISTANT

## 2019-08-28 PROCEDURE — 99284 EMERGENCY DEPT VISIT MOD MDM: CPT

## 2019-08-28 RX ORDER — DOXYCYCLINE 100 MG/1
100 CAPSULE ORAL 2 TIMES DAILY
Qty: 14 CAPSULE | Refills: 0 | Status: SHIPPED | OUTPATIENT
Start: 2019-08-28 | End: 2019-09-09

## 2019-08-28 RX ORDER — ACETAMINOPHEN 500 MG
1000 TABLET ORAL ONCE
Status: COMPLETED | OUTPATIENT
Start: 2019-08-28 | End: 2019-08-28

## 2019-08-28 RX ORDER — SODIUM CHLORIDE 0.9 % (FLUSH) 0.9 %
10 SYRINGE (ML) INJECTION AS NEEDED
Status: DISCONTINUED | OUTPATIENT
Start: 2019-08-28 | End: 2019-08-29 | Stop reason: HOSPADM

## 2019-08-28 RX ADMIN — ACETAMINOPHEN 1000 MG: 500 TABLET ORAL at 21:15

## 2019-08-28 RX ADMIN — SODIUM CHLORIDE 1000 ML: 9 INJECTION, SOLUTION INTRAVENOUS at 23:01

## 2019-08-29 VITALS
RESPIRATION RATE: 16 BRPM | HEIGHT: 66 IN | TEMPERATURE: 98.4 F | SYSTOLIC BLOOD PRESSURE: 149 MMHG | OXYGEN SATURATION: 96 % | BODY MASS INDEX: 21.61 KG/M2 | HEART RATE: 73 BPM | DIASTOLIC BLOOD PRESSURE: 77 MMHG

## 2019-09-02 LAB
BACTERIA SPEC AEROBE CULT: NORMAL
BACTERIA SPEC AEROBE CULT: NORMAL

## 2019-09-09 ENCOUNTER — OFFICE VISIT (OUTPATIENT)
Dept: INTERNAL MEDICINE | Facility: CLINIC | Age: 74
End: 2019-09-09

## 2019-09-09 VITALS
WEIGHT: 136.7 LBS | OXYGEN SATURATION: 98 % | HEART RATE: 63 BPM | BODY MASS INDEX: 22.06 KG/M2 | TEMPERATURE: 98.1 F | SYSTOLIC BLOOD PRESSURE: 110 MMHG | DIASTOLIC BLOOD PRESSURE: 66 MMHG

## 2019-09-09 DIAGNOSIS — J18.9 PNEUMONIA OF BOTH LUNGS DUE TO INFECTIOUS ORGANISM, UNSPECIFIED PART OF LUNG: Primary | ICD-10-CM

## 2019-09-09 PROCEDURE — 99213 OFFICE O/P EST LOW 20 MIN: CPT | Performed by: FAMILY MEDICINE

## 2019-09-23 ENCOUNTER — HOSPITAL ENCOUNTER (OUTPATIENT)
Dept: GENERAL RADIOLOGY | Facility: HOSPITAL | Age: 74
Discharge: HOME | End: 2019-09-23
Admitting: FAMILY MEDICINE

## 2019-09-23 DIAGNOSIS — J18.9 PNEUMONIA OF BOTH LUNGS DUE TO INFECTIOUS ORGANISM, UNSPECIFIED PART OF LUNG: ICD-10-CM

## 2019-09-23 PROCEDURE — 71046 X-RAY EXAM CHEST 2 VIEWS: CPT

## 2019-11-07 ENCOUNTER — OFFICE VISIT (OUTPATIENT)
Dept: INTERNAL MEDICINE | Facility: CLINIC | Age: 74
End: 2019-11-07

## 2019-11-07 VITALS
HEART RATE: 72 BPM | OXYGEN SATURATION: 97 % | BODY MASS INDEX: 21.55 KG/M2 | WEIGHT: 134.1 LBS | TEMPERATURE: 97.8 F | DIASTOLIC BLOOD PRESSURE: 70 MMHG | SYSTOLIC BLOOD PRESSURE: 144 MMHG | HEIGHT: 66 IN

## 2019-11-07 DIAGNOSIS — J06.9 ACUTE URI: Primary | ICD-10-CM

## 2019-11-07 PROCEDURE — 99213 OFFICE O/P EST LOW 20 MIN: CPT | Performed by: FAMILY MEDICINE

## 2019-11-07 NOTE — PROGRESS NOTES
Karly Mcgrath is a 74 y.o. female.      Assessment/Plan   Problem List Items Addressed This Visit     None      Visit Diagnoses     Acute URI    -  Primary           Recommend Flonase and Robitussin-DM for URI symptoms follow-up  Return if symptoms worsen or fail to improve, for Recheck, Next scheduled follow up.      Chief Complaint   Patient presents with   • Cough     started last Thursday   • sinus drainage   • sinus congestion   • Hoarse     Social History     Tobacco Use   • Smoking status: Never Smoker   • Smokeless tobacco: Never Used   Substance Use Topics   • Alcohol use: No   • Drug use: No       History of Present Illness   Patient point for head congestion hoarseness productive cough over the last week no fever sweats or chills no sore throat no treatments tried she just is sick of having persistent symptoms  The following portions of the patient's history were reviewed and updated as appropriate:PMHroutine: Social history , Allergies, Current Medications, Active Problem List and Health Maintenance    Review of Systems   Constitutional: Positive for fatigue. Negative for activity change and unexpected weight change.   HENT: Positive for congestion, postnasal drip and sore throat. Negative for ear pain.    Eyes: Negative for pain and discharge.   Respiratory: Positive for cough. Negative for chest tightness, shortness of breath and wheezing.    Cardiovascular: Negative for chest pain and palpitations.   Gastrointestinal: Negative for abdominal pain, diarrhea and vomiting.   Endocrine: Negative.    Genitourinary: Negative.    Musculoskeletal: Negative for joint swelling.   Skin: Negative for color change, rash and wound.   Allergic/Immunologic: Negative.    Neurological: Negative for seizures and syncope.   Psychiatric/Behavioral: Negative.        Objective   Vitals:    11/07/19 1045   BP: 144/70   BP Location: Right arm   Patient Position: Sitting   Cuff Size: Adult   Pulse: 72   Temp: 97.8 °F (36.6 °C)  "  TempSrc: Oral   SpO2: 97%   Weight: 60.8 kg (134 lb 1.6 oz)   Height: 167.6 cm (66\")     Body mass index is 21.64 kg/m².  Physical Exam   Constitutional: She is oriented to person, place, and time. She appears well-developed and well-nourished.  Non-toxic appearance. No distress.   HENT:   Head: Normocephalic and atraumatic. Hair is normal.   Right Ear: External ear normal. No drainage, swelling or tenderness. Tympanic membrane is retracted.   Left Ear: External ear normal. No drainage, swelling or tenderness. Tympanic membrane is retracted.   Nose: Mucosal edema present. No epistaxis.   Mouth/Throat: Uvula is midline and mucous membranes are normal. No oral lesions. No uvula swelling. Posterior oropharyngeal erythema present. No oropharyngeal exudate.   Eyes: Conjunctivae and EOM are normal. Pupils are equal, round, and reactive to light. Right eye exhibits no discharge. Left eye exhibits no discharge. No scleral icterus.   Neck: Normal range of motion. Neck supple.   Cardiovascular: Normal rate, regular rhythm and normal heart sounds. Exam reveals no gallop.   No murmur heard.  Pulmonary/Chest: Breath sounds normal. No stridor. No respiratory distress. She has no wheezes. She has no rales. She exhibits no tenderness.   Abdominal: Soft. There is no tenderness.   Lymphadenopathy:     She has cervical adenopathy.   Neurological: She is alert and oriented to person, place, and time. She exhibits normal muscle tone.   Skin: Skin is warm and dry. No rash noted. She is not diaphoretic.   Psychiatric: She has a normal mood and affect. Her behavior is normal. Judgment and thought content normal.   Nursing note and vitals reviewed.    Reviewed Data:  No visits with results within 1 Month(s) from this visit.   Latest known visit with results is:   Admission on 08/28/2019, Discharged on 08/29/2019   Component Date Value Ref Range Status   • Color,  08/28/2019 Yellow  Yellow, Straw Final   • Appearance,  08/28/2019 Clear "  Clear Final   • pH, UA 08/28/2019 <=5.0  5.0 - 8.0 Final   • Specific Gravity, UA 08/28/2019 1.018  1.005 - 1.030 Final   • Glucose, UA 08/28/2019 Negative  Negative Final   • Ketones, UA 08/28/2019 Negative  Negative Final   • Bilirubin, UA 08/28/2019 Negative  Negative Final   • Blood, UA 08/28/2019 Small (1+)* Negative Final   • Protein, UA 08/28/2019 Negative  Negative Final   • Leuk Esterase, UA 08/28/2019 Trace* Negative Final   • Nitrite, UA 08/28/2019 Negative  Negative Final   • Urobilinogen, UA 08/28/2019 0.2 E.U./dL  0.2 - 1.0 E.U./dL Final   • RBC, UA 08/28/2019 0-2  None Seen, 0-2 /HPF Final   • WBC, UA 08/28/2019 6-12* None Seen, 0-2 /HPF Final   • Bacteria, UA 08/28/2019 None Seen  None Seen /HPF Final   • Squamous Epithelial Cells, UA 08/28/2019 3-6* None Seen, 0-2 /HPF Final   • Hyaline Casts, UA 08/28/2019 0-2  None Seen /LPF Final   • Methodology 08/28/2019 Automated Microscopy   Final   • Glucose 08/28/2019 129* 65 - 99 mg/dL Final   • BUN 08/28/2019 23  8 - 23 mg/dL Final   • Creatinine 08/28/2019 0.84  0.57 - 1.00 mg/dL Final   • Sodium 08/28/2019 130* 136 - 145 mmol/L Final   • Potassium 08/28/2019 4.1  3.5 - 5.2 mmol/L Final   • Chloride 08/28/2019 94* 98 - 107 mmol/L Final   • CO2 08/28/2019 23.1  22.0 - 29.0 mmol/L Final   • Calcium 08/28/2019 9.5  8.6 - 10.5 mg/dL Final   • Total Protein 08/28/2019 6.8  6.0 - 8.5 g/dL Final   • Albumin 08/28/2019 4.10  3.50 - 5.20 g/dL Final   • ALT (SGPT) 08/28/2019 10  1 - 33 U/L Final   • AST (SGOT) 08/28/2019 19  1 - 32 U/L Final   • Alkaline Phosphatase 08/28/2019 72  39 - 117 U/L Final   • Total Bilirubin 08/28/2019 0.6  0.2 - 1.2 mg/dL Final   • eGFR Non  Amer 08/28/2019 66  >60 mL/min/1.73 Final   • Globulin 08/28/2019 2.7  gm/dL Final   • A/G Ratio 08/28/2019 1.5  g/dL Final   • BUN/Creatinine Ratio 08/28/2019 27.4* 7.0 - 25.0 Final   • Anion Gap 08/28/2019 12.9  5.0 - 15.0 mmol/L Final   • Blood Culture 08/28/2019 No growth at 5 days    Final   • Blood Culture 08/28/2019 No growth at 5 days   Final   • Procalcitonin 08/28/2019 0.06* 0.10 - 0.25 ng/mL Final   • Lactate 08/28/2019 0.9  0.5 - 2.0 mmol/L Final   • Lipase 08/28/2019 35  13 - 60 U/L Final   • WBC 08/28/2019 10.29  3.40 - 10.80 10*3/mm3 Final   • RBC 08/28/2019 4.16  3.77 - 5.28 10*6/mm3 Final   • Hemoglobin 08/28/2019 11.3* 12.0 - 15.9 g/dL Final   • Hematocrit 08/28/2019 34.6  34.0 - 46.6 % Final   • MCV 08/28/2019 83.2  79.0 - 97.0 fL Final   • MCH 08/28/2019 27.2  26.6 - 33.0 pg Final   • MCHC 08/28/2019 32.7  31.5 - 35.7 g/dL Final   • RDW 08/28/2019 14.4  12.3 - 15.4 % Final   • RDW-SD 08/28/2019 43.6  37.0 - 54.0 fl Final   • MPV 08/28/2019 12.7* 6.0 - 12.0 fL Final   • Platelets 08/28/2019 119* 140 - 450 10*3/mm3 Final   • Neutrophil % 08/28/2019 80.3* 42.7 - 76.0 % Final   • Lymphocyte % 08/28/2019 8.0* 19.6 - 45.3 % Final   • Monocyte % 08/28/2019 10.7  5.0 - 12.0 % Final   • Eosinophil % 08/28/2019 0.2* 0.3 - 6.2 % Final   • Basophil % 08/28/2019 0.2  0.0 - 1.5 % Final   • Immature Grans % 08/28/2019 0.6* 0.0 - 0.5 % Final   • Neutrophils, Absolute 08/28/2019 8.27* 1.70 - 7.00 10*3/mm3 Final   • Lymphocytes, Absolute 08/28/2019 0.82  0.70 - 3.10 10*3/mm3 Final   • Monocytes, Absolute 08/28/2019 1.10* 0.10 - 0.90 10*3/mm3 Final   • Eosinophils, Absolute 08/28/2019 0.02  0.00 - 0.40 10*3/mm3 Final   • Basophils, Absolute 08/28/2019 0.02  0.00 - 0.20 10*3/mm3 Final   • Immature Grans, Absolute 08/28/2019 0.06* 0.00 - 0.05 10*3/mm3 Final   • nRBC 08/28/2019 0.0  0.0 - 0.2 /100 WBC Final

## 2019-12-03 ENCOUNTER — TELEPHONE (OUTPATIENT)
Dept: INTERNAL MEDICINE | Facility: CLINIC | Age: 74
End: 2019-12-03

## 2019-12-03 NOTE — TELEPHONE ENCOUNTER
Rick ([phone]) sent a refill request for losartan 50 mg #90 last refill date 9/1/19.  Please advise.

## 2019-12-04 RX ORDER — LOSARTAN POTASSIUM 50 MG/1
50 TABLET ORAL DAILY
Qty: 90 TABLET | Refills: 2 | Status: SHIPPED | OUTPATIENT
Start: 2019-12-04 | End: 2020-09-14

## 2019-12-11 ENCOUNTER — APPOINTMENT (OUTPATIENT)
Dept: LAB | Facility: HOSPITAL | Age: 74
End: 2019-12-11

## 2019-12-11 ENCOUNTER — OFFICE VISIT (OUTPATIENT)
Dept: INTERNAL MEDICINE | Facility: CLINIC | Age: 74
End: 2019-12-11

## 2019-12-11 VITALS
HEIGHT: 66 IN | HEART RATE: 67 BPM | SYSTOLIC BLOOD PRESSURE: 122 MMHG | BODY MASS INDEX: 21.45 KG/M2 | DIASTOLIC BLOOD PRESSURE: 64 MMHG | WEIGHT: 133.5 LBS | OXYGEN SATURATION: 98 %

## 2019-12-11 DIAGNOSIS — E78.2 MIXED HYPERLIPIDEMIA: Primary | ICD-10-CM

## 2019-12-11 DIAGNOSIS — Z78.9 VEGETARIAN: ICD-10-CM

## 2019-12-11 DIAGNOSIS — I10 ESSENTIAL HYPERTENSION: ICD-10-CM

## 2019-12-11 DIAGNOSIS — E03.9 ACQUIRED HYPOTHYROIDISM: ICD-10-CM

## 2019-12-11 DIAGNOSIS — J47.9 BRONCHIECTASIS WITHOUT COMPLICATION (HCC): ICD-10-CM

## 2019-12-11 DIAGNOSIS — E55.9 HYPOVITAMINOSIS D: ICD-10-CM

## 2019-12-11 PROBLEM — J01.00 ACUTE NON-RECURRENT MAXILLARY SINUSITIS: Status: RESOLVED | Noted: 2019-02-22 | Resolved: 2019-12-11

## 2019-12-11 PROBLEM — J18.9 PNEUMONIA: Status: RESOLVED | Noted: 2019-09-09 | Resolved: 2019-12-11

## 2019-12-11 LAB
25(OH)D3 SERPL-MCNC: 61.2 NG/ML (ref 30–100)
CHOLEST SERPL-MCNC: 150 MG/DL (ref 0–200)
HDLC SERPL-MCNC: 55 MG/DL (ref 40–60)
LDLC SERPL CALC-MCNC: 79 MG/DL (ref 0–100)
LDLC/HDLC SERPL: 1.44 {RATIO}
TRIGL SERPL-MCNC: 79 MG/DL (ref 0–150)
TSH SERPL DL<=0.05 MIU/L-ACNC: 4.77 UIU/ML (ref 0.27–4.2)
VLDLC SERPL-MCNC: 15.8 MG/DL (ref 5–40)

## 2019-12-11 PROCEDURE — 36415 COLL VENOUS BLD VENIPUNCTURE: CPT | Performed by: FAMILY MEDICINE

## 2019-12-11 PROCEDURE — 84443 ASSAY THYROID STIM HORMONE: CPT | Performed by: FAMILY MEDICINE

## 2019-12-11 PROCEDURE — 99214 OFFICE O/P EST MOD 30 MIN: CPT | Performed by: FAMILY MEDICINE

## 2019-12-11 PROCEDURE — 82306 VITAMIN D 25 HYDROXY: CPT | Performed by: FAMILY MEDICINE

## 2019-12-11 PROCEDURE — 83921 ORGANIC ACID SINGLE QUANT: CPT | Performed by: FAMILY MEDICINE

## 2019-12-11 PROCEDURE — 80061 LIPID PANEL: CPT | Performed by: FAMILY MEDICINE

## 2019-12-11 NOTE — PROGRESS NOTES
Karly Mcgrath is a 74 y.o. female.      Assessment/Plan   Problem List Items Addressed This Visit        Cardiovascular and Mediastinum    Hyperlipidemia - Primary    Relevant Orders    Lipid Panel    Hypertension       Respiratory    Bronchiectasis without complication (CMS/HCC)    Overview     Followed by pulmonolgy            Digestive    Hypovitaminosis D    Relevant Orders    Vitamin D 25 Hydroxy       Endocrine    Hypothyroidism    Relevant Orders    TSH       Other    Vegetarian    Relevant Orders    Methylmalonic Acid, Serum           Follow-up results of blood work for ongoing management of chronic problems as well as diagnosis of any cyanocobalamin deficiency recommend daily multivitamin follow-up otherwise as needed or  Return in about 6 months (around 6/11/2020), or if symptoms worsen or fail to improve, for Recheck, Next scheduled follow up.      Chief Complaint   Patient presents with   • Follow-up     6 month f/u, no complaints   Hyperlipidemia hypertension bronchiectasis vitamin D deficiency hypothyroidism vegetarian diet  Social History     Tobacco Use   • Smoking status: Never Smoker   • Smokeless tobacco: Never Used   Substance Use Topics   • Alcohol use: No   • Drug use: No       History of Present Illness   Patient follows up for chronic problems hyperlipidemia hypertension vitamin D deficiency hypothyroidism is been doing fairly well no acute concerns her cough is much improved with maintenance therapy for her lungs she is following a vegan diet and wondered if she is A vitamin B12 deficient she does not take any specific vitamin supplements  The following portions of the patient's history were reviewed and updated as appropriate:PMHroutine: Social history , Allergies, Current Medications, Active Problem List and Health Maintenance    Review of Systems   Constitutional: Negative for activity change, appetite change and unexpected weight change.   HENT: Negative for nosebleeds and trouble  "swallowing.    Eyes: Negative for pain and visual disturbance.   Respiratory: Negative for chest tightness, shortness of breath and wheezing.    Cardiovascular: Negative for chest pain and palpitations.   Gastrointestinal: Negative for abdominal pain and blood in stool.   Endocrine: Negative.    Genitourinary: Negative for difficulty urinating and hematuria.   Musculoskeletal: Negative for joint swelling.   Skin: Negative for color change and rash.   Allergic/Immunologic: Negative.    Neurological: Negative for syncope and speech difficulty.   Hematological: Negative for adenopathy.   Psychiatric/Behavioral: Negative for agitation and confusion.   All other systems reviewed and are negative.      Objective   Vitals:    12/11/19 0930   BP: 122/64   BP Location: Right arm   Pulse: 67   SpO2: 98%   Weight: 60.6 kg (133 lb 8 oz)   Height: 167.6 cm (65.98\")     Body mass index is 21.56 kg/m².  Physical Exam   Constitutional: She is oriented to person, place, and time. She appears well-developed and well-nourished. No distress.   HENT:   Head: Normocephalic and atraumatic.   Right Ear: External ear normal.   Left Ear: External ear normal.   Mouth/Throat: Oropharynx is clear and moist.   Eyes: Pupils are equal, round, and reactive to light. Conjunctivae and EOM are normal. Right eye exhibits no discharge. Left eye exhibits no discharge. No scleral icterus.   Neck: Normal range of motion. Neck supple. No tracheal deviation present. No thyromegaly present.   Cardiovascular: Normal rate, regular rhythm, normal heart sounds, intact distal pulses and normal pulses. Exam reveals no gallop.   No murmur heard.  Pulmonary/Chest: Effort normal and breath sounds normal. No respiratory distress. She has no wheezes. She has no rales.   Abdominal: Soft. She exhibits no distension. There is no tenderness.   Musculoskeletal: Normal range of motion. She exhibits no edema.   Neurological: She is alert and oriented to person, place, and " time. She exhibits normal muscle tone. Coordination normal.   Skin: Skin is warm. No rash noted. No erythema. No pallor.   Psychiatric: She has a normal mood and affect. Her behavior is normal. Judgment and thought content normal.   Nursing note and vitals reviewed.    Reviewed Data:  No visits with results within 1 Month(s) from this visit.   Latest known visit with results is:   Admission on 08/28/2019, Discharged on 08/29/2019   Component Date Value Ref Range Status   • Color, UA 08/28/2019 Yellow  Yellow, Straw Final   • Appearance, UA 08/28/2019 Clear  Clear Final   • pH, UA 08/28/2019 <=5.0  5.0 - 8.0 Final   • Specific Gravity, UA 08/28/2019 1.018  1.005 - 1.030 Final   • Glucose, UA 08/28/2019 Negative  Negative Final   • Ketones, UA 08/28/2019 Negative  Negative Final   • Bilirubin, UA 08/28/2019 Negative  Negative Final   • Blood, UA 08/28/2019 Small (1+)* Negative Final   • Protein, UA 08/28/2019 Negative  Negative Final   • Leuk Esterase, UA 08/28/2019 Trace* Negative Final   • Nitrite, UA 08/28/2019 Negative  Negative Final   • Urobilinogen, UA 08/28/2019 0.2 E.U./dL  0.2 - 1.0 E.U./dL Final   • RBC, UA 08/28/2019 0-2  None Seen, 0-2 /HPF Final   • WBC, UA 08/28/2019 6-12* None Seen, 0-2 /HPF Final   • Bacteria, UA 08/28/2019 None Seen  None Seen /HPF Final   • Squamous Epithelial Cells, UA 08/28/2019 3-6* None Seen, 0-2 /HPF Final   • Hyaline Casts, UA 08/28/2019 0-2  None Seen /LPF Final   • Methodology 08/28/2019 Automated Microscopy   Final   • Glucose 08/28/2019 129* 65 - 99 mg/dL Final   • BUN 08/28/2019 23  8 - 23 mg/dL Final   • Creatinine 08/28/2019 0.84  0.57 - 1.00 mg/dL Final   • Sodium 08/28/2019 130* 136 - 145 mmol/L Final   • Potassium 08/28/2019 4.1  3.5 - 5.2 mmol/L Final   • Chloride 08/28/2019 94* 98 - 107 mmol/L Final   • CO2 08/28/2019 23.1  22.0 - 29.0 mmol/L Final   • Calcium 08/28/2019 9.5  8.6 - 10.5 mg/dL Final   • Total Protein 08/28/2019 6.8  6.0 - 8.5 g/dL Final   • Albumin  08/28/2019 4.10  3.50 - 5.20 g/dL Final   • ALT (SGPT) 08/28/2019 10  1 - 33 U/L Final   • AST (SGOT) 08/28/2019 19  1 - 32 U/L Final   • Alkaline Phosphatase 08/28/2019 72  39 - 117 U/L Final   • Total Bilirubin 08/28/2019 0.6  0.2 - 1.2 mg/dL Final   • eGFR Non  Amer 08/28/2019 66  >60 mL/min/1.73 Final   • Globulin 08/28/2019 2.7  gm/dL Final   • A/G Ratio 08/28/2019 1.5  g/dL Final   • BUN/Creatinine Ratio 08/28/2019 27.4* 7.0 - 25.0 Final   • Anion Gap 08/28/2019 12.9  5.0 - 15.0 mmol/L Final   • Blood Culture 08/28/2019 No growth at 5 days   Final   • Blood Culture 08/28/2019 No growth at 5 days   Final   • Procalcitonin 08/28/2019 0.06* 0.10 - 0.25 ng/mL Final   • Lactate 08/28/2019 0.9  0.5 - 2.0 mmol/L Final   • Lipase 08/28/2019 35  13 - 60 U/L Final   • WBC 08/28/2019 10.29  3.40 - 10.80 10*3/mm3 Final   • RBC 08/28/2019 4.16  3.77 - 5.28 10*6/mm3 Final   • Hemoglobin 08/28/2019 11.3* 12.0 - 15.9 g/dL Final   • Hematocrit 08/28/2019 34.6  34.0 - 46.6 % Final   • MCV 08/28/2019 83.2  79.0 - 97.0 fL Final   • MCH 08/28/2019 27.2  26.6 - 33.0 pg Final   • MCHC 08/28/2019 32.7  31.5 - 35.7 g/dL Final   • RDW 08/28/2019 14.4  12.3 - 15.4 % Final   • RDW-SD 08/28/2019 43.6  37.0 - 54.0 fl Final   • MPV 08/28/2019 12.7* 6.0 - 12.0 fL Final   • Platelets 08/28/2019 119* 140 - 450 10*3/mm3 Final   • Neutrophil % 08/28/2019 80.3* 42.7 - 76.0 % Final   • Lymphocyte % 08/28/2019 8.0* 19.6 - 45.3 % Final   • Monocyte % 08/28/2019 10.7  5.0 - 12.0 % Final   • Eosinophil % 08/28/2019 0.2* 0.3 - 6.2 % Final   • Basophil % 08/28/2019 0.2  0.0 - 1.5 % Final   • Immature Grans % 08/28/2019 0.6* 0.0 - 0.5 % Final   • Neutrophils, Absolute 08/28/2019 8.27* 1.70 - 7.00 10*3/mm3 Final   • Lymphocytes, Absolute 08/28/2019 0.82  0.70 - 3.10 10*3/mm3 Final   • Monocytes, Absolute 08/28/2019 1.10* 0.10 - 0.90 10*3/mm3 Final   • Eosinophils, Absolute 08/28/2019 0.02  0.00 - 0.40 10*3/mm3 Final   • Basophils, Absolute  08/28/2019 0.02  0.00 - 0.20 10*3/mm3 Final   • Immature Grans, Absolute 08/28/2019 0.06* 0.00 - 0.05 10*3/mm3 Final   • nRBC 08/28/2019 0.0  0.0 - 0.2 /100 WBC Final

## 2019-12-12 DIAGNOSIS — E03.9 HYPOTHYROIDISM, UNSPECIFIED TYPE: Primary | ICD-10-CM

## 2019-12-12 RX ORDER — LEVOTHYROXINE SODIUM 0.07 MG/1
75 TABLET ORAL DAILY
Qty: 90 TABLET | Refills: 1 | Status: SHIPPED | OUTPATIENT
Start: 2019-12-12 | End: 2020-06-04

## 2019-12-17 ENCOUNTER — RESULTS ENCOUNTER (OUTPATIENT)
Dept: INTERNAL MEDICINE | Facility: CLINIC | Age: 74
End: 2019-12-17

## 2019-12-17 DIAGNOSIS — E03.9 HYPOTHYROIDISM, UNSPECIFIED TYPE: ICD-10-CM

## 2019-12-18 LAB
Lab: ABNORMAL
METHYLMALONATE SERPL-SCNC: 1819 NMOL/L (ref 0–378)

## 2019-12-19 DIAGNOSIS — Z78.9 VEGETARIAN: Primary | ICD-10-CM

## 2020-01-06 ENCOUNTER — CLINICAL SUPPORT (OUTPATIENT)
Dept: INTERNAL MEDICINE | Facility: CLINIC | Age: 75
End: 2020-01-06

## 2020-01-06 DIAGNOSIS — E53.8 VITAMIN B 12 DEFICIENCY: Primary | ICD-10-CM

## 2020-01-06 PROCEDURE — 96372 THER/PROPH/DIAG INJ SC/IM: CPT | Performed by: FAMILY MEDICINE

## 2020-01-06 RX ORDER — CYANOCOBALAMIN 1000 UG/ML
1000 INJECTION, SOLUTION INTRAMUSCULAR; SUBCUTANEOUS ONCE
Status: COMPLETED | OUTPATIENT
Start: 2020-01-06 | End: 2020-01-06

## 2020-01-06 RX ADMIN — CYANOCOBALAMIN 1000 MCG: 1000 INJECTION, SOLUTION INTRAMUSCULAR; SUBCUTANEOUS at 09:45

## 2020-01-13 ENCOUNTER — CLINICAL SUPPORT (OUTPATIENT)
Dept: INTERNAL MEDICINE | Facility: CLINIC | Age: 75
End: 2020-01-13

## 2020-01-13 DIAGNOSIS — E53.9 VITAMIN B DEFICIENCY: ICD-10-CM

## 2020-01-13 PROCEDURE — 96372 THER/PROPH/DIAG INJ SC/IM: CPT | Performed by: FAMILY MEDICINE

## 2020-01-13 RX ORDER — CYANOCOBALAMIN 1000 UG/ML
1000 INJECTION, SOLUTION INTRAMUSCULAR; SUBCUTANEOUS WEEKLY
Status: DISCONTINUED | OUTPATIENT
Start: 2020-01-13 | End: 2022-11-30

## 2020-01-13 RX ADMIN — CYANOCOBALAMIN 1000 MCG: 1000 INJECTION, SOLUTION INTRAMUSCULAR; SUBCUTANEOUS at 10:36

## 2020-01-20 ENCOUNTER — CLINICAL SUPPORT (OUTPATIENT)
Dept: INTERNAL MEDICINE | Facility: CLINIC | Age: 75
End: 2020-01-20

## 2020-01-20 DIAGNOSIS — E53.8 VITAMIN B12 DEFICIENCY: ICD-10-CM

## 2020-01-20 PROCEDURE — 96372 THER/PROPH/DIAG INJ SC/IM: CPT | Performed by: FAMILY MEDICINE

## 2020-01-20 RX ORDER — METOPROLOL SUCCINATE 50 MG/1
50 TABLET, EXTENDED RELEASE ORAL DAILY
Qty: 90 TABLET | Refills: 3 | Status: SHIPPED | OUTPATIENT
Start: 2020-01-20 | End: 2020-02-20 | Stop reason: SDUPTHER

## 2020-01-20 RX ORDER — ATORVASTATIN CALCIUM 10 MG/1
TABLET, FILM COATED ORAL
Qty: 90 TABLET | Refills: 3 | Status: SHIPPED | OUTPATIENT
Start: 2020-01-20 | End: 2020-03-09 | Stop reason: SDUPTHER

## 2020-01-20 RX ADMIN — CYANOCOBALAMIN 1000 MCG: 1000 INJECTION, SOLUTION INTRAMUSCULAR; SUBCUTANEOUS at 09:49

## 2020-01-23 RX ORDER — LEVOTHYROXINE SODIUM 0.05 MG/1
50 TABLET ORAL DAILY
Qty: 90 TABLET | Refills: 3 | OUTPATIENT
Start: 2020-01-23

## 2020-01-27 ENCOUNTER — CLINICAL SUPPORT (OUTPATIENT)
Dept: INTERNAL MEDICINE | Facility: CLINIC | Age: 75
End: 2020-01-27

## 2020-01-27 DIAGNOSIS — E53.8 VITAMIN B 12 DEFICIENCY: ICD-10-CM

## 2020-01-27 PROCEDURE — 96372 THER/PROPH/DIAG INJ SC/IM: CPT | Performed by: FAMILY MEDICINE

## 2020-01-27 RX ADMIN — CYANOCOBALAMIN 1000 MCG: 1000 INJECTION, SOLUTION INTRAMUSCULAR; SUBCUTANEOUS at 16:38

## 2020-02-10 ENCOUNTER — CLINICAL SUPPORT (OUTPATIENT)
Dept: INTERNAL MEDICINE | Facility: CLINIC | Age: 75
End: 2020-02-10

## 2020-02-10 DIAGNOSIS — E53.8 VITAMIN B12 DEFICIENCY: ICD-10-CM

## 2020-02-10 PROCEDURE — 96372 THER/PROPH/DIAG INJ SC/IM: CPT | Performed by: FAMILY MEDICINE

## 2020-02-10 RX ADMIN — CYANOCOBALAMIN 1000 MCG: 1000 INJECTION, SOLUTION INTRAMUSCULAR; SUBCUTANEOUS at 10:11

## 2020-02-20 RX ORDER — METOPROLOL SUCCINATE 50 MG/1
50 TABLET, EXTENDED RELEASE ORAL DAILY
Qty: 90 TABLET | Refills: 3 | Status: SHIPPED | OUTPATIENT
Start: 2020-02-20 | End: 2021-02-12

## 2020-02-24 ENCOUNTER — TELEPHONE (OUTPATIENT)
Dept: INTERNAL MEDICINE | Facility: CLINIC | Age: 75
End: 2020-02-24

## 2020-02-24 ENCOUNTER — CLINICAL SUPPORT (OUTPATIENT)
Dept: INTERNAL MEDICINE | Facility: CLINIC | Age: 75
End: 2020-02-24

## 2020-02-24 PROCEDURE — 96372 THER/PROPH/DIAG INJ SC/IM: CPT | Performed by: FAMILY MEDICINE

## 2020-02-24 RX ORDER — BUSPIRONE HYDROCHLORIDE 10 MG/1
TABLET ORAL
Qty: 180 TABLET | Refills: 3 | Status: CANCELLED | OUTPATIENT
Start: 2020-02-24

## 2020-02-24 RX ADMIN — CYANOCOBALAMIN 1000 MCG: 1000 INJECTION, SOLUTION INTRAMUSCULAR; SUBCUTANEOUS at 09:30

## 2020-02-24 NOTE — TELEPHONE ENCOUNTER
Patient called asking to have her Buspar refilled - this has not been filled through this office before.  Please advise - patient is out of this medication.

## 2020-02-25 RX ORDER — BUSPIRONE HYDROCHLORIDE 10 MG/1
TABLET ORAL
Qty: 60 TABLET | Refills: 0 | Status: SHIPPED | OUTPATIENT
Start: 2020-02-25 | End: 2020-03-09 | Stop reason: SDUPTHER

## 2020-02-25 NOTE — TELEPHONE ENCOUNTER
Patient notified and expressed understanding.  Patient stated that she will call back to schedule an appointment.

## 2020-03-09 ENCOUNTER — OFFICE VISIT (OUTPATIENT)
Dept: INTERNAL MEDICINE | Facility: CLINIC | Age: 75
End: 2020-03-09

## 2020-03-09 VITALS
DIASTOLIC BLOOD PRESSURE: 54 MMHG | OXYGEN SATURATION: 99 % | HEIGHT: 66 IN | TEMPERATURE: 97.9 F | BODY MASS INDEX: 21.66 KG/M2 | WEIGHT: 134.8 LBS | SYSTOLIC BLOOD PRESSURE: 100 MMHG | HEART RATE: 61 BPM

## 2020-03-09 DIAGNOSIS — F41.9 ANXIETY: Primary | ICD-10-CM

## 2020-03-09 DIAGNOSIS — J47.9 BRONCHIECTASIS WITHOUT COMPLICATION (HCC): ICD-10-CM

## 2020-03-09 DIAGNOSIS — D69.6 THROMBOCYTOPENIA (HCC): ICD-10-CM

## 2020-03-09 DIAGNOSIS — I10 ESSENTIAL HYPERTENSION: ICD-10-CM

## 2020-03-09 DIAGNOSIS — Z78.9 VEGETARIAN: ICD-10-CM

## 2020-03-09 PROCEDURE — 96372 THER/PROPH/DIAG INJ SC/IM: CPT | Performed by: FAMILY MEDICINE

## 2020-03-09 PROCEDURE — 99214 OFFICE O/P EST MOD 30 MIN: CPT | Performed by: FAMILY MEDICINE

## 2020-03-09 RX ORDER — BUSPIRONE HYDROCHLORIDE 5 MG/1
TABLET ORAL
Qty: 180 TABLET | Refills: 1 | Status: SHIPPED | OUTPATIENT
Start: 2020-03-09 | End: 2020-10-30

## 2020-03-09 RX ORDER — ATORVASTATIN CALCIUM 10 MG/1
10 TABLET, FILM COATED ORAL DAILY
Qty: 90 TABLET | Refills: 3 | Status: SHIPPED | OUTPATIENT
Start: 2020-03-09 | End: 2021-03-01

## 2020-03-09 RX ORDER — ACYCLOVIR 50 MG/G
OINTMENT TOPICAL
Qty: 15 G | Refills: 1 | Status: SHIPPED | OUTPATIENT
Start: 2020-03-09

## 2020-03-09 RX ADMIN — CYANOCOBALAMIN 1000 MCG: 1000 INJECTION, SOLUTION INTRAMUSCULAR; SUBCUTANEOUS at 10:41

## 2020-03-09 NOTE — PROGRESS NOTES
Karly Mcgrath is a 74 y.o. female.      Assessment/Plan   Problem List Items Addressed This Visit        Cardiovascular and Mediastinum    Hypertension       Respiratory    Bronchiectasis without complication (CMS/HCC)    Overview     Followed by pulmonolgy            Hematopoietic and Hemostatic    Thrombocytopenia (CMS/[facility])    Relevant Orders    CBC & Differential       Other    Anxiety - Primary    Vegetarian    Relevant Orders    Methylmalonic Acid, Serum         Follow-up results of blood work continue BuSpar 5 mg twice daily  As needed or  Return in about 3 months (around 6/9/2020), or if symptoms worsen or fail to improve, for Recheck, Next scheduled follow up.      Chief Complaint   Patient presents with   • Anxiety - would like for Dr. Adame to  prescribe the buspiron     Social History     Tobacco Use   • Smoking status: Never Smoker   • Smokeless tobacco: Never Used   Substance Use Topics   • Alcohol use: No   • Drug use: No       History of Present Illness   Patient follow-up appoint for chronic problems hypertension bronchiectasis thrombocytopenia anxiety doing well with present treatment plan minimal use of inhaler no bruising noted with thrombocytopenia and her anxiety is much improved with the BuSpar and she like to have that prescribed through this office her blood pressures well controlled no chest pain shortness of breath or increased fatigue.  She has had some vitamin B12 deficiency secondary to her diet and she is in the midst of having IM therapy as well as oral therapy  The following portions of the patient's history were reviewed and updated as appropriate:PMHroutine: Social history , Allergies, Current Medications, Active Problem List and Health Maintenance    Review of Systems   Constitutional: Negative.    HENT: Negative.    Respiratory: Negative for choking, chest tightness, shortness of breath, wheezing and stridor.    Gastrointestinal: Negative.    Genitourinary: Negative.   "  Musculoskeletal: Negative.    Skin: Negative.    Neurological: Negative.    Hematological: Negative.    Psychiatric/Behavioral: Negative for dysphoric mood and sleep disturbance. The patient is nervous/anxious.        Objective   Vitals:    03/09/20 1027   BP: 100/54   BP Location: Right arm   Patient Position: Sitting   Cuff Size: Adult   Pulse: 61   Temp: 97.9 °F (36.6 °C)   TempSrc: Oral   SpO2: 99%   Weight: 61.1 kg (134 lb 12.8 oz)   Height: 167.6 cm (65.98\")     Body mass index is 21.77 kg/m².  Physical Exam   Constitutional: She is oriented to person, place, and time. She appears well-developed and well-nourished.   HENT:   Head: Normocephalic and atraumatic.   Eyes: Pupils are equal, round, and reactive to light. Conjunctivae are normal.   Cardiovascular: Normal rate and regular rhythm.   Pulmonary/Chest: Effort normal and breath sounds normal. No respiratory distress.   Abdominal: Soft. Bowel sounds are normal.   Musculoskeletal: She exhibits no edema.   Neurological: She is alert and oriented to person, place, and time.   Psychiatric: She has a normal mood and affect. Her behavior is normal. Judgment and thought content normal.   Nursing note reviewed.    Reviewed Data:  No visits with results within 1 Month(s) from this visit.   Latest known visit with results is:   Office Visit on 12/11/2019   Component Date Value Ref Range Status   • Total Cholesterol 12/11/2019 150  0 - 200 mg/dL Final   • Triglycerides 12/11/2019 79  0 - 150 mg/dL Final   • HDL Cholesterol 12/11/2019 55  40 - 60 mg/dL Final   • LDL Cholesterol  12/11/2019 79  0 - 100 mg/dL Final   • VLDL Cholesterol 12/11/2019 15.8  5 - 40 mg/dL Final   • LDL/HDL Ratio 12/11/2019 1.44   Final   • TSH 12/11/2019 4.770* 0.270 - 4.200 uIU/mL Final   • Methylmalonic Acid 12/11/2019 1819* 0 - 378 nmol/L Final   • Disclaimer: 12/11/2019 Comment   Final    This test was developed and its performance characteristics  determined by [employer]. It has not been " cleared or approved  by the Food and Drug Administration.   • 25 Hydroxy, Vitamin D 12/11/2019 61.2  30.0 - 100.0 ng/ml Final

## 2020-03-11 LAB
BASOPHILS # BLD AUTO: ABNORMAL 10*3/UL
DIFFERENTIAL COMMENT: ABNORMAL
EOSINOPHIL # BLD AUTO: ABNORMAL 10*3/UL
EOSINOPHIL # BLD MANUAL: 0.07 10*3/MM3 (ref 0–0.4)
EOSINOPHIL NFR BLD AUTO: ABNORMAL %
EOSINOPHIL NFR BLD MANUAL: 1 % (ref 0.3–6.2)
ERYTHROCYTE [DISTWIDTH] IN BLOOD BY AUTOMATED COUNT: 13 % (ref 12.3–15.4)
HCT VFR BLD AUTO: 35.3 % (ref 34–46.6)
HGB BLD-MCNC: 11.6 G/DL (ref 12–15.9)
LYMPHOCYTES # BLD AUTO: ABNORMAL 10*3/UL
LYMPHOCYTES # BLD MANUAL: 1.2 10*3/MM3 (ref 0.7–3.1)
LYMPHOCYTES NFR BLD AUTO: ABNORMAL %
LYMPHOCYTES NFR BLD MANUAL: 17 % (ref 19.6–45.3)
Lab: NORMAL
MCH RBC QN AUTO: 26.2 PG (ref 26.6–33)
MCHC RBC AUTO-ENTMCNC: 32.9 G/DL (ref 31.5–35.7)
MCV RBC AUTO: 79.9 FL (ref 79–97)
METHYLMALONATE SERPL-SCNC: 253 NMOL/L (ref 0–378)
MONOCYTES # BLD MANUAL: 0.63 10*3/MM3 (ref 0.1–0.9)
MONOCYTES NFR BLD AUTO: ABNORMAL %
MONOCYTES NFR BLD MANUAL: 9 % (ref 5–12)
NEUTROPHILS # BLD MANUAL: 5.15 10*3/MM3 (ref 1.7–7)
NEUTROPHILS NFR BLD AUTO: ABNORMAL %
NEUTROPHILS NFR BLD MANUAL: 73 % (ref 42.7–76)
PLATELET # BLD AUTO: 144 10*3/MM3 (ref 140–450)
PLATELET BLD QL SMEAR: ABNORMAL
RBC # BLD AUTO: 4.42 10*6/MM3 (ref 3.77–5.28)
RBC MORPH BLD: ABNORMAL
WBC # BLD AUTO: 7.05 10*3/MM3 (ref 3.4–10.8)

## 2020-03-19 ENCOUNTER — TELEPHONE (OUTPATIENT)
Dept: INTERNAL MEDICINE | Facility: CLINIC | Age: 75
End: 2020-03-19

## 2020-03-19 NOTE — TELEPHONE ENCOUNTER
[facility] approved [place] request for acyclovir 5% ointment.  [facility] ([phone]) notified.

## 2020-06-04 RX ORDER — LEVOTHYROXINE SODIUM 0.07 MG/1
TABLET ORAL
Qty: 90 TABLET | Refills: 0 | Status: SHIPPED | OUTPATIENT
Start: 2020-06-04 | End: 2020-08-31

## 2020-07-31 ENCOUNTER — TELEPHONE (OUTPATIENT)
Dept: INTERNAL MEDICINE | Facility: CLINIC | Age: 75
End: 2020-07-31

## 2020-07-31 NOTE — TELEPHONE ENCOUNTER
Caller: Krzysztof Mcgrath Sr.    Relationship to patient: Self    Best call back number: [phone]     Concerns or Questions if Applicable:   PATIENT STATED THAT HIS SON TESTED POSITIVE FOR COVID AND PATIENT AND WIFE WERE WITH THE SON ON Sunday.  NEEDS TO KNOW IF HE SHOULD BE TESTED FOR COVID.    PLEASE ADVISE

## 2020-08-31 RX ORDER — LEVOTHYROXINE SODIUM 0.07 MG/1
TABLET ORAL
Qty: 90 TABLET | Refills: 0 | Status: SHIPPED | OUTPATIENT
Start: 2020-08-31 | End: 2020-09-11 | Stop reason: DRUGHIGH

## 2020-09-10 ENCOUNTER — LAB (OUTPATIENT)
Dept: LAB | Facility: HOSPITAL | Age: 75
End: 2020-09-10

## 2020-09-10 ENCOUNTER — OFFICE VISIT (OUTPATIENT)
Dept: INTERNAL MEDICINE | Facility: CLINIC | Age: 75
End: 2020-09-10

## 2020-09-10 VITALS
TEMPERATURE: 97.5 F | BODY MASS INDEX: 22.13 KG/M2 | DIASTOLIC BLOOD PRESSURE: 60 MMHG | WEIGHT: 132.8 LBS | SYSTOLIC BLOOD PRESSURE: 122 MMHG | OXYGEN SATURATION: 97 % | HEIGHT: 65 IN | HEART RATE: 69 BPM

## 2020-09-10 DIAGNOSIS — Z00.00 MEDICARE ANNUAL WELLNESS VISIT, SUBSEQUENT: Primary | ICD-10-CM

## 2020-09-10 DIAGNOSIS — F41.9 ANXIETY: ICD-10-CM

## 2020-09-10 DIAGNOSIS — E78.2 MIXED HYPERLIPIDEMIA: ICD-10-CM

## 2020-09-10 DIAGNOSIS — E03.9 HYPOTHYROIDISM, UNSPECIFIED TYPE: ICD-10-CM

## 2020-09-10 DIAGNOSIS — D51.3 OTHER DIETARY VITAMIN B12 DEFICIENCY ANEMIA: ICD-10-CM

## 2020-09-10 DIAGNOSIS — I10 ESSENTIAL HYPERTENSION: ICD-10-CM

## 2020-09-10 PROBLEM — D51.9 ANEMIA DUE TO VITAMIN B12 DEFICIENCY: Status: ACTIVE | Noted: 2020-09-10

## 2020-09-10 LAB
ALBUMIN SERPL-MCNC: 3.8 G/DL (ref 3.5–5.2)
ALBUMIN/GLOB SERPL: 1.5 G/DL
ALP SERPL-CCNC: 79 U/L (ref 39–117)
ALT SERPL W P-5'-P-CCNC: 13 U/L (ref 1–33)
ANION GAP SERPL CALCULATED.3IONS-SCNC: 5.6 MMOL/L (ref 5–15)
AST SERPL-CCNC: 16 U/L (ref 1–32)
BASOPHILS # BLD AUTO: 0.03 10*3/MM3 (ref 0–0.2)
BASOPHILS NFR BLD AUTO: 0.4 % (ref 0–1.5)
BILIRUB SERPL-MCNC: 0.5 MG/DL (ref 0–1.2)
BUN SERPL-MCNC: 28 MG/DL (ref 8–23)
BUN/CREAT SERPL: 29.2 (ref 7–25)
CALCIUM SPEC-SCNC: 9.8 MG/DL (ref 8.6–10.5)
CHLORIDE SERPL-SCNC: 101 MMOL/L (ref 98–107)
CHOLEST SERPL-MCNC: 133 MG/DL (ref 0–200)
CO2 SERPL-SCNC: 25.4 MMOL/L (ref 22–29)
CREAT SERPL-MCNC: 0.96 MG/DL (ref 0.57–1)
DEPRECATED RDW RBC AUTO: 44.7 FL (ref 37–54)
EOSINOPHIL # BLD AUTO: 0.1 10*3/MM3 (ref 0–0.4)
EOSINOPHIL NFR BLD AUTO: 1.5 % (ref 0.3–6.2)
ERYTHROCYTE [DISTWIDTH] IN BLOOD BY AUTOMATED COUNT: 14.9 % (ref 12.3–15.4)
GFR SERPL CREATININE-BSD FRML MDRD: 57 ML/MIN/1.73
GLOBULIN UR ELPH-MCNC: 2.5 GM/DL
GLUCOSE SERPL-MCNC: 95 MG/DL (ref 65–99)
HCT VFR BLD AUTO: 35.4 % (ref 34–46.6)
HDLC SERPL-MCNC: 70 MG/DL (ref 40–60)
HGB BLD-MCNC: 11.1 G/DL (ref 12–15.9)
IMM GRANULOCYTES # BLD AUTO: 0.05 10*3/MM3 (ref 0–0.05)
IMM GRANULOCYTES NFR BLD AUTO: 0.7 % (ref 0–0.5)
LDLC SERPL CALC-MCNC: 51 MG/DL (ref 0–100)
LDLC/HDLC SERPL: 0.73 {RATIO}
LYMPHOCYTES # BLD AUTO: 1.22 10*3/MM3 (ref 0.7–3.1)
LYMPHOCYTES NFR BLD AUTO: 18.2 % (ref 19.6–45.3)
MCH RBC QN AUTO: 25.5 PG (ref 26.6–33)
MCHC RBC AUTO-ENTMCNC: 31.4 G/DL (ref 31.5–35.7)
MCV RBC AUTO: 81.2 FL (ref 79–97)
MONOCYTES # BLD AUTO: 0.64 10*3/MM3 (ref 0.1–0.9)
MONOCYTES NFR BLD AUTO: 9.6 % (ref 5–12)
NEUTROPHILS NFR BLD AUTO: 4.66 10*3/MM3 (ref 1.7–7)
NEUTROPHILS NFR BLD AUTO: 69.6 % (ref 42.7–76)
NRBC BLD AUTO-RTO: 0 /100 WBC (ref 0–0.2)
PLATELET # BLD AUTO: 44 10*3/MM3 (ref 140–450)
POTASSIUM SERPL-SCNC: 4.6 MMOL/L (ref 3.5–5.2)
PROT SERPL-MCNC: 6.3 G/DL (ref 6–8.5)
RBC # BLD AUTO: 4.36 10*6/MM3 (ref 3.77–5.28)
SODIUM SERPL-SCNC: 132 MMOL/L (ref 136–145)
T4 FREE SERPL-MCNC: 1.94 NG/DL (ref 0.93–1.7)
TRIGL SERPL-MCNC: 60 MG/DL (ref 0–150)
TSH SERPL DL<=0.05 MIU/L-ACNC: 0.03 UIU/ML (ref 0.27–4.2)
VLDLC SERPL-MCNC: 12 MG/DL (ref 5–40)
WBC # BLD AUTO: 6.7 10*3/MM3 (ref 3.4–10.8)

## 2020-09-10 PROCEDURE — 83921 ORGANIC ACID SINGLE QUANT: CPT | Performed by: FAMILY MEDICINE

## 2020-09-10 PROCEDURE — 80061 LIPID PANEL: CPT | Performed by: FAMILY MEDICINE

## 2020-09-10 PROCEDURE — 84439 ASSAY OF FREE THYROXINE: CPT | Performed by: FAMILY MEDICINE

## 2020-09-10 PROCEDURE — 99214 OFFICE O/P EST MOD 30 MIN: CPT | Performed by: FAMILY MEDICINE

## 2020-09-10 PROCEDURE — 36415 COLL VENOUS BLD VENIPUNCTURE: CPT | Performed by: FAMILY MEDICINE

## 2020-09-10 PROCEDURE — 80053 COMPREHEN METABOLIC PANEL: CPT | Performed by: FAMILY MEDICINE

## 2020-09-10 PROCEDURE — 85025 COMPLETE CBC W/AUTO DIFF WBC: CPT | Performed by: FAMILY MEDICINE

## 2020-09-10 PROCEDURE — G0439 PPPS, SUBSEQ VISIT: HCPCS | Performed by: FAMILY MEDICINE

## 2020-09-10 PROCEDURE — 84443 ASSAY THYROID STIM HORMONE: CPT | Performed by: FAMILY MEDICINE

## 2020-09-10 NOTE — PROGRESS NOTES
Karly Mcgrath is a 75 y.o. female.      Assessment/Plan   Problem List Items Addressed This Visit        Cardiovascular and Mediastinum    Hyperlipidemia    Relevant Orders    Lipid Panel    Hypertension    Relevant Orders    Comprehensive Metabolic Panel       Digestive    Anemia due to vitamin B12 deficiency    Relevant Orders    Methylmalonic Acid, Serum    CBC & Differential       Endocrine    Hypothyroidism    Relevant Orders    TSH    T4, Free       Other    Anxiety    Medicare annual wellness visit, subsequent - Primary         Follow-up results of blood work for ongoing management of chronic medical problems Chem-7 monitoring blood pressure to keep below 150/90  Recommended mammogram through [place] since recent ultrasound and previous mammogram performed there she gets these through her gynecologist  Return in about 6 months (around 3/10/2021), or if symptoms worsen or fail to improve, for Recheck, Next scheduled follow up.      Chief Complaint   Patient presents with   • follow up to anxiety   • follow up to hypothyroidism   • follow up to hypertension   • follow up to hyperliipidemia   • Medicare Wellness-subsequent     Social History     Tobacco Use   • Smoking status: Never Smoker   • Smokeless tobacco: Never Used   Substance Use Topics   • Alcohol use: No   • Drug use: No       History of Present Illness   Follow-up ongoing management of chronic problems of anxiety hypothyroidism hypertension hyperlipidemia doing well with no acute concerns no unwanted side effects of medications.  Chest pain shortness of breath or increased fatigue.  The following portions of the patient's history were reviewed and updated as appropriate:PMHroutine: Social history , Allergies, Current Medications, Active Problem List and Health Maintenance    Review of Systems   Constitutional: Negative.    HENT: Negative.    Respiratory: Negative.    Cardiovascular: Negative.    Musculoskeletal: Negative.    Psychiatric/Behavioral:  "Negative.        Objective   Vitals:    09/10/20 0916   BP: 122/60   BP Location: Right arm   Patient Position: Sitting   Cuff Size: Adult   Pulse: 69   Temp: 97.5 °F (36.4 °C)   TempSrc: Temporal   SpO2: 97%   Weight: 60.2 kg (132 lb 12.8 oz)   Height: 165.1 cm (65\")     Body mass index is 22.1 kg/m².  Physical Exam   Constitutional: She is oriented to person, place, and time. She appears well-developed and well-nourished.   HENT:   Head: Normocephalic and atraumatic.   Eyes: EOM are normal. No scleral icterus.   Neck: No JVD present. No thyromegaly present.   Cardiovascular: Normal rate and regular rhythm.   Pulmonary/Chest: Effort normal and breath sounds normal.   Musculoskeletal: Normal range of motion. She exhibits no edema.   Neurological: She is alert and oriented to person, place, and time.   Psychiatric: She has a normal mood and affect. Her behavior is normal. Thought content normal.   Nursing note and vitals reviewed.    Reviewed Data:  No visits with results within 1 Month(s) from this visit.   Latest known visit with results is:   Admission on 07/31/2020, Discharged on 07/31/2020   Component Date Value Ref Range Status   • SARS-CoV-2, BORIS 07/31/2020 Not Detected  Not Detected Final    This test was developed and its performance characteristics determined  by [facility]. This test has not been FDA cleared or  approved. This test has been authorized by FDA under an Emergency Use  Authorization (EUA). This test is only authorized for the duration of  time the declaration that circumstances exist justifying the  authorization of the emergency use of in vitro diagnostic tests for  detection of SARS-CoV-2 virus and/or diagnosis of COVID-19 infection  under section 564(b)(1) of the Act, 21 U.S.C. 360bbb-3(b)(1), unless  the authorization is terminated or revoked sooner.  When diagnostic testing is negative, the possibility of a false  negative result should be considered in the context of a " patient's  recent exposures and the presence of clinical signs and symptoms  consistent with COVID-19. An individual without symptoms of COVID-19  and who is not shedding SARS-CoV-2 virus would expect to have a  negative (not detected) result in this assay.

## 2020-09-10 NOTE — PROGRESS NOTES
The ABCs of the Annual Wellness Visit  Subsequent Medicare Wellness Visit    Chief Complaint   Patient presents with   • follow up to anxiety   • follow up to hypothyroidism   • follow up to hypertension   • follow up to hyperliipidemia   • Medicare Wellness-subsequent       Subjective   History of Present Illness:  Karly Mcgrath is a 75 y.o. female who presents for a Subsequent Medicare Wellness Visit.    HEALTH RISK ASSESSMENT    Recent Hospitalizations:  No hospitalization(s) within the last year.    Current Medical Providers:  Patient Care Team:  Vito Adame MD as PCP - General (Family Medicine)  Landry Hoffman MD as PCP - Family Medicine  Vito Adame MD as PCP - Claims Attributed    Smoking Status:  Social History     Tobacco Use   Smoking Status Never Smoker   Smokeless Tobacco Never Used       Alcohol Consumption:  Social History     Substance and Sexual Activity   Alcohol Use No       Depression Screen:   PHQ-2/PHQ-9 Depression Screening 9/10/2020   Little interest or pleasure in doing things 0   Feeling down, depressed, or hopeless 0   Trouble falling or staying asleep, or sleeping too much -   Feeling tired or having little energy -   Poor appetite or overeating -   Feeling bad about yourself - or that you are a failure or have let yourself or your family down -   Trouble concentrating on things, such as reading the newspaper or watching television -   Moving or speaking so slowly that other people could have noticed. Or the opposite - being so fidgety or restless that you have been moving around a lot more than usual -   Thoughts that you would be better off dead, or of hurting yourself in some way -   Total Score 0   If you checked off any problems, how difficult have these problems made it for you to do your work, take care of things at home, or get along with other people? -       Fall Risk Screen:  STEADI Fall Risk Assessment was completed, and patient is at LOW risk for falls.Assessment  completed on:9/10/2020    Health Habits and Functional and Cognitive Screening:  Functional & Cognitive Status 9/10/2020   Do you have difficulty preparing food and eating? No   Do you have difficulty bathing yourself, getting dressed or grooming yourself? No   Do you have difficulty using the toilet? No   Do you have difficulty moving around from place to place? No   Do you have trouble with steps or getting out of a bed or a chair? No   Current Diet Well Balanced Diet   Dental Exam Up to date   Eye Exam Up to date   Exercise (times per week) 6 times per week   Current Exercise Activities Include Walking   Do you need help using the phone?  No   Are you deaf or do you have serious difficulty hearing?  Yes   Do you need help with transportation? No   Do you need help shopping? No   Do you need help preparing meals?  No   Do you need help with housework?  No   Do you need help with laundry? No   Do you need help taking your medications? No   Do you need help managing money? No   Do you ever drive or ride in a car without wearing a seat belt? No   Have you felt unusual stress, anger or loneliness in the last month? No   Who do you live with? Spouse   If you need help, do you have trouble finding someone available to you? No   Have you been bothered in the last four weeks by sexual problems? No   Do you have difficulty concentrating, remembering or making decisions? No         Does the patient have evidence of cognitive impairment? No    Asprin use counseling:Does not need ASA (and currently is not on it)    Age-appropriate Screening Schedule:  Refer to the list below for future screening recommendations based on patient's age, sex and/or medical conditions. Orders for these recommended tests are listed in the plan section. The patient has been provided with a written plan.    Health Maintenance   Topic Date Due   • TDAP/TD VACCINES (1 - Tdap) 09/02/1956   • ZOSTER VACCINE (1 of 2) 09/02/1994   • LIPID PANEL   12/11/2020   • MAMMOGRAM  04/15/2021   • DXA SCAN  06/12/2021   • INFLUENZA VACCINE  Discontinued   • COLONOSCOPY  Discontinued          The following portions of the patient's history were reviewed and updated as appropriate: allergies, current medications, past family history, past medical history, past social history, past surgical history and problem list.    Outpatient Medications Prior to Visit   Medication Sig Dispense Refill   • acyclovir (ZOVIRAX) 200 MG capsule Take 1 capsule by mouth 5 (Five) Times a Day. (Patient taking differently: Take 200 mg by mouth 5 (Five) Times a Day As Needed (cold sores).) 25 capsule 2   • acyclovir (ZOVIRAX) 5 % ointment Apply  topically to the appropriate area as directed 5 (Five) Times a Day As Needed (cold sore). 15 g 1   • atorvastatin (LIPITOR) 10 MG tablet Take 1 tablet by mouth Daily. 90 tablet 3   • busPIRone (BUSPAR) 5 MG tablet TAKE 1 TABLET BY MOUTH TWICE DAILY FOR ANXIETY 180 tablet 1   • Calcium Carb-Cholecalciferol (CALCIUM 600 + D) 600-200 MG-UNIT tablet Take 2,000 mg by mouth 2 (Two) Times a Day.     • cholecalciferol (VITAMIN D3) 1000 units tablet Take 2,000 Units by mouth Daily.     • levothyroxine (SYNTHROID, LEVOTHROID) 75 MCG tablet TAKE ONE TABLET BY MOUTH DAILY 90 tablet 0   • losartan (COZAAR) 50 MG tablet Take 1 tablet by mouth Daily. 90 tablet 2   • metoprolol succinate XL (TOPROL-XL) 50 MG 24 hr tablet Take 1 tablet by mouth Daily. 90 tablet 3     Facility-Administered Medications Prior to Visit   Medication Dose Route Frequency Provider Last Rate Last Dose   • cyanocobalamin injection 1,000 mcg  1,000 mcg Intramuscular Weekly Vito Adame MD   1,000 mcg at 03/09/20 1041       Patient Active Problem List   Diagnosis   • Anxiety   • Bronchiectasis without complication (CMS/HCC)   • Hyperlipidemia   • Hypertension   • Osteopenia   • Hypothyroidism   • Health care maintenance   • Herpes simplex type 1 infection   • Urethritis   • Hypovitaminosis D  "  • Medicare annual wellness visit, initial   • Menopausal and postmenopausal disorder   • Vegetarian   • Thrombocytopenia (CMS/[facility])   • Medicare annual wellness visit, subsequent   • Anemia due to vitamin B12 deficiency       Advanced Care Planning:  ACP discussion was held with the patient during this visit. Patient does not have an advance directive, information provided.    Review of Systems    Compared to one year ago, the patient feels her physical health is the same.  Compared to one year ago, the patient feels her mental health is the same.    Reviewed chart for potential of high risk medication in the elderly: yes  Reviewed chart for potential of harmful drug interactions in the elderly:yes    Objective         Vitals:    09/10/20 0916   BP: 122/60   BP Location: Right arm   Patient Position: Sitting   Cuff Size: Adult   Pulse: 69   Temp: 97.5 °F (36.4 °C)   TempSrc: Temporal   SpO2: 97%   Weight: 60.2 kg (132 lb 12.8 oz)   Height: 165.1 cm (65\")   PainSc: 0-No pain       Body mass index is 22.1 kg/m².  Discussed the patient's BMI with her. The BMI is in the acceptable range.    Physical Exam          Assessment/Plan   Medicare Risks and Personalized Health Plan  CMS Preventative Services Quick Reference  Advance Directive Discussion  Immunizations Discussed/Encouraged (specific immunizations; Influenza and Pneumococcal 23 )    The above risks/problems have been discussed with the patient.  Pertinent information has been shared with the patient in the After Visit Summary.  Follow up plans and orders are seen below in the Assessment/Plan Section.    Diagnoses and all orders for this visit:    1. Medicare annual wellness visit, subsequent (Primary)    2. Essential hypertension  -     Comprehensive Metabolic Panel    3. Mixed hyperlipidemia  -     Lipid Panel    4. Hypothyroidism, unspecified type  -     TSH  -     T4, Free    5. Anxiety    6. Other dietary vitamin B12 deficiency anemia  -     Methylmalonic " Acid, Serum  -     CBC & Differential      Follow Up:  Return in about 6 months (around 3/10/2021), or if symptoms worsen or fail to improve, for Recheck, Next scheduled follow up.     An After Visit Summary and PPPS were given to the patient.       She declines immunizations  Follow-up ongoing management of chronic medical problems

## 2020-09-11 DIAGNOSIS — D69.1 PLATELET DISORDER (HCC): Primary | ICD-10-CM

## 2020-09-11 DIAGNOSIS — E03.9 HYPOTHYROIDISM, UNSPECIFIED TYPE: ICD-10-CM

## 2020-09-11 RX ORDER — LEVOTHYROXINE SODIUM 0.05 MG/1
50 TABLET ORAL DAILY
Qty: 30 TABLET | Refills: 1 | Status: SHIPPED | OUTPATIENT
Start: 2020-09-11 | End: 2020-11-10

## 2020-09-14 LAB
Lab: NORMAL
METHYLMALONATE SERPL-SCNC: 222 NMOL/L (ref 0–378)

## 2020-09-14 RX ORDER — LOSARTAN POTASSIUM 50 MG/1
TABLET ORAL
Qty: 90 TABLET | Refills: 0 | Status: SHIPPED | OUTPATIENT
Start: 2020-09-14 | End: 2020-11-12 | Stop reason: SDUPTHER

## 2020-09-30 ENCOUNTER — CONSULT (OUTPATIENT)
Dept: ONCOLOGY | Facility: CLINIC | Age: 75
End: 2020-09-30

## 2020-09-30 ENCOUNTER — LAB (OUTPATIENT)
Dept: OTHER | Facility: HOSPITAL | Age: 75
End: 2020-09-30

## 2020-09-30 VITALS
HEIGHT: 65 IN | SYSTOLIC BLOOD PRESSURE: 177 MMHG | RESPIRATION RATE: 14 BRPM | DIASTOLIC BLOOD PRESSURE: 73 MMHG | OXYGEN SATURATION: 99 % | BODY MASS INDEX: 22.29 KG/M2 | WEIGHT: 133.8 LBS | HEART RATE: 72 BPM | TEMPERATURE: 97.8 F

## 2020-09-30 DIAGNOSIS — D69.6 THROMBOCYTOPENIA (HCC): Primary | ICD-10-CM

## 2020-09-30 DIAGNOSIS — D69.1 PLATELET DISORDER (HCC): Primary | ICD-10-CM

## 2020-09-30 DIAGNOSIS — D50.9 MICROCYTIC ANEMIA: ICD-10-CM

## 2020-09-30 LAB
BASOPHILS # BLD AUTO: 0.04 10*3/MM3 (ref 0–0.2)
BASOPHILS NFR BLD AUTO: 0.6 % (ref 0–1.5)
DEPRECATED RDW RBC AUTO: 41.1 FL (ref 37–54)
EOSINOPHIL # BLD AUTO: 0.12 10*3/MM3 (ref 0–0.4)
EOSINOPHIL NFR BLD AUTO: 1.8 % (ref 0.3–6.2)
ERYTHROCYTE [DISTWIDTH] IN BLOOD BY AUTOMATED COUNT: 15 % (ref 12.3–15.4)
FERRITIN SERPL-MCNC: 14.7 NG/ML (ref 13–150)
FOLATE SERPL-MCNC: 8.35 NG/ML (ref 4.78–24.2)
HCT VFR BLD AUTO: 34 % (ref 34–46.6)
HGB BLD-MCNC: 11.6 G/DL (ref 12–15.9)
HGB RETIC QN AUTO: 28.6 PG (ref 29.8–36.1)
HYPOCHROMIA BLD QL: NORMAL
IMM GRANULOCYTES # BLD AUTO: 0.06 10*3/MM3 (ref 0–0.05)
IMM GRANULOCYTES NFR BLD AUTO: 0.9 % (ref 0–0.5)
IMM RETICS NFR: 12.2 % (ref 3–15.8)
IRON 24H UR-MRATE: 38 MCG/DL (ref 37–145)
IRON SATN MFR SERPL: 9 % (ref 20–50)
LYMPHOCYTES # BLD AUTO: 1.32 10*3/MM3 (ref 0.7–3.1)
LYMPHOCYTES NFR BLD AUTO: 19.6 % (ref 19.6–45.3)
MCH RBC QN AUTO: 25.9 PG (ref 26.6–33)
MCHC RBC AUTO-ENTMCNC: 34.1 G/DL (ref 31.5–35.7)
MCV RBC AUTO: 75.9 FL (ref 79–97)
MONOCYTES # BLD AUTO: 0.64 10*3/MM3 (ref 0.1–0.9)
MONOCYTES NFR BLD AUTO: 9.5 % (ref 5–12)
NEUTROPHILS NFR BLD AUTO: 4.54 10*3/MM3 (ref 1.7–7)
NEUTROPHILS NFR BLD AUTO: 67.6 % (ref 42.7–76)
NRBC BLD AUTO-RTO: 0 /100 WBC (ref 0–0.2)
PLAT MORPH BLD: NORMAL
PLATELET # BLD AUTO: 11 10*3/MM3 (ref 140–450)
PLATELET # BLD AUTO: 11 10*3/MM3 (ref 140–450)
PLATELETS (CITRATED) BY AUTOMATED COUNT: 10 10*3/MM3 (ref 140–450)
PLATELETS.RETICULATED NFR BLD AUTO: 36.3 % (ref 0.9–6.5)
RBC # BLD AUTO: 4.48 10*6/MM3 (ref 3.77–5.28)
RETICS # AUTO: 0.08 10*6/MM3 (ref 0.02–0.13)
RETICS/RBC NFR AUTO: 1.77 % (ref 0.7–1.9)
TIBC SERPL-MCNC: 443 MCG/DL (ref 298–536)
TRANSFERRIN SERPL-MCNC: 297 MG/DL (ref 200–360)
VIT B12 BLD-MCNC: 1461 PG/ML (ref 211–946)
WBC # BLD AUTO: 6.72 10*3/MM3 (ref 3.4–10.8)
WBC MORPH BLD: NORMAL

## 2020-09-30 PROCEDURE — 82746 ASSAY OF FOLIC ACID SERUM: CPT | Performed by: INTERNAL MEDICINE

## 2020-09-30 PROCEDURE — 82728 ASSAY OF FERRITIN: CPT | Performed by: INTERNAL MEDICINE

## 2020-09-30 PROCEDURE — 85049 AUTOMATED PLATELET COUNT: CPT | Performed by: INTERNAL MEDICINE

## 2020-09-30 PROCEDURE — 85025 COMPLETE CBC W/AUTO DIFF WBC: CPT | Performed by: INTERNAL MEDICINE

## 2020-09-30 PROCEDURE — 36415 COLL VENOUS BLD VENIPUNCTURE: CPT

## 2020-09-30 PROCEDURE — 85055 RETICULATED PLATELET ASSAY: CPT | Performed by: INTERNAL MEDICINE

## 2020-09-30 PROCEDURE — 84466 ASSAY OF TRANSFERRIN: CPT | Performed by: INTERNAL MEDICINE

## 2020-09-30 PROCEDURE — 99205 OFFICE O/P NEW HI 60 MIN: CPT | Performed by: INTERNAL MEDICINE

## 2020-09-30 PROCEDURE — 88185 FLOWCYTOMETRY/TC ADD-ON: CPT | Performed by: INTERNAL MEDICINE

## 2020-09-30 PROCEDURE — 85007 BL SMEAR W/DIFF WBC COUNT: CPT | Performed by: INTERNAL MEDICINE

## 2020-09-30 PROCEDURE — 88184 FLOWCYTOMETRY/ TC 1 MARKER: CPT | Performed by: INTERNAL MEDICINE

## 2020-09-30 PROCEDURE — 85046 RETICYTE/HGB CONCENTRATE: CPT | Performed by: INTERNAL MEDICINE

## 2020-09-30 PROCEDURE — 88182 CELL MARKER STUDY: CPT

## 2020-09-30 PROCEDURE — 83921 ORGANIC ACID SINGLE QUANT: CPT | Performed by: INTERNAL MEDICINE

## 2020-09-30 PROCEDURE — 83540 ASSAY OF IRON: CPT | Performed by: INTERNAL MEDICINE

## 2020-09-30 PROCEDURE — 82607 VITAMIN B-12: CPT | Performed by: INTERNAL MEDICINE

## 2020-09-30 RX ORDER — FERROUS SULFATE 325(65) MG
325 TABLET ORAL DAILY
Start: 2020-09-30 | End: 2021-01-06

## 2020-09-30 RX ORDER — ESOMEPRAZOLE MAGNESIUM 40 MG/1
40 CAPSULE, DELAYED RELEASE ORAL
Qty: 30 CAPSULE | Refills: 1 | Status: SHIPPED | OUTPATIENT
Start: 2020-09-30 | End: 2020-10-21

## 2020-09-30 RX ORDER — PREDNISONE 20 MG/1
60 TABLET ORAL DAILY
Qty: 90 TABLET | Refills: 0 | Status: SHIPPED | OUTPATIENT
Start: 2020-09-30 | End: 2020-10-14

## 2020-09-30 NOTE — PROGRESS NOTES
Subjective     REASON FOR CONSULTATION:  Provide an opinion on any further workup or treatment on:    Thrombocytopenia                       REQUESTING PHYSICIAN: Vito Adame MD    RECORDS OBTAINED: Records of the patients history including those obtained from the referring provider were reviewed and summarized in detail.    HISTORY OF PRESENT ILLNESS:    Karly Mcgrath is a 75 y.o. patient who was referred for evaluation of thrombocytopenia.  It was found to have thrombocytopenia with a platelet count 119,000 on 8/20/2019.  She saw her primary care physician on 9/10/2020 and a CBC was obtained.  It revealed a hemoglobin of 11.9.  Platelets were down to 44,000.  She was not having problems with bleeding or bruising.  She was referred to our office for further evaluation of this.    Patient denies having any recent infections.  Specifically, no recent upper respiratory or bronchial infection.  She did not start any medicine recently.    Patient has bronchiectasis.  She follows with pulmonary.        REVIEW OF SYSTEMS:  Review of Systems   Constitutional: Negative for fatigue, fever and unexpected weight change.   HENT: Positive for hearing loss. Negative for nosebleeds and voice change.    Eyes: Negative for visual disturbance.   Respiratory: Negative for cough and shortness of breath.    Cardiovascular: Negative for chest pain and leg swelling.   Gastrointestinal: Negative for abdominal pain, blood in stool, constipation, diarrhea, nausea and vomiting.   Genitourinary: Negative for frequency and hematuria.   Musculoskeletal: Negative for back pain and joint swelling.   Skin: Negative for rash.   Neurological: Positive for dizziness. Negative for headaches.   Hematological: Negative for adenopathy. Does not bruise/bleed easily.   Psychiatric/Behavioral: Negative for dysphoric mood. The patient is not nervous/anxious.        The Review Of Systems was completed by the patient (new patient health questionnaire),  entered into the record by the MA and verified by me.     Past Medical History:   Diagnosis Date   • Anxiety    • Basal cell carcinoma    • Disease of thyroid gland    • Hyperlipidemia    • Hypertension    • Maxillary sinusitis 10/08/2015    Resolved   • Pneumonia        Past Surgical History:   Procedure Laterality Date   • APPENDECTOMY     • BRONCHOSCOPY      6-7 YEARS AGO   • BRONCHOSCOPY N/A 9/18/2018    Procedure: BRONCHOSCOPY WITH BAL;  Surgeon: Josué Espinosa MD;  Location: [facility] ENDOSCOPY;  Service: Pulmonary   • COLONOSCOPY     • TUBAL ABDOMINAL LIGATION     • WRIST SURGERY         Social History     Socioeconomic History   • Marital status:      Spouse name: Don   • Number of children: Not on file   • Years of education: Not on file   • Highest education level: Not on file   Occupational History     Employer: RETIRED   Tobacco Use   • Smoking status: Never Smoker   • Smokeless tobacco: Never Used   Substance and Sexual Activity   • Alcohol use: No   • Drug use: No   • Sexual activity: Defer       Family History   Problem Relation Age of Onset   • Heart attack Mother         Acute Myocardial Infarction   • Heart failure Mother         Congestive Heart Failure   • Hypertension Mother    • Coronary artery disease Brother         CABG   • Heart attack Father    • Breast cancer Daughter         MEDICATIONS:    Current Outpatient Medications:   •  atorvastatin (LIPITOR) 10 MG tablet, Take 1 tablet by mouth Daily., Disp: 90 tablet, Rfl: 3  •  busPIRone (BUSPAR) 5 MG tablet, TAKE 1 TABLET BY MOUTH TWICE DAILY FOR ANXIETY, Disp: 180 tablet, Rfl: 1  •  Calcium Carb-Cholecalciferol (CALCIUM 600 + D) 600-200 MG-UNIT tablet, Take 2,000 mg by mouth 2 (Two) Times a Day., Disp: , Rfl:   •  cholecalciferol (VITAMIN D3) 1000 units tablet, Take 2,000 Units by mouth Daily., Disp: , Rfl:   •  Cyanocobalamin (VITAMIN B 12 PO), Take 1,000 mcg by mouth Daily., Disp: , Rfl:   •  levothyroxine (Synthroid) 50 MCG  "tablet, Take 1 tablet by mouth Daily., Disp: 30 tablet, Rfl: 1  •  losartan (COZAAR) 50 MG tablet, TAKE ONE TABLET BY MOUTH DAILY, Disp: 90 tablet, Rfl: 0  •  metoprolol succinate XL (TOPROL-XL) 50 MG 24 hr tablet, Take 1 tablet by mouth Daily., Disp: 90 tablet, Rfl: 3  •  acyclovir (ZOVIRAX) 200 MG capsule, Take 1 capsule by mouth 5 (Five) Times a Day. (Patient taking differently: Take 200 mg by mouth 5 (Five) Times a Day As Needed (cold sores).), Disp: 25 capsule, Rfl: 2  •  acyclovir (ZOVIRAX) 5 % ointment, Apply  topically to the appropriate area as directed 5 (Five) Times a Day As Needed (cold sore)., Disp: 15 g, Rfl: 1    Current Facility-Administered Medications:   •  cyanocobalamin injection 1,000 mcg, 1,000 mcg, Intramuscular, Weekly, Vito Adame MD, 1,000 mcg at 03/09/20 1041     ALLERGIES:  Allergies   Allergen Reactions   • Eggs Or Egg-Derived Products Other (See Comments)     PATIENT STATES AN ALLERGY TEST SHOWED THIS ALLERGY. PATIENT STATES SHE EATS EGGS AND HAS NEVER HAD PROBLEMS.   • Sulfa Antibiotics Unknown (See Comments)     PATIENT CANNOT RECALL REACTION.        Objective   VITAL SIGNS:  Vitals:    09/30/20 1013   BP: 177/73   Pulse: 72   Resp: 14   Temp: 97.8 °F (36.6 °C)   TempSrc: Temporal   SpO2: 99%   Weight: 60.7 kg (133 lb 12.8 oz)   Height: 164 cm (64.57\")  Comment: new ht   PainSc: 0-No pain       Wt Readings from Last 3 Encounters:   09/30/20 60.7 kg (133 lb 12.8 oz)   09/10/20 60.2 kg (132 lb 12.8 oz)   07/31/20 59 kg (130 lb)       PHYSICAL EXAMINATION  GENERAL:  The patient appears in good general condition, not in acute distress.  SKIN: No skin rashes. No ecchymosis.  Petechiae were identified in the lower extremities.  HEAD:  Normocephalic.  EYES:  No Jaundice. No Pallor. Pupils equal. EOMI.  MOUTH: No purpuric lesions in the mouth.  NECK:  Supple with Good ROM. No Thyromegaly. No Masses.  LYMPHATICS:  No cervical or supraclavicular or axillary lymphadenopathy.  CHEST: Normal " respiratory effort. Lungs clear to auscultation.   CARDIAC:  Normal S1 & S2. No murmur. No edema.  ABDOMEN:  Soft. No tenderness. No Hepatomegaly. No Splenomegaly. No masses.  EXTREMITIES:  No clubbing. No deforming arthritis in the hands. No Calf tenderness.  NEUROLOGICAL:  No Focal neurological deficits.       RESULT REVIEW:   Results from last 7 days   Lab Units 09/30/20  0953   WBC 10*3/mm3 6.72   NEUTROS ABS 10*3/mm3 4.54   HEMOGLOBIN g/dL 11.6*   HEMATOCRIT % 34.0   PLATELETS 10*3/mm3 11*     I reviewed the peripheral blood smear from today.  Platelets are large in size and markedly reduced in number.  Giant platelets identified.  RBCs are microcytic hypochromic.  WBCs have normal morphology.  No premature WBCs were identified.    Component      Latest Ref Rng & Units 11/19/2018 8/28/2019 3/9/2020 9/10/2020   Platelets      140 - 450 10*3/mm3 182 119 (L) 144 44 (LL)         Assessment/Plan   *Thrombocytopenia.  Patient had acute severe thrombocytopenia with a platelet count of 11,000.  Thrombocytopenia dates back to 8/28/2019 when her platelet count dropped 119,000.  It decreased to 44,000 on 9/10/2020 and decreased to 11,000 today.    Reviewed the peripheral blood smear revealed large platelets.  Immature platelet fraction is elevated.  The picture is most consistent with severe acute immune thrombocytopenia.    I explained to the condition to the patient.  I recommended treatment with steroids.  I recommended prednisone 1 mg/kg to be started today.  I explained to the patient importance of stopping treatment today to increase the platelet count.  I explained that platelet count below 10,000 requires platelet contusion to rapidly improve the platelet counts.    *Hypochromic microcytic anemia.  · This is likely representing underlying iron deficiency anemia.  · Ferritin, iron panel, B12 and folate levels were obtained and the patient has iron deficiency.  B12 and folate are normal.    PLAN:    1.  Obtain  peripheral blood flow cytometry  2.  Obtain methylmalonic acid level.  3.  Start prednisone 60 mg daily.  4.  Start Nexium 40 mg daily for GI prophylaxis.  5.  Start ferrous sulfate 325 mg once daily.  6.  If the patient develops difficulty with sleep, I asked her to take Benadryl.  7.  Repeat CBC IPF on 10/4/2020 with RN review.  8.  Follow-up in 2 weeks with repeat CBC IPF.        Garcia Muro MD  09/30/20

## 2020-10-01 ENCOUNTER — TELEPHONE (OUTPATIENT)
Dept: ONCOLOGY | Facility: CLINIC | Age: 75
End: 2020-10-01

## 2020-10-01 NOTE — TELEPHONE ENCOUNTER
Left message for the patient to call the office back.    ----- Message from Garcia Muro MD sent at 9/30/2020 10:18 PM EDT -----  Please inform the patient that the lab test showed iron deficiency. Please ask the patient to start over the counter iron 325 mg a day.     Thank you.

## 2020-10-04 LAB
Lab: NORMAL
METHYLMALONATE SERPL-SCNC: 197 NMOL/L (ref 0–378)

## 2020-10-05 ENCOUNTER — CLINICAL SUPPORT (OUTPATIENT)
Dept: ONCOLOGY | Facility: HOSPITAL | Age: 75
End: 2020-10-05

## 2020-10-05 ENCOUNTER — LAB (OUTPATIENT)
Dept: OTHER | Facility: HOSPITAL | Age: 75
End: 2020-10-05

## 2020-10-05 DIAGNOSIS — D69.6 THROMBOCYTOPENIA (HCC): ICD-10-CM

## 2020-10-05 DIAGNOSIS — D50.9 MICROCYTIC ANEMIA: ICD-10-CM

## 2020-10-05 LAB
ACANTHOCYTES BLD QL SMEAR: NORMAL
BASOPHILS # BLD AUTO: 0.05 10*3/MM3 (ref 0–0.2)
BASOPHILS NFR BLD AUTO: 0.4 % (ref 0–1.5)
DEPRECATED RDW RBC AUTO: 42.7 FL (ref 37–54)
EOSINOPHIL # BLD AUTO: 0.02 10*3/MM3 (ref 0–0.4)
EOSINOPHIL NFR BLD AUTO: 0.1 % (ref 0.3–6.2)
ERYTHROCYTE [DISTWIDTH] IN BLOOD BY AUTOMATED COUNT: 15.8 % (ref 12.3–15.4)
HCT VFR BLD AUTO: 36.2 % (ref 34–46.6)
HGB BLD-MCNC: 11.9 G/DL (ref 12–15.9)
IMM GRANULOCYTES # BLD AUTO: 0.15 10*3/MM3 (ref 0–0.05)
IMM GRANULOCYTES NFR BLD AUTO: 1.1 % (ref 0–0.5)
LYMPHOCYTES # BLD AUTO: 4.15 10*3/MM3 (ref 0.7–3.1)
LYMPHOCYTES NFR BLD AUTO: 29.2 % (ref 19.6–45.3)
MCH RBC QN AUTO: 25.5 PG (ref 26.6–33)
MCHC RBC AUTO-ENTMCNC: 32.9 G/DL (ref 31.5–35.7)
MCV RBC AUTO: 77.7 FL (ref 79–97)
MONOCYTES # BLD AUTO: 1.42 10*3/MM3 (ref 0.1–0.9)
MONOCYTES NFR BLD AUTO: 10 % (ref 5–12)
NEUTROPHILS NFR BLD AUTO: 59.2 % (ref 42.7–76)
NEUTROPHILS NFR BLD AUTO: 8.43 10*3/MM3 (ref 1.7–7)
NRBC BLD AUTO-RTO: 0 /100 WBC (ref 0–0.2)
OVALOCYTES BLD QL SMEAR: NORMAL
PLAT MORPH BLD: NORMAL
PLATELET # BLD AUTO: 61 10*3/MM3 (ref 140–450)
PLATELET # BLD AUTO: 61 10*3/MM3 (ref 140–450)
PLATELETS.RETICULATED NFR BLD AUTO: 18.4 % (ref 0.9–6.5)
RBC # BLD AUTO: 4.66 10*6/MM3 (ref 3.77–5.28)
WBC # BLD AUTO: 14.22 10*3/MM3 (ref 3.4–10.8)
WBC MORPH BLD: NORMAL

## 2020-10-05 PROCEDURE — 85007 BL SMEAR W/DIFF WBC COUNT: CPT | Performed by: INTERNAL MEDICINE

## 2020-10-05 PROCEDURE — 85025 COMPLETE CBC W/AUTO DIFF WBC: CPT | Performed by: INTERNAL MEDICINE

## 2020-10-05 PROCEDURE — 85055 RETICULATED PLATELET ASSAY: CPT | Performed by: INTERNAL MEDICINE

## 2020-10-05 PROCEDURE — 36415 COLL VENOUS BLD VENIPUNCTURE: CPT

## 2020-10-05 PROCEDURE — G0463 HOSPITAL OUTPT CLINIC VISIT: HCPCS

## 2020-10-05 NOTE — NURSING NOTE
Pt is here for lab with RN review.  CBC reviewed with pt, counts are stable for this pt at this time. Pt has no complaints. Medications verified and pt stated she is taking the prednisone and po iron as prescribed and tolerating well. Pt did state she was unable to get her Nexium medication. Verified and called the pharmacy and spoke with Alexys. He stated pre-authorization was needed. Spoke with Sherie Kaufman who indicated she would get the pre-auth and follow up with the pharmacy. Relayed this information to the patient. Copy of labs given to pt and f/u appt reviewed. Pt is instructed to call the office with any concerns or new symptoms prior to next visit. Pt vu and discharged ambulatory.  Lab Results   Component Value Date    WBC 14.22 (H) 10/05/2020    HGB 11.9 (L) 10/05/2020    HCT 36.2 10/05/2020    MCV 77.7 (L) 10/05/2020    PLT 61 (L) 10/05/2020    PLT 61 (L) 10/05/2020

## 2020-10-13 LAB — REF LAB TEST METHOD: NORMAL

## 2020-10-14 ENCOUNTER — LAB (OUTPATIENT)
Dept: OTHER | Facility: HOSPITAL | Age: 75
End: 2020-10-14

## 2020-10-14 ENCOUNTER — OFFICE VISIT (OUTPATIENT)
Dept: ONCOLOGY | Facility: CLINIC | Age: 75
End: 2020-10-14

## 2020-10-14 VITALS
SYSTOLIC BLOOD PRESSURE: 174 MMHG | RESPIRATION RATE: 16 BRPM | OXYGEN SATURATION: 99 % | HEIGHT: 65 IN | BODY MASS INDEX: 22.44 KG/M2 | TEMPERATURE: 97.3 F | HEART RATE: 67 BPM | WEIGHT: 134.7 LBS | DIASTOLIC BLOOD PRESSURE: 83 MMHG

## 2020-10-14 DIAGNOSIS — D69.6 THROMBOCYTOPENIA (HCC): ICD-10-CM

## 2020-10-14 DIAGNOSIS — D50.9 IRON DEFICIENCY ANEMIA, UNSPECIFIED IRON DEFICIENCY ANEMIA TYPE: ICD-10-CM

## 2020-10-14 DIAGNOSIS — D69.3 IMMUNE THROMBOCYTOPENIC PURPURA (HCC): Primary | ICD-10-CM

## 2020-10-14 DIAGNOSIS — D50.9 MICROCYTIC ANEMIA: ICD-10-CM

## 2020-10-14 LAB
BASOPHILS # BLD AUTO: 0.04 10*3/MM3 (ref 0–0.2)
BASOPHILS NFR BLD AUTO: 0.2 % (ref 0–1.5)
DEPRECATED RDW RBC AUTO: 50 FL (ref 37–54)
EOSINOPHIL # BLD AUTO: 0 10*3/MM3 (ref 0–0.4)
EOSINOPHIL NFR BLD AUTO: 0 % (ref 0.3–6.2)
ERYTHROCYTE [DISTWIDTH] IN BLOOD BY AUTOMATED COUNT: 18.3 % (ref 12.3–15.4)
HCT VFR BLD AUTO: 40.2 % (ref 34–46.6)
HGB BLD-MCNC: 13.6 G/DL (ref 12–15.9)
IMM GRANULOCYTES # BLD AUTO: 0.47 10*3/MM3 (ref 0–0.05)
IMM GRANULOCYTES NFR BLD AUTO: 2.9 % (ref 0–0.5)
LYMPHOCYTES # BLD AUTO: 1.47 10*3/MM3 (ref 0.7–3.1)
LYMPHOCYTES NFR BLD AUTO: 8.9 % (ref 19.6–45.3)
MCH RBC QN AUTO: 26.7 PG (ref 26.6–33)
MCHC RBC AUTO-ENTMCNC: 33.8 G/DL (ref 31.5–35.7)
MCV RBC AUTO: 78.8 FL (ref 79–97)
MONOCYTES # BLD AUTO: 0.48 10*3/MM3 (ref 0.1–0.9)
MONOCYTES NFR BLD AUTO: 2.9 % (ref 5–12)
NEUTROPHILS NFR BLD AUTO: 14.01 10*3/MM3 (ref 1.7–7)
NEUTROPHILS NFR BLD AUTO: 85.1 % (ref 42.7–76)
NRBC BLD AUTO-RTO: 0 /100 WBC (ref 0–0.2)
PLAT MORPH BLD: NORMAL
PLATELET # BLD AUTO: 160 10*3/MM3 (ref 140–450)
PLATELET # BLD AUTO: 160 10*3/MM3 (ref 140–450)
PLATELETS.RETICULATED NFR BLD AUTO: 5.7 % (ref 0.9–6.5)
PMV BLD AUTO: 11.4 FL (ref 6–12)
POIKILOCYTOSIS BLD QL SMEAR: NORMAL
RBC # BLD AUTO: 5.1 10*6/MM3 (ref 3.77–5.28)
WBC # BLD AUTO: 16.47 10*3/MM3 (ref 3.4–10.8)
WBC MORPH BLD: NORMAL

## 2020-10-14 PROCEDURE — 36415 COLL VENOUS BLD VENIPUNCTURE: CPT

## 2020-10-14 PROCEDURE — 85007 BL SMEAR W/DIFF WBC COUNT: CPT | Performed by: INTERNAL MEDICINE

## 2020-10-14 PROCEDURE — 85025 COMPLETE CBC W/AUTO DIFF WBC: CPT | Performed by: INTERNAL MEDICINE

## 2020-10-14 PROCEDURE — 99214 OFFICE O/P EST MOD 30 MIN: CPT | Performed by: INTERNAL MEDICINE

## 2020-10-14 PROCEDURE — 85055 RETICULATED PLATELET ASSAY: CPT | Performed by: INTERNAL MEDICINE

## 2020-10-14 RX ORDER — PREDNISONE 20 MG/1
40 TABLET ORAL DAILY
Start: 2020-10-14 | End: 2020-11-04 | Stop reason: SDUPTHER

## 2020-10-14 NOTE — PROGRESS NOTES
Subjective     CHIEF COMPLAINT:      Chief Complaint   Patient presents with   • Follow-up     no concerns       HISTORY OF PRESENT ILLNESS:     Karly Mcgrath is a 75 y.o. female patient who returns today for follow up on her thrombocytopenia and anemia.  She is on prednisone 60 mg daily.  She is tolerating it well.  She is tolerating the oral iron.  She is not having upset stomach.  She is not having problems with bleeding or bruising.    Patient reports intermittent mild headaches.    REVIEW OF SYSTEMS:  Review of Systems   Constitutional: Negative for fatigue, fever and unexpected weight change.   HENT: Negative for nosebleeds and voice change.    Eyes: Negative for visual disturbance.   Respiratory: Negative for cough and shortness of breath.    Cardiovascular: Negative for chest pain and leg swelling.   Gastrointestinal: Negative for abdominal pain, blood in stool, constipation, diarrhea, nausea and vomiting.   Genitourinary: Negative for frequency and hematuria.   Musculoskeletal: Negative for back pain and joint swelling.   Skin: Negative for rash.   Neurological: Positive for headaches. Negative for dizziness.   Hematological: Negative for adenopathy. Does not bruise/bleed easily.   Psychiatric/Behavioral: Negative for dysphoric mood. The patient is not nervous/anxious.      I reviewed and verified the CC and ROS obtained by the MA.     Past Medical History:   Diagnosis Date   • Anxiety    • Basal cell carcinoma    • Disease of thyroid gland    • Hyperlipidemia    • Hypertension    • Maxillary sinusitis 10/08/2015    Resolved   • Pneumonia        Past Surgical History:   Procedure Laterality Date   • APPENDECTOMY     • BRONCHOSCOPY      6-7 YEARS AGO   • BRONCHOSCOPY N/A 9/18/2018    Procedure: BRONCHOSCOPY WITH BAL;  Surgeon: Josué Espinosa MD;  Location: [facility] ENDOSCOPY;  Service: Pulmonary   • COLONOSCOPY     • TUBAL ABDOMINAL LIGATION     • WRIST SURGERY         Cancer-related family history  includes Breast cancer in her daughter.  Social History     Tobacco Use   • Smoking status: Never Smoker   • Smokeless tobacco: Never Used   Substance Use Topics   • Alcohol use: No       MEDICATIONS:    Current Outpatient Medications:   •  acyclovir (ZOVIRAX) 200 MG capsule, Take 1 capsule by mouth 5 (Five) Times a Day. (Patient taking differently: Take 200 mg by mouth 5 (Five) Times a Day As Needed (cold sores).), Disp: 25 capsule, Rfl: 2  •  acyclovir (ZOVIRAX) 5 % ointment, Apply  topically to the appropriate area as directed 5 (Five) Times a Day As Needed (cold sore)., Disp: 15 g, Rfl: 1  •  atorvastatin (LIPITOR) 10 MG tablet, Take 1 tablet by mouth Daily., Disp: 90 tablet, Rfl: 3  •  busPIRone (BUSPAR) 5 MG tablet, TAKE 1 TABLET BY MOUTH TWICE DAILY FOR ANXIETY, Disp: 180 tablet, Rfl: 1  •  Calcium Carb-Cholecalciferol (CALCIUM 600 + D) 600-200 MG-UNIT tablet, Take 2,000 mg by mouth 2 (Two) Times a Day., Disp: , Rfl:   •  cholecalciferol (VITAMIN D3) 1000 units tablet, Take 2,000 Units by mouth Daily., Disp: , Rfl:   •  Cyanocobalamin (VITAMIN B 12 PO), Take 1,000 mcg by mouth Daily., Disp: , Rfl:   •  esomeprazole (nexIUM) 40 MG capsule, Take 1 capsule by mouth Every Morning Before Breakfast., Disp: 30 capsule, Rfl: 1  •  ferrous sulfate 325 (65 FE) MG tablet, Take 1 tablet by mouth Daily., Disp: , Rfl:   •  levothyroxine (Synthroid) 50 MCG tablet, Take 1 tablet by mouth Daily., Disp: 30 tablet, Rfl: 1  •  losartan (COZAAR) 50 MG tablet, TAKE ONE TABLET BY MOUTH DAILY, Disp: 90 tablet, Rfl: 0  •  metoprolol succinate XL (TOPROL-XL) 50 MG 24 hr tablet, Take 1 tablet by mouth Daily., Disp: 90 tablet, Rfl: 3  •  predniSONE (DELTASONE) 20 MG tablet, Take 3 tablets by mouth Daily., Disp: 90 tablet, Rfl: 0    Current Facility-Administered Medications:   •  cyanocobalamin injection 1,000 mcg, 1,000 mcg, Intramuscular, Weekly, Vito Adame MD, 1,000 mcg at 03/09/20 1041    ALLERGIES:  Allergies   Allergen  "Reactions   • Eggs Or Egg-Derived Products Other (See Comments)     PATIENT STATES AN ALLERGY TEST SHOWED THIS ALLERGY. PATIENT STATES SHE EATS EGGS AND HAS NEVER HAD PROBLEMS.   • Sulfa Antibiotics Unknown (See Comments)     PATIENT CANNOT RECALL REACTION.         Objective   VITAL SIGNS:     Vitals:    10/14/20 1447   BP: 174/83   Pulse: 67   Resp: 16   Temp: 97.3 °F (36.3 °C)   TempSrc: Temporal   SpO2: 99%   Weight: 61.1 kg (134 lb 11.2 oz)   Height: 164 cm (64.57\")   PainSc: 0-No pain     Body mass index is 22.72 kg/m².     Wt Readings from Last 3 Encounters:   10/14/20 61.1 kg (134 lb 11.2 oz)   09/30/20 60.7 kg (133 lb 12.8 oz)   09/10/20 60.2 kg (132 lb 12.8 oz)       PHYSICAL EXAMINATION:  GENERAL:  The patient appears in good general condition, not in acute distress.  SKIN: No skin rashes. No ecchymosis.  The petechiae identified at the last visit were not identified on today's exam.  HEAD:  Normocephalic.  EYES:  No Jaundice. No Pallor. Pupils equal. EOMI.  NECK:  Supple with Good ROM. No Thyromegaly. No Masses.  LYMPHATICS:  No cervical or supraclavicular lymphadenopathy.  CHEST: Normal respiratory effort. Lungs clear to auscultation.   CARDIAC:  Normal S1 & S2. No murmur. No edema.  ABDOMEN:  Soft. No tenderness. No Hepatomegaly. No Splenomegaly. No masses.  EXTREMITIES:  No clubbing. No deforming arthritis in the hands. No Calf tenderness.  NEUROLOGICAL:  No Focal neurological deficits.       DIAGNOSTIC DATA:     Results from last 7 days   Lab Units 10/14/20  1438   WBC 10*3/mm3 16.47*   NEUTROS ABS 10*3/mm3 14.01*   HEMOGLOBIN g/dL 13.6   HEMATOCRIT % 40.2   PLATELETS 10*3/mm3 160  160     Peripheral blood flow cytometry on 9/30/2020 showed no evidence of leukemia or lymphoma.    Component      Latest Ref Rng & Units 9/30/2020   Iron      37 - 145 mcg/dL 38   Iron Saturation      20 - 50 % 9 (L)   Transferrin      200 - 360 mg/dL 297   TIBC      298 - 536 mcg/dL 443   Methylmalonic Acid      0 - 378 " nmol/L 197   DISCLAIMER       Comment   Vitamin B-12      211 - 946 pg/mL 1,461 (H)   Folate      4.78 - 24.20 ng/mL 8.35   Ferritin      13.00 - 150.00 ng/mL 14.70       Assessment/Plan   *Acute thrombocytopenia diagnosed on 9/30/2020.    · Patient has prior history of chronic thrombocytopenia.  Platelet count was 119,000 on 8/28/2019.   · Platelet count decreased to 44,000 on 9/10/2020.   · Platelet count decreased to 11,000 on 9/30/2020.   · Patient was started on prednisone 1 mg/kg daily on 9/30/2020.  She responded with improvement in the platelet count to 61,000 on 10/5/2020.    · Platelet count increased to 160,000 today.   · Patient tolerated the prednisone.  She was not able to get the Nexium that was prescribed due to denial by insurance company.   · No evidence of underlying leukemia or lymphoma on flow cytometry.    *Iron deficiency anemia.  · Hemoglobin was 11.1 on 9/10/2020.  · No evidence of B12 or folate deficiency.  · Patient was started on ferrous sulfate 325 mg daily.  · Hemoglobin improved to 13.6.    PLAN:    1.  Reduce prednisone to 40 mg daily.  2.  Return weekly for CBC with RN review.  If the platelet count remains normal, we will reduce prednisone by 10 mg every week.  3.  Continue ferrous sulfate 325 mg daily.  4.  I asked the patient to take Prilosec OTC/omeprazole if she develops upset stomach from taking prednisone.  5.  Follow-up in 4 weeks with repeat CBC ferritin iron panel.        Garcia Muro MD  10/14/20

## 2020-10-15 ENCOUNTER — DOCUMENTATION (OUTPATIENT)
Dept: ONCOLOGY | Facility: CLINIC | Age: 75
End: 2020-10-15

## 2020-10-21 ENCOUNTER — LAB (OUTPATIENT)
Dept: OTHER | Facility: HOSPITAL | Age: 75
End: 2020-10-21

## 2020-10-21 ENCOUNTER — CLINICAL SUPPORT (OUTPATIENT)
Dept: ONCOLOGY | Facility: HOSPITAL | Age: 75
End: 2020-10-21

## 2020-10-21 VITALS — HEIGHT: 65 IN | TEMPERATURE: 97.3 F | RESPIRATION RATE: 18 BRPM | BODY MASS INDEX: 22.89 KG/M2 | WEIGHT: 137.4 LBS

## 2020-10-21 DIAGNOSIS — D69.3 IMMUNE THROMBOCYTOPENIC PURPURA (HCC): ICD-10-CM

## 2020-10-21 DIAGNOSIS — D50.9 IRON DEFICIENCY ANEMIA, UNSPECIFIED IRON DEFICIENCY ANEMIA TYPE: ICD-10-CM

## 2020-10-21 LAB
ACANTHOCYTES BLD QL SMEAR: NORMAL
BASOPHILS # BLD AUTO: 0.03 10*3/MM3 (ref 0–0.2)
BASOPHILS NFR BLD AUTO: 0.2 % (ref 0–1.5)
DEPRECATED RDW RBC AUTO: 55.3 FL (ref 37–54)
EOSINOPHIL # BLD AUTO: 0.06 10*3/MM3 (ref 0–0.4)
EOSINOPHIL NFR BLD AUTO: 0.3 % (ref 0.3–6.2)
ERYTHROCYTE [DISTWIDTH] IN BLOOD BY AUTOMATED COUNT: 19.4 % (ref 12.3–15.4)
HCT VFR BLD AUTO: 39.9 % (ref 34–46.6)
HGB BLD-MCNC: 13.4 G/DL (ref 12–15.9)
IMM GRANULOCYTES # BLD AUTO: 0.24 10*3/MM3 (ref 0–0.05)
IMM GRANULOCYTES NFR BLD AUTO: 1.2 % (ref 0–0.5)
LYMPHOCYTES # BLD AUTO: 4.23 10*3/MM3 (ref 0.7–3.1)
LYMPHOCYTES NFR BLD AUTO: 21.6 % (ref 19.6–45.3)
MCH RBC QN AUTO: 27 PG (ref 26.6–33)
MCHC RBC AUTO-ENTMCNC: 33.6 G/DL (ref 31.5–35.7)
MCV RBC AUTO: 80.3 FL (ref 79–97)
MONOCYTES # BLD AUTO: 1.33 10*3/MM3 (ref 0.1–0.9)
MONOCYTES NFR BLD AUTO: 6.8 % (ref 5–12)
NEUTROPHILS NFR BLD AUTO: 13.69 10*3/MM3 (ref 1.7–7)
NEUTROPHILS NFR BLD AUTO: 69.9 % (ref 42.7–76)
NRBC BLD AUTO-RTO: 0 /100 WBC (ref 0–0.2)
OVALOCYTES BLD QL SMEAR: NORMAL
PLAT MORPH BLD: NORMAL
PLATELET # BLD AUTO: 144 10*3/MM3 (ref 140–450)
PMV BLD AUTO: 11.5 FL (ref 6–12)
RBC # BLD AUTO: 4.97 10*6/MM3 (ref 3.77–5.28)
WBC # BLD AUTO: 19.58 10*3/MM3 (ref 3.4–10.8)
WBC MORPH BLD: NORMAL

## 2020-10-21 PROCEDURE — 85025 COMPLETE CBC W/AUTO DIFF WBC: CPT | Performed by: INTERNAL MEDICINE

## 2020-10-21 PROCEDURE — 36415 COLL VENOUS BLD VENIPUNCTURE: CPT

## 2020-10-21 PROCEDURE — 85007 BL SMEAR W/DIFF WBC COUNT: CPT | Performed by: INTERNAL MEDICINE

## 2020-10-21 PROCEDURE — G0463 HOSPITAL OUTPT CLINIC VISIT: HCPCS

## 2020-10-21 RX ORDER — OMEPRAZOLE 40 MG/1
40 CAPSULE, DELAYED RELEASE ORAL AS NEEDED
COMMUNITY
End: 2022-10-31

## 2020-10-21 NOTE — NURSING NOTE
Spoke with Peggy ZENG regarding patient labs and reducing the Prednisone from 40 mg PO daily. Peggy ZENG stated to let the patient reduce to Prednisone 30 mg PO daily and keep the next appointment to see how this affects the platelet count. [facility] of [phone] which is the number the patient gave me to leave the message on.

## 2020-10-27 ENCOUNTER — TELEPHONE (OUTPATIENT)
Dept: ONCOLOGY | Facility: CLINIC | Age: 75
End: 2020-10-27

## 2020-10-27 NOTE — TELEPHONE ENCOUNTER
Caller: DARRIN SINGH    Relationship to patient: SELF    Best call back number: [phone]    PT RECEIVED A MESSAGE TELLING HER TO REDUCE HER DOSAGE OF PREDNISONE. PT DID NOT SEE THE MESSAGE UNTIL TODAY 10/27 AND IS ASKING IF SHE SHOULD GO AHEAD AND START TAKING THE LOWER DOSAGE OR CONTINUE WITH HER PREVIOUS DOSAGE UNTIL HER APPT TOMORROW 10/28

## 2020-10-27 NOTE — TELEPHONE ENCOUNTER
PT CALLING ABOUT PREDNISONE DOSE. PT DIDN'T GET MESSAGE TO REDUCE TO 30MG DAILY. PT HAS BEEN TAKING 40MG DAILY. HAS APPT TOMORROW TO CHECK COUNTS. PER JL NP OK FOR PT TO TAKE 40MG TODAY AND COME IN TOMORROW AND GO FROM THERE. PT MADE AWARE AND SHE V/U.

## 2020-10-28 ENCOUNTER — APPOINTMENT (OUTPATIENT)
Dept: ONCOLOGY | Facility: HOSPITAL | Age: 75
End: 2020-10-28

## 2020-10-28 ENCOUNTER — CLINICAL SUPPORT (OUTPATIENT)
Dept: ONCOLOGY | Facility: HOSPITAL | Age: 75
End: 2020-10-28

## 2020-10-28 ENCOUNTER — LAB (OUTPATIENT)
Dept: OTHER | Facility: HOSPITAL | Age: 75
End: 2020-10-28

## 2020-10-28 VITALS
SYSTOLIC BLOOD PRESSURE: 168 MMHG | BODY MASS INDEX: 22.49 KG/M2 | RESPIRATION RATE: 18 BRPM | DIASTOLIC BLOOD PRESSURE: 73 MMHG | OXYGEN SATURATION: 98 % | HEART RATE: 71 BPM | HEIGHT: 65 IN | WEIGHT: 135 LBS | TEMPERATURE: 97.7 F

## 2020-10-28 DIAGNOSIS — D69.3 IMMUNE THROMBOCYTOPENIC PURPURA (HCC): ICD-10-CM

## 2020-10-28 DIAGNOSIS — D50.9 IRON DEFICIENCY ANEMIA, UNSPECIFIED IRON DEFICIENCY ANEMIA TYPE: ICD-10-CM

## 2020-10-28 LAB
ACANTHOCYTES BLD QL SMEAR: NORMAL
BASOPHILS # BLD AUTO: 0.03 10*3/MM3 (ref 0–0.2)
BASOPHILS NFR BLD AUTO: 0.2 % (ref 0–1.5)
DEPRECATED RDW RBC AUTO: 56.8 FL (ref 37–54)
EOSINOPHIL # BLD AUTO: 0.13 10*3/MM3 (ref 0–0.4)
EOSINOPHIL NFR BLD AUTO: 0.9 % (ref 0.3–6.2)
ERYTHROCYTE [DISTWIDTH] IN BLOOD BY AUTOMATED COUNT: 19.9 % (ref 12.3–15.4)
HCT VFR BLD AUTO: 39.8 % (ref 34–46.6)
HGB BLD-MCNC: 13.6 G/DL (ref 12–15.9)
IMM GRANULOCYTES # BLD AUTO: 0.25 10*3/MM3 (ref 0–0.05)
IMM GRANULOCYTES NFR BLD AUTO: 1.8 % (ref 0–0.5)
LYMPHOCYTES # BLD AUTO: 2.51 10*3/MM3 (ref 0.7–3.1)
LYMPHOCYTES NFR BLD AUTO: 17.8 % (ref 19.6–45.3)
MCH RBC QN AUTO: 27.6 PG (ref 26.6–33)
MCHC RBC AUTO-ENTMCNC: 34.2 G/DL (ref 31.5–35.7)
MCV RBC AUTO: 80.7 FL (ref 79–97)
MONOCYTES # BLD AUTO: 0.87 10*3/MM3 (ref 0.1–0.9)
MONOCYTES NFR BLD AUTO: 6.2 % (ref 5–12)
NEUTROPHILS NFR BLD AUTO: 10.34 10*3/MM3 (ref 1.7–7)
NEUTROPHILS NFR BLD AUTO: 73.1 % (ref 42.7–76)
NRBC BLD AUTO-RTO: 0 /100 WBC (ref 0–0.2)
OVALOCYTES BLD QL SMEAR: NORMAL
PLAT MORPH BLD: NORMAL
PLATELET # BLD AUTO: 124 10*3/MM3 (ref 140–450)
PMV BLD AUTO: 10.7 FL (ref 6–12)
RBC # BLD AUTO: 4.93 10*6/MM3 (ref 3.77–5.28)
WBC # BLD AUTO: 14.13 10*3/MM3 (ref 3.4–10.8)
WBC MORPH BLD: NORMAL

## 2020-10-28 PROCEDURE — 85007 BL SMEAR W/DIFF WBC COUNT: CPT | Performed by: INTERNAL MEDICINE

## 2020-10-28 PROCEDURE — 85025 COMPLETE CBC W/AUTO DIFF WBC: CPT | Performed by: INTERNAL MEDICINE

## 2020-10-28 PROCEDURE — G0463 HOSPITAL OUTPT CLINIC VISIT: HCPCS

## 2020-10-28 PROCEDURE — 36415 COLL VENOUS BLD VENIPUNCTURE: CPT

## 2020-10-28 NOTE — NURSING NOTE
Pt is here for lab with RN review.  CBC reviewed with pt, counts are stable for this pt at this time.Labs reviewed with Dr Muro and patient will remain on Prednisone 40 mg daily until lab check next week  Pt has no complaints.  Copy of labs given to pt and f/u appt reviewed. Pt is instructed to call the office with any concerns or new symptoms prior to next visit. Pt vu

## 2020-10-30 RX ORDER — BUSPIRONE HYDROCHLORIDE 5 MG/1
TABLET ORAL
Qty: 180 TABLET | Refills: 2 | Status: SHIPPED | OUTPATIENT
Start: 2020-10-30 | End: 2021-08-09

## 2020-11-04 ENCOUNTER — CLINICAL SUPPORT (OUTPATIENT)
Dept: ONCOLOGY | Facility: HOSPITAL | Age: 75
End: 2020-11-04

## 2020-11-04 ENCOUNTER — LAB (OUTPATIENT)
Dept: OTHER | Facility: HOSPITAL | Age: 75
End: 2020-11-04

## 2020-11-04 VITALS — TEMPERATURE: 97.8 F | SYSTOLIC BLOOD PRESSURE: 168 MMHG | DIASTOLIC BLOOD PRESSURE: 82 MMHG

## 2020-11-04 DIAGNOSIS — D50.9 IRON DEFICIENCY ANEMIA, UNSPECIFIED IRON DEFICIENCY ANEMIA TYPE: ICD-10-CM

## 2020-11-04 DIAGNOSIS — D69.3 IMMUNE THROMBOCYTOPENIC PURPURA (HCC): ICD-10-CM

## 2020-11-04 LAB
BASOPHILS # BLD AUTO: 0.06 10*3/MM3 (ref 0–0.2)
BASOPHILS NFR BLD AUTO: 0.4 % (ref 0–1.5)
DEPRECATED RDW RBC AUTO: 57.8 FL (ref 37–54)
EOSINOPHIL # BLD AUTO: 0.1 10*3/MM3 (ref 0–0.4)
EOSINOPHIL NFR BLD AUTO: 0.7 % (ref 0.3–6.2)
ERYTHROCYTE [DISTWIDTH] IN BLOOD BY AUTOMATED COUNT: 19.9 % (ref 12.3–15.4)
HCT VFR BLD AUTO: 42.1 % (ref 34–46.6)
HGB BLD-MCNC: 14.5 G/DL (ref 12–15.9)
IMM GRANULOCYTES # BLD AUTO: 0.45 10*3/MM3 (ref 0–0.05)
IMM GRANULOCYTES NFR BLD AUTO: 3.1 % (ref 0–0.5)
LYMPHOCYTES # BLD AUTO: 2.57 10*3/MM3 (ref 0.7–3.1)
LYMPHOCYTES NFR BLD AUTO: 17.8 % (ref 19.6–45.3)
MCH RBC QN AUTO: 28 PG (ref 26.6–33)
MCHC RBC AUTO-ENTMCNC: 34.4 G/DL (ref 31.5–35.7)
MCV RBC AUTO: 81.4 FL (ref 79–97)
MONOCYTES # BLD AUTO: 0.97 10*3/MM3 (ref 0.1–0.9)
MONOCYTES NFR BLD AUTO: 6.7 % (ref 5–12)
NEUTROPHILS NFR BLD AUTO: 10.29 10*3/MM3 (ref 1.7–7)
NEUTROPHILS NFR BLD AUTO: 71.3 % (ref 42.7–76)
NRBC BLD AUTO-RTO: 0 /100 WBC (ref 0–0.2)
PLAT MORPH BLD: NORMAL
PLATELET # BLD AUTO: 170 10*3/MM3 (ref 140–450)
PMV BLD AUTO: 10.7 FL (ref 6–12)
RBC # BLD AUTO: 5.17 10*6/MM3 (ref 3.77–5.28)
RBC MORPH BLD: NORMAL
WBC # BLD AUTO: 14.44 10*3/MM3 (ref 3.4–10.8)
WBC MORPH BLD: NORMAL

## 2020-11-04 PROCEDURE — G0463 HOSPITAL OUTPT CLINIC VISIT: HCPCS

## 2020-11-04 PROCEDURE — 85025 COMPLETE CBC W/AUTO DIFF WBC: CPT | Performed by: INTERNAL MEDICINE

## 2020-11-04 PROCEDURE — 85007 BL SMEAR W/DIFF WBC COUNT: CPT | Performed by: INTERNAL MEDICINE

## 2020-11-04 PROCEDURE — 36415 COLL VENOUS BLD VENIPUNCTURE: CPT

## 2020-11-04 RX ORDER — PREDNISONE 10 MG/1
30 TABLET ORAL DAILY
Status: CANCELLED
Start: 2020-11-04

## 2020-11-04 RX ORDER — PREDNISONE 10 MG/1
30 TABLET ORAL DAILY
Qty: 90 TABLET | Refills: 1 | Status: SHIPPED | OUTPATIENT
Start: 2020-11-04 | End: 2020-11-11

## 2020-11-04 NOTE — NURSING NOTE
Pt is here for lab with RN review.  CBC reviewed with pt, counts are stable for this pt at this time. Pt has no complaints. Spoke with Dr. Muro as pt's platelet count remains stable at 170. Per Dr. Muro pt to start taking 30mg prednisone daily and will refill with pt's pharmacy. Copy of labs given to pt and f/u appt reviewed. Pt is instructed to call the office with any concerns or new symptoms prior to next visit. Pt vu.  Lab Results   Component Value Date    WBC 14.44 (H) 11/04/2020    HGB 14.5 11/04/2020    HCT 42.1 11/04/2020    MCV 81.4 11/04/2020     11/04/2020

## 2020-11-10 DIAGNOSIS — E03.9 HYPOTHYROIDISM, UNSPECIFIED TYPE: ICD-10-CM

## 2020-11-10 RX ORDER — LEVOTHYROXINE SODIUM 0.05 MG/1
TABLET ORAL
Qty: 30 TABLET | Refills: 0 | Status: SHIPPED | OUTPATIENT
Start: 2020-11-10 | End: 2020-11-12 | Stop reason: SDUPTHER

## 2020-11-10 NOTE — TELEPHONE ENCOUNTER
Patient started that she has an appointment with [facility] tomorrow and will try to have this done then.  If not, then she will do this when she sees Dr. Adame on Thursday.

## 2020-11-11 ENCOUNTER — OFFICE VISIT (OUTPATIENT)
Dept: ONCOLOGY | Facility: CLINIC | Age: 75
End: 2020-11-11

## 2020-11-11 ENCOUNTER — LAB (OUTPATIENT)
Dept: OTHER | Facility: HOSPITAL | Age: 75
End: 2020-11-11

## 2020-11-11 VITALS
DIASTOLIC BLOOD PRESSURE: 88 MMHG | OXYGEN SATURATION: 96 % | TEMPERATURE: 98 F | WEIGHT: 141.4 LBS | HEIGHT: 65 IN | SYSTOLIC BLOOD PRESSURE: 144 MMHG | BODY MASS INDEX: 23.56 KG/M2 | HEART RATE: 81 BPM | RESPIRATION RATE: 16 BRPM

## 2020-11-11 DIAGNOSIS — D50.9 IRON DEFICIENCY ANEMIA, UNSPECIFIED IRON DEFICIENCY ANEMIA TYPE: ICD-10-CM

## 2020-11-11 DIAGNOSIS — E03.9 HYPOTHYROIDISM, UNSPECIFIED TYPE: ICD-10-CM

## 2020-11-11 DIAGNOSIS — D69.3 IMMUNE THROMBOCYTOPENIC PURPURA (HCC): Primary | ICD-10-CM

## 2020-11-11 DIAGNOSIS — Z78.9 VEGETARIAN: ICD-10-CM

## 2020-11-11 DIAGNOSIS — D51.3 OTHER DIETARY VITAMIN B12 DEFICIENCY ANEMIA: ICD-10-CM

## 2020-11-11 DIAGNOSIS — D69.3 IMMUNE THROMBOCYTOPENIC PURPURA (HCC): ICD-10-CM

## 2020-11-11 LAB
ACANTHOCYTES BLD QL SMEAR: NORMAL
BASOPHILS # BLD AUTO: 0.04 10*3/MM3 (ref 0–0.2)
BASOPHILS NFR BLD AUTO: 0.3 % (ref 0–1.5)
DEPRECATED RDW RBC AUTO: 60.4 FL (ref 37–54)
EOSINOPHIL # BLD AUTO: 0.01 10*3/MM3 (ref 0–0.4)
EOSINOPHIL NFR BLD AUTO: 0.1 % (ref 0.3–6.2)
ERYTHROCYTE [DISTWIDTH] IN BLOOD BY AUTOMATED COUNT: 20.2 % (ref 12.3–15.4)
FERRITIN SERPL-MCNC: 52.8 NG/ML (ref 13–150)
HCT VFR BLD AUTO: 43.4 % (ref 34–46.6)
HGB BLD-MCNC: 14.5 G/DL (ref 12–15.9)
IMM GRANULOCYTES # BLD AUTO: 0.32 10*3/MM3 (ref 0–0.05)
IMM GRANULOCYTES NFR BLD AUTO: 2.7 % (ref 0–0.5)
IRON 24H UR-MRATE: 55 MCG/DL (ref 37–145)
IRON SATN MFR SERPL: 16 % (ref 20–50)
LYMPHOCYTES # BLD AUTO: 0.75 10*3/MM3 (ref 0.7–3.1)
LYMPHOCYTES NFR BLD AUTO: 6.4 % (ref 19.6–45.3)
MCH RBC QN AUTO: 27.9 PG (ref 26.6–33)
MCHC RBC AUTO-ENTMCNC: 33.4 G/DL (ref 31.5–35.7)
MCV RBC AUTO: 83.6 FL (ref 79–97)
MONOCYTES # BLD AUTO: 0.26 10*3/MM3 (ref 0.1–0.9)
MONOCYTES NFR BLD AUTO: 2.2 % (ref 5–12)
NEUTROPHILS NFR BLD AUTO: 10.38 10*3/MM3 (ref 1.7–7)
NEUTROPHILS NFR BLD AUTO: 88.3 % (ref 42.7–76)
NRBC BLD AUTO-RTO: 0 /100 WBC (ref 0–0.2)
OVALOCYTES BLD QL SMEAR: NORMAL
PLAT MORPH BLD: NORMAL
PLATELET # BLD AUTO: 165 10*3/MM3 (ref 140–450)
PMV BLD AUTO: 10.8 FL (ref 6–12)
RBC # BLD AUTO: 5.19 10*6/MM3 (ref 3.77–5.28)
T4 FREE SERPL-MCNC: 0.99 NG/DL (ref 0.93–1.7)
TIBC SERPL-MCNC: 338 MCG/DL (ref 298–536)
TRANSFERRIN SERPL-MCNC: 227 MG/DL (ref 200–360)
TSH SERPL DL<=0.05 MIU/L-ACNC: 0.99 UIU/ML (ref 0.27–4.2)
VIT B12 BLD-MCNC: 1947 PG/ML (ref 211–946)
WBC # BLD AUTO: 11.76 10*3/MM3 (ref 3.4–10.8)
WBC MORPH BLD: NORMAL

## 2020-11-11 PROCEDURE — 85025 COMPLETE CBC W/AUTO DIFF WBC: CPT | Performed by: INTERNAL MEDICINE

## 2020-11-11 PROCEDURE — 84439 ASSAY OF FREE THYROXINE: CPT | Performed by: FAMILY MEDICINE

## 2020-11-11 PROCEDURE — 85007 BL SMEAR W/DIFF WBC COUNT: CPT | Performed by: INTERNAL MEDICINE

## 2020-11-11 PROCEDURE — 84466 ASSAY OF TRANSFERRIN: CPT | Performed by: INTERNAL MEDICINE

## 2020-11-11 PROCEDURE — 36415 COLL VENOUS BLD VENIPUNCTURE: CPT

## 2020-11-11 PROCEDURE — 99214 OFFICE O/P EST MOD 30 MIN: CPT | Performed by: INTERNAL MEDICINE

## 2020-11-11 PROCEDURE — 84443 ASSAY THYROID STIM HORMONE: CPT | Performed by: FAMILY MEDICINE

## 2020-11-11 PROCEDURE — 82728 ASSAY OF FERRITIN: CPT | Performed by: INTERNAL MEDICINE

## 2020-11-11 PROCEDURE — 82607 VITAMIN B-12: CPT | Performed by: FAMILY MEDICINE

## 2020-11-11 PROCEDURE — 83540 ASSAY OF IRON: CPT | Performed by: INTERNAL MEDICINE

## 2020-11-11 RX ORDER — PREDNISONE 10 MG/1
20 TABLET ORAL DAILY
Qty: 90 TABLET | Refills: 1
Start: 2020-11-11 | End: 2020-12-23 | Stop reason: SDUPTHER

## 2020-11-11 NOTE — PROGRESS NOTES
Subjective     CHIEF COMPLAINT:      Chief Complaint   Patient presents with   • Follow-up     discuss platelet       HISTORY OF PRESENT ILLNESS:     Karly Mcgrath is a 75 y.o. female patient who returns today for follow up on her thrombocytopenia and anemia. She is on prednisone 30 mg daily. She is on oral iron and vitamin B12 daily. She reports feeling better since the start of the steroid therapy and the oral iron. She is not having problems with bleeding.    Patient has an elevated blood pressure today. She is not having problem with upset stomach at present. She is taking Prilosec as needed.    REVIEW OF SYSTEMS:  Review of Systems   Constitutional: Negative for fatigue, fever and unexpected weight change.   HENT: Negative for nosebleeds and voice change.    Eyes: Negative for visual disturbance.   Respiratory: Negative for cough and shortness of breath.    Cardiovascular: Negative for chest pain and leg swelling.   Gastrointestinal: Negative for abdominal pain, blood in stool, constipation, diarrhea, nausea and vomiting.   Genitourinary: Negative for frequency and hematuria.   Musculoskeletal: Negative for back pain and joint swelling.   Skin: Negative for rash.   Neurological: Negative for dizziness and headaches.   Hematological: Negative for adenopathy. Does not bruise/bleed easily.   Psychiatric/Behavioral: Negative for dysphoric mood. The patient is not nervous/anxious.      I reviewed and verified the CC and ROS obtained by the MA.     Past Medical History:   Diagnosis Date   • Anxiety    • Basal cell carcinoma    • Disease of thyroid gland    • Hyperlipidemia    • Hypertension    • Maxillary sinusitis 10/08/2015    Resolved   • Pneumonia        Past Surgical History:   Procedure Laterality Date   • APPENDECTOMY     • BRONCHOSCOPY      6-7 YEARS AGO   • BRONCHOSCOPY N/A 9/18/2018    Procedure: BRONCHOSCOPY WITH BAL;  Surgeon: Josué Espinosa MD;  Location: [facility] ENDOSCOPY;  Service: Pulmonary    • COLONOSCOPY     • TUBAL ABDOMINAL LIGATION     • WRIST SURGERY         Cancer-related family history includes Breast cancer in her daughter.  Social History     Tobacco Use   • Smoking status: Never Smoker   • Smokeless tobacco: Never Used   Substance Use Topics   • Alcohol use: No       MEDICATIONS:    Current Outpatient Medications:   •  acyclovir (ZOVIRAX) 200 MG capsule, Take 1 capsule by mouth 5 (Five) Times a Day. (Patient taking differently: Take 200 mg by mouth 5 (Five) Times a Day As Needed (cold sores).), Disp: 25 capsule, Rfl: 2  •  acyclovir (ZOVIRAX) 5 % ointment, Apply  topically to the appropriate area as directed 5 (Five) Times a Day As Needed (cold sore)., Disp: 15 g, Rfl: 1  •  atorvastatin (LIPITOR) 10 MG tablet, Take 1 tablet by mouth Daily., Disp: 90 tablet, Rfl: 3  •  busPIRone (BUSPAR) 5 MG tablet, TAKE ONE TABLET BY MOUTH TWO TIMES A DAY FOR ANXIETY, Disp: 180 tablet, Rfl: 2  •  Calcium Carb-Cholecalciferol (CALCIUM 600 + D) 600-200 MG-UNIT tablet, Take 2,000 mg by mouth 2 (Two) Times a Day., Disp: , Rfl:   •  cholecalciferol (VITAMIN D3) 1000 units tablet, Take 2,000 Units by mouth Daily., Disp: , Rfl:   •  Cyanocobalamin (VITAMIN B 12 PO), Take 1,000 mcg by mouth Daily., Disp: , Rfl:   •  ferrous sulfate 325 (65 FE) MG tablet, Take 1 tablet by mouth Daily., Disp: , Rfl:   •  levothyroxine (SYNTHROID, LEVOTHROID) 50 MCG tablet, TAKE ONE TABLET BY MOUTH DAILY, Disp: 30 tablet, Rfl: 0  •  losartan (COZAAR) 50 MG tablet, TAKE ONE TABLET BY MOUTH DAILY, Disp: 90 tablet, Rfl: 0  •  metoprolol succinate XL (TOPROL-XL) 50 MG 24 hr tablet, Take 1 tablet by mouth Daily., Disp: 90 tablet, Rfl: 3  •  omeprazole (priLOSEC) 40 MG capsule, Take 40 mg by mouth As Needed. Only takes if prednisone upsets stomach. Hasn't needed it so far.., Disp: , Rfl:   •  predniSONE (DELTASONE) 10 MG tablet, Take 3 tablets by mouth Daily., Disp: 90 tablet, Rfl: 1    Current Facility-Administered Medications:   •   "cyanocobalamin injection 1,000 mcg, 1,000 mcg, Intramuscular, Weekly, Vito Adame MD, 1,000 mcg at 03/09/20 1041    ALLERGIES:  Allergies   Allergen Reactions   • Eggs Or Egg-Derived Products Unknown - Low Severity     PATIENT STATES AN ALLERGY TEST SHOWED THIS ALLERGY. PATIENT STATES SHE EATS EGGS AND HAS NEVER HAD PROBLEMS.   • Sulfa Antibiotics Unknown - Low Severity     PATIENT CANNOT RECALL REACTION.         Objective   VITAL SIGNS:     Vitals:    11/11/20 1524   BP: 144/88   Pulse: 81   Resp: 16   Temp: 98 °F (36.7 °C)   TempSrc: Temporal   SpO2: 96%   Weight: 64.1 kg (141 lb 6.4 oz)   Height: 165.1 cm (65\")   PainSc: 0-No pain     Body mass index is 23.53 kg/m².     Wt Readings from Last 3 Encounters:   11/11/20 64.1 kg (141 lb 6.4 oz)   10/28/20 61.2 kg (135 lb)   10/21/20 62.3 kg (137 lb 6.4 oz)       PHYSICAL EXAMINATION:  GENERAL:  The patient appears in good general condition, not in acute distress.  SKIN: No skin rashes. No ecchymosis.  HEAD:  Normocephalic.  EYES:  No Jaundice. No Pallor. Pupils equal. EOMI.  NECK:  Supple with Good ROM. No Thyromegaly. No Masses.  LYMPHATICS:  No cervical or supraclavicular lymphadenopathy.  CHEST: Normal respiratory effort. Lungs clear to auscultation.   CARDIAC:  Normal S1 & S2. No murmur.  ABDOMEN: Nondistended  EXTREMITIES:  No clubbing. No deforming arthritis in the hands.   NEUROLOGICAL:  No Focal neurological deficits.       DIAGNOSTIC DATA:     Results from last 7 days   Lab Units 11/11/20  1448   WBC 10*3/mm3 11.76*   NEUTROS ABS 10*3/mm3 10.38*   HEMOGLOBIN g/dL 14.5   HEMATOCRIT % 43.4   PLATELETS 10*3/mm3 165       Component      Latest Ref Rng & Units 9/30/2020 11/11/2020   Iron      37 - 145 mcg/dL 38 55   Iron Saturation      20 - 50 % 9 (L) 16 (L)   Transferrin      200 - 360 mg/dL 297 227   TIBC      298 - 536 mcg/dL 443 338   Methylmalonic Acid      0 - 378 nmol/L 197    DISCLAIMER       Comment    Vitamin B-12      211 - 946 pg/mL 1,461 (H) " 1,947 (H)   Folate      4.78 - 24.20 ng/mL 8.35    Ferritin      13.00 - 150.00 ng/mL 14.70 52.80       Assessment/Plan   *Acute thrombocytopenia diagnosed on 9/30/2020.    · Patient has prior history of chronic thrombocytopenia.  Platelet count was 119,000 on 8/28/2019.   · Platelet count decreased to 44,000 on 9/10/2020.   · Platelet count decreased to 11,000 on 9/30/2020.   · There was no evidence of leukemia or lymphoma on flow cytometry on 9/30/2020.  · Patient was started on prednisone 1 mg/kg daily on 9/30/2020.  She responded with improvement in the platelet count to 61,000 on 10/5/2020.    · Platelet count remains >150,000. The dose of prednisone has been gradually tapered with weekly monitoring of her platelet count.  · Patient is currently on prednisone 30 mg daily.    *Iron deficiency anemia.  · Hemoglobin was 11.1 on 9/10/2020.  · No evidence of B12 or folate deficiency.  · Patient was started on ferrous sulfate 325 mg daily on 9/30/2020.  · Hemoglobin improved to 13.6 on 10/14/2020.  · Hemoglobin improved to 14.5 today.  · Iron stores have improved.  · B12 level is now elevated.    PLAN:    1. Reduce prednisone to 20 mg daily.  2. Reduce vitamin B12 to once weekly.  3. Continue ferrous sulfate once daily.  4. Return every 2 weeks for CBC with RN review. If her platelets continue to be stable, we will reduce the dose of prednisone by 5 mg every 2 weeks.  5. Follow-up in 8 weeks with CBC ferritin iron panel.        Garcia Muro MD  11/11/20

## 2020-11-12 ENCOUNTER — OFFICE VISIT (OUTPATIENT)
Dept: INTERNAL MEDICINE | Facility: CLINIC | Age: 75
End: 2020-11-12

## 2020-11-12 VITALS
DIASTOLIC BLOOD PRESSURE: 80 MMHG | SYSTOLIC BLOOD PRESSURE: 160 MMHG | BODY MASS INDEX: 23.68 KG/M2 | OXYGEN SATURATION: 98 % | WEIGHT: 142.1 LBS | HEART RATE: 71 BPM | HEIGHT: 65 IN

## 2020-11-12 DIAGNOSIS — I10 ESSENTIAL HYPERTENSION: Primary | ICD-10-CM

## 2020-11-12 DIAGNOSIS — E78.2 MIXED HYPERLIPIDEMIA: ICD-10-CM

## 2020-11-12 DIAGNOSIS — E03.9 HYPOTHYROIDISM, UNSPECIFIED TYPE: ICD-10-CM

## 2020-11-12 PROCEDURE — 99214 OFFICE O/P EST MOD 30 MIN: CPT | Performed by: FAMILY MEDICINE

## 2020-11-12 RX ORDER — LOSARTAN POTASSIUM 50 MG/1
50 TABLET ORAL 2 TIMES DAILY
Qty: 180 TABLET | Refills: 1 | Status: SHIPPED | OUTPATIENT
Start: 2020-11-12 | End: 2020-12-10

## 2020-11-12 RX ORDER — LEVOTHYROXINE SODIUM 0.05 MG/1
50 TABLET ORAL DAILY
Qty: 90 TABLET | Refills: 1 | Status: SHIPPED | OUTPATIENT
Start: 2020-11-12 | End: 2020-12-03

## 2020-11-12 NOTE — PROGRESS NOTES
Karly Mcgrath is a 75 y.o. female.      Assessment/Plan   Problems Addressed this Visit        Cardiovascular and Mediastinum    Hyperlipidemia    Hypertension - Primary    Relevant Medications    losartan (COZAAR) 50 MG tablet       Endocrine    Hypothyroidism    Relevant Medications    levothyroxine (SYNTHROID, LEVOTHROID) 50 MCG tablet      Diagnoses       Codes Comments    Essential hypertension    -  Primary ICD-10-CM: I10  ICD-9-CM: 401.9     Hypothyroidism, unspecified type     ICD-10-CM: E03.9  ICD-9-CM: 244.9     Mixed hyperlipidemia     ICD-10-CM: E78.2  ICD-9-CM: 272.2         She is being followed by hematology for thrombocytopenia presently on prednisone 20 mg daily   Hypothyroidism continue low-dose levothyroxine 50 mcg daily  Change - losartan 50 mg twice daily  Hyperlipidemia continue atorvastatin  Monitor blood pressure she will call in 1 month  No follow-ups on file.      Chief Complaint   Patient presents with   • follow up to hypothyroidism   • follow up to hyperlipidemia   • follow up to hypertension     Social History     Tobacco Use   • Smoking status: Never Smoker   • Smokeless tobacco: Never Used   Substance Use Topics   • Alcohol use: No   • Drug use: No       History of Present Illness   Follow-up ongoing management of hypothyroidism hyperlipidemia hypertension  Her thyroid dose was adjusted due to recent elevated TSH repeat reduce dose created normal TSH level  She will continue this we will repeat TSH in 6 months  Hypertension starting to increase could be due to steroid use patient will have no headache chest pain shortness of breath  No unwanted side effects of statin therapy  The following portions of the patient's history were reviewed and updated as appropriate:PMHroutine: Social history , Allergies, Current Medications, Active Problem List and Health Maintenance    Review of Systems   Constitutional: Negative.    Respiratory: Negative.    Cardiovascular: Negative.   "  Gastrointestinal: Negative.    Neurological: Negative.        Objective   Vitals:    11/12/20 1335   BP: 160/80   BP Location: Right arm   Patient Position: Sitting   Cuff Size: Adult   Pulse: 71   SpO2: 98%   Weight: 64.5 kg (142 lb 1.6 oz)   Height: 165.1 cm (65\")     Body mass index is 23.65 kg/m².  Physical Exam  Vitals signs and nursing note reviewed.   Constitutional:       Appearance: Normal appearance.   HENT:      Head: Normocephalic and atraumatic.   Cardiovascular:      Rate and Rhythm: Normal rate and regular rhythm.      Pulses: Normal pulses.      Heart sounds: Normal heart sounds.   Pulmonary:      Effort: Pulmonary effort is normal.      Breath sounds: Normal breath sounds.   Musculoskeletal:      Right lower leg: No edema.      Left lower leg: No edema.   Neurological:      Mental Status: She is alert.   Psychiatric:         Mood and Affect: Mood normal.         Behavior: Behavior normal.         Thought Content: Thought content normal.         Judgment: Judgment normal.       Reviewed Data:  Lab on 11/11/2020   Component Date Value Ref Range Status   • Ferritin 11/11/2020 52.80  13.00 - 150.00 ng/mL Final   • Iron 11/11/2020 55  37 - 145 mcg/dL Final   • Iron Saturation 11/11/2020 16* 20 - 50 % Final   • Transferrin 11/11/2020 227  200 - 360 mg/dL Final   • TIBC 11/11/2020 338  298 - 536 mcg/dL Final   • WBC 11/11/2020 11.76* 3.40 - 10.80 10*3/mm3 Final   • RBC 11/11/2020 5.19  3.77 - 5.28 10*6/mm3 Final   • Hemoglobin 11/11/2020 14.5  12.0 - 15.9 g/dL Final   • Hematocrit 11/11/2020 43.4  34.0 - 46.6 % Final   • MCV 11/11/2020 83.6  79.0 - 97.0 fL Final   • MCH 11/11/2020 27.9  26.6 - 33.0 pg Final   • MCHC 11/11/2020 33.4  31.5 - 35.7 g/dL Final   • RDW 11/11/2020 20.2* 12.3 - 15.4 % Final   • RDW-SD 11/11/2020 60.4* 37.0 - 54.0 fl Final   • MPV 11/11/2020 10.8  6.0 - 12.0 fL Final   • Platelets 11/11/2020 165  140 - 450 10*3/mm3 Final   • Neutrophil % 11/11/2020 88.3* 42.7 - 76.0 % Final   • " Lymphocyte % 11/11/2020 6.4* 19.6 - 45.3 % Final   • Monocyte % 11/11/2020 2.2* 5.0 - 12.0 % Final   • Eosinophil % 11/11/2020 0.1* 0.3 - 6.2 % Final   • Basophil % 11/11/2020 0.3  0.0 - 1.5 % Final   • Immature Grans % 11/11/2020 2.7* 0.0 - 0.5 % Final   • Neutrophils, Absolute 11/11/2020 10.38* 1.70 - 7.00 10*3/mm3 Final   • Lymphocytes, Absolute 11/11/2020 0.75  0.70 - 3.10 10*3/mm3 Final   • Monocytes, Absolute 11/11/2020 0.26  0.10 - 0.90 10*3/mm3 Final   • Eosinophils, Absolute 11/11/2020 0.01  0.00 - 0.40 10*3/mm3 Final   • Basophils, Absolute 11/11/2020 0.04  0.00 - 0.20 10*3/mm3 Final   • Immature Grans, Absolute 11/11/2020 0.32* 0.00 - 0.05 10*3/mm3 Final   • nRBC 11/11/2020 0.0  0.0 - 0.2 /100 WBC Final   • Vitamin B-12 11/11/2020 1,947* 211 - 946 pg/mL Final   • TSH 11/11/2020 0.987  0.270 - 4.200 uIU/mL Final   • Free T4 11/11/2020 0.99  0.93 - 1.70 ng/dL Final   • Acanthocytes 11/11/2020 Slight/1+  None Seen Final   • Ovalocytes 11/11/2020 Slight/1+  None Seen Final   • WBC Morphology 11/11/2020 Normal  Normal Final   • Platelet Morphology 11/11/2020 Normal  Normal Final   Lab on 11/04/2020   Component Date Value Ref Range Status   • WBC 11/04/2020 14.44* 3.40 - 10.80 10*3/mm3 Final   • RBC 11/04/2020 5.17  3.77 - 5.28 10*6/mm3 Final   • Hemoglobin 11/04/2020 14.5  12.0 - 15.9 g/dL Final   • Hematocrit 11/04/2020 42.1  34.0 - 46.6 % Final   • MCV 11/04/2020 81.4  79.0 - 97.0 fL Final   • MCH 11/04/2020 28.0  26.6 - 33.0 pg Final   • MCHC 11/04/2020 34.4  31.5 - 35.7 g/dL Final   • RDW 11/04/2020 19.9* 12.3 - 15.4 % Final   • RDW-SD 11/04/2020 57.8* 37.0 - 54.0 fl Final   • MPV 11/04/2020 10.7  6.0 - 12.0 fL Final   • Platelets 11/04/2020 170  140 - 450 10*3/mm3 Final   • Neutrophil % 11/04/2020 71.3  42.7 - 76.0 % Final   • Lymphocyte % 11/04/2020 17.8* 19.6 - 45.3 % Final   • Monocyte % 11/04/2020 6.7  5.0 - 12.0 % Final   • Eosinophil % 11/04/2020 0.7  0.3 - 6.2 % Final   • Basophil % 11/04/2020 0.4   0.0 - 1.5 % Final   • Immature Grans % 11/04/2020 3.1* 0.0 - 0.5 % Final   • Neutrophils, Absolute 11/04/2020 10.29* 1.70 - 7.00 10*3/mm3 Final   • Lymphocytes, Absolute 11/04/2020 2.57  0.70 - 3.10 10*3/mm3 Final   • Monocytes, Absolute 11/04/2020 0.97* 0.10 - 0.90 10*3/mm3 Final   • Eosinophils, Absolute 11/04/2020 0.10  0.00 - 0.40 10*3/mm3 Final   • Basophils, Absolute 11/04/2020 0.06  0.00 - 0.20 10*3/mm3 Final   • Immature Grans, Absolute 11/04/2020 0.45* 0.00 - 0.05 10*3/mm3 Final   • nRBC 11/04/2020 0.0  0.0 - 0.2 /100 WBC Final   • RBC Morphology 11/04/2020 Normal  Normal Final   • WBC Morphology 11/04/2020 Normal  Normal Final   • Platelet Morphology 11/04/2020 Normal  Normal Final   Lab on 10/28/2020   Component Date Value Ref Range Status   • WBC 10/28/2020 14.13* 3.40 - 10.80 10*3/mm3 Final   • RBC 10/28/2020 4.93  3.77 - 5.28 10*6/mm3 Final   • Hemoglobin 10/28/2020 13.6  12.0 - 15.9 g/dL Final   • Hematocrit 10/28/2020 39.8  34.0 - 46.6 % Final   • MCV 10/28/2020 80.7  79.0 - 97.0 fL Final   • MCH 10/28/2020 27.6  26.6 - 33.0 pg Final   • MCHC 10/28/2020 34.2  31.5 - 35.7 g/dL Final   • RDW 10/28/2020 19.9* 12.3 - 15.4 % Final   • RDW-SD 10/28/2020 56.8* 37.0 - 54.0 fl Final   • MPV 10/28/2020 10.7  6.0 - 12.0 fL Final   • Platelets 10/28/2020 124* 140 - 450 10*3/mm3 Final   • Neutrophil % 10/28/2020 73.1  42.7 - 76.0 % Final   • Lymphocyte % 10/28/2020 17.8* 19.6 - 45.3 % Final   • Monocyte % 10/28/2020 6.2  5.0 - 12.0 % Final   • Eosinophil % 10/28/2020 0.9  0.3 - 6.2 % Final   • Basophil % 10/28/2020 0.2  0.0 - 1.5 % Final   • Immature Grans % 10/28/2020 1.8* 0.0 - 0.5 % Final   • Neutrophils, Absolute 10/28/2020 10.34* 1.70 - 7.00 10*3/mm3 Final   • Lymphocytes, Absolute 10/28/2020 2.51  0.70 - 3.10 10*3/mm3 Final   • Monocytes, Absolute 10/28/2020 0.87  0.10 - 0.90 10*3/mm3 Final   • Eosinophils, Absolute 10/28/2020 0.13  0.00 - 0.40 10*3/mm3 Final   • Basophils, Absolute 10/28/2020 0.03  0.00 -  0.20 10*3/mm3 Final   • Immature Grans, Absolute 10/28/2020 0.25* 0.00 - 0.05 10*3/mm3 Final   • nRBC 10/28/2020 0.0  0.0 - 0.2 /100 WBC Final   • Acanthocytes 10/28/2020 Slight/1+  None Seen Final   • Ovalocytes 10/28/2020 Slight/1+  None Seen Final   • WBC Morphology 10/28/2020 Normal  Normal Final   • Platelet Morphology 10/28/2020 Normal  Normal Final   Lab on 10/21/2020   Component Date Value Ref Range Status   • WBC 10/21/2020 19.58* 3.40 - 10.80 10*3/mm3 Final   • RBC 10/21/2020 4.97  3.77 - 5.28 10*6/mm3 Final   • Hemoglobin 10/21/2020 13.4  12.0 - 15.9 g/dL Final   • Hematocrit 10/21/2020 39.9  34.0 - 46.6 % Final   • MCV 10/21/2020 80.3  79.0 - 97.0 fL Final   • MCH 10/21/2020 27.0  26.6 - 33.0 pg Final   • MCHC 10/21/2020 33.6  31.5 - 35.7 g/dL Final   • RDW 10/21/2020 19.4* 12.3 - 15.4 % Final   • RDW-SD 10/21/2020 55.3* 37.0 - 54.0 fl Final   • MPV 10/21/2020 11.5  6.0 - 12.0 fL Final   • Platelets 10/21/2020 144  140 - 450 10*3/mm3 Final   • Neutrophil % 10/21/2020 69.9  42.7 - 76.0 % Final   • Lymphocyte % 10/21/2020 21.6  19.6 - 45.3 % Final   • Monocyte % 10/21/2020 6.8  5.0 - 12.0 % Final   • Eosinophil % 10/21/2020 0.3  0.3 - 6.2 % Final   • Basophil % 10/21/2020 0.2  0.0 - 1.5 % Final   • Immature Grans % 10/21/2020 1.2* 0.0 - 0.5 % Final   • Neutrophils, Absolute 10/21/2020 13.69* 1.70 - 7.00 10*3/mm3 Final   • Lymphocytes, Absolute 10/21/2020 4.23* 0.70 - 3.10 10*3/mm3 Final   • Monocytes, Absolute 10/21/2020 1.33* 0.10 - 0.90 10*3/mm3 Final   • Eosinophils, Absolute 10/21/2020 0.06  0.00 - 0.40 10*3/mm3 Final   • Basophils, Absolute 10/21/2020 0.03  0.00 - 0.20 10*3/mm3 Final   • Immature Grans, Absolute 10/21/2020 0.24* 0.00 - 0.05 10*3/mm3 Final   • nRBC 10/21/2020 0.0  0.0 - 0.2 /100 WBC Final   • Acanthocytes 10/21/2020 Slight/1+  None Seen Final   • Ovalocytes 10/21/2020 Slight/1+  None Seen Final   • WBC Morphology 10/21/2020 Normal  Normal Final   • Platelet Morphology 10/21/2020 Normal   Normal Final   Lab on 10/14/2020   Component Date Value Ref Range Status   • IPF 10/14/2020 5.70  0.90 - 6.50 % Final   • Platelets 10/14/2020 160  140 - 450 10*3/mm3 Final   • WBC 10/14/2020 16.47* 3.40 - 10.80 10*3/mm3 Final   • RBC 10/14/2020 5.10  3.77 - 5.28 10*6/mm3 Final   • Hemoglobin 10/14/2020 13.6  12.0 - 15.9 g/dL Final   • Hematocrit 10/14/2020 40.2  34.0 - 46.6 % Final   • MCV 10/14/2020 78.8* 79.0 - 97.0 fL Final   • MCH 10/14/2020 26.7  26.6 - 33.0 pg Final   • MCHC 10/14/2020 33.8  31.5 - 35.7 g/dL Final   • RDW 10/14/2020 18.3* 12.3 - 15.4 % Final   • RDW-SD 10/14/2020 50.0  37.0 - 54.0 fl Final   • MPV 10/14/2020 11.4  6.0 - 12.0 fL Final   • Platelets 10/14/2020 160  140 - 450 10*3/mm3 Final   • Neutrophil % 10/14/2020 85.1* 42.7 - 76.0 % Final   • Lymphocyte % 10/14/2020 8.9* 19.6 - 45.3 % Final   • Monocyte % 10/14/2020 2.9* 5.0 - 12.0 % Final   • Eosinophil % 10/14/2020 0.0* 0.3 - 6.2 % Final   • Basophil % 10/14/2020 0.2  0.0 - 1.5 % Final   • Immature Grans % 10/14/2020 2.9* 0.0 - 0.5 % Final   • Neutrophils, Absolute 10/14/2020 14.01* 1.70 - 7.00 10*3/mm3 Final   • Lymphocytes, Absolute 10/14/2020 1.47  0.70 - 3.10 10*3/mm3 Final   • Monocytes, Absolute 10/14/2020 0.48  0.10 - 0.90 10*3/mm3 Final   • Eosinophils, Absolute 10/14/2020 0.00  0.00 - 0.40 10*3/mm3 Final   • Basophils, Absolute 10/14/2020 0.04  0.00 - 0.20 10*3/mm3 Final   • Immature Grans, Absolute 10/14/2020 0.47* 0.00 - 0.05 10*3/mm3 Final   • nRBC 10/14/2020 0.0  0.0 - 0.2 /100 WBC Final   • Poikilocytes 10/14/2020 Slight/1+  None Seen Final   • WBC Morphology 10/14/2020 Normal  Normal Final   • Platelet Morphology 10/14/2020 Normal  Normal Final

## 2020-11-24 ENCOUNTER — CLINICAL SUPPORT (OUTPATIENT)
Dept: ONCOLOGY | Facility: HOSPITAL | Age: 75
End: 2020-11-24

## 2020-11-24 ENCOUNTER — LAB (OUTPATIENT)
Dept: OTHER | Facility: HOSPITAL | Age: 75
End: 2020-11-24

## 2020-11-24 VITALS — RESPIRATION RATE: 18 BRPM | TEMPERATURE: 97.3 F | WEIGHT: 146.2 LBS | HEIGHT: 65 IN | BODY MASS INDEX: 24.36 KG/M2

## 2020-11-24 DIAGNOSIS — D69.3 IMMUNE THROMBOCYTOPENIC PURPURA (HCC): ICD-10-CM

## 2020-11-24 DIAGNOSIS — D50.9 IRON DEFICIENCY ANEMIA, UNSPECIFIED IRON DEFICIENCY ANEMIA TYPE: ICD-10-CM

## 2020-11-24 LAB
ANISOCYTOSIS BLD QL: NORMAL
BASOPHILS # BLD AUTO: 0.07 10*3/MM3 (ref 0–0.2)
BASOPHILS NFR BLD AUTO: 0.6 % (ref 0–1.5)
DEPRECATED RDW RBC AUTO: 53.7 FL (ref 37–54)
EOSINOPHIL # BLD AUTO: 0.06 10*3/MM3 (ref 0–0.4)
EOSINOPHIL NFR BLD AUTO: 0.5 % (ref 0.3–6.2)
ERYTHROCYTE [DISTWIDTH] IN BLOOD BY AUTOMATED COUNT: 18.6 % (ref 12.3–15.4)
HCT VFR BLD AUTO: 40.5 % (ref 34–46.6)
HGB BLD-MCNC: 14.2 G/DL (ref 12–15.9)
IMM GRANULOCYTES # BLD AUTO: 0.52 10*3/MM3 (ref 0–0.05)
IMM GRANULOCYTES NFR BLD AUTO: 4.2 % (ref 0–0.5)
LYMPHOCYTES # BLD AUTO: 2.15 10*3/MM3 (ref 0.7–3.1)
LYMPHOCYTES NFR BLD AUTO: 17.2 % (ref 19.6–45.3)
MCH RBC QN AUTO: 28.4 PG (ref 26.6–33)
MCHC RBC AUTO-ENTMCNC: 35.1 G/DL (ref 31.5–35.7)
MCV RBC AUTO: 81 FL (ref 79–97)
MONOCYTES # BLD AUTO: 0.82 10*3/MM3 (ref 0.1–0.9)
MONOCYTES NFR BLD AUTO: 6.6 % (ref 5–12)
NEUTROPHILS NFR BLD AUTO: 70.9 % (ref 42.7–76)
NEUTROPHILS NFR BLD AUTO: 8.86 10*3/MM3 (ref 1.7–7)
NRBC BLD AUTO-RTO: 0 /100 WBC (ref 0–0.2)
PLAT MORPH BLD: NORMAL
PLATELET # BLD AUTO: 158 10*3/MM3 (ref 140–450)
PMV BLD AUTO: 12.4 FL (ref 6–12)
POIKILOCYTOSIS BLD QL SMEAR: NORMAL
RBC # BLD AUTO: 5 10*6/MM3 (ref 3.77–5.28)
WBC # BLD AUTO: 12.48 10*3/MM3 (ref 3.4–10.8)
WBC MORPH BLD: NORMAL

## 2020-11-24 PROCEDURE — 36415 COLL VENOUS BLD VENIPUNCTURE: CPT

## 2020-11-24 PROCEDURE — 85025 COMPLETE CBC W/AUTO DIFF WBC: CPT | Performed by: INTERNAL MEDICINE

## 2020-11-24 PROCEDURE — G0463 HOSPITAL OUTPT CLINIC VISIT: HCPCS

## 2020-11-24 PROCEDURE — 85007 BL SMEAR W/DIFF WBC COUNT: CPT | Performed by: INTERNAL MEDICINE

## 2020-11-24 NOTE — NURSING NOTE
Pt is here for lab with RN review.  CBC reviewed with pt, counts are stable for this pt at this time. Pt has no complaints.  Copy of labs given to pt and f/u appt reviewed. Pt is instructed to call the office with any concerns or new symptoms prior to next visit. Pt vu  Lab Results   Component Value Date    WBC 12.48 (H) 11/24/2020    HGB 14.2 11/24/2020    HCT 40.5 11/24/2020    MCV 81.0 11/24/2020     11/24/2020   patient continues on Prednisone 20mg QD will return for appointment and lab recheck in 2 weeks.

## 2020-11-30 RX ORDER — LEVOTHYROXINE SODIUM 0.07 MG/1
TABLET ORAL
Qty: 90 TABLET | Refills: 2 | Status: SHIPPED | OUTPATIENT
Start: 2020-11-30 | End: 2020-12-03

## 2020-12-03 ENCOUNTER — TELEPHONE (OUTPATIENT)
Dept: INTERNAL MEDICINE | Facility: CLINIC | Age: 75
End: 2020-12-03

## 2020-12-03 DIAGNOSIS — E03.9 HYPOTHYROIDISM, UNSPECIFIED TYPE: ICD-10-CM

## 2020-12-03 RX ORDER — LEVOTHYROXINE SODIUM 0.05 MG/1
50 TABLET ORAL DAILY
Qty: 90 TABLET | Refills: 2 | Status: SHIPPED | OUTPATIENT
Start: 2020-12-03 | End: 2021-03-15

## 2020-12-03 NOTE — TELEPHONE ENCOUNTER
Prescription for levothyroxine 50 mcg daily sent to pharmacy, disregard levothyroxine 75 mcg daily

## 2020-12-03 NOTE — TELEPHONE ENCOUNTER
PATIENT IS CONFIRMING THAT SHE SHOULD BE TAKING THE   levothyroxine (SYNTHROID, LEVOTHROID) 75 MCG tablet  INSTEAD OF 50 MG.    CALL BACK: [phone]

## 2020-12-03 NOTE — TELEPHONE ENCOUNTER
Please call patient and ask which dose she was taking on November 12 and last blood work was drawn

## 2020-12-03 NOTE — TELEPHONE ENCOUNTER
I looked in your last note and it says 50mcg but a refill was sent 11/29 for 75mcg and I don't see a result note for that, so I'm just confirming the dosage.

## 2020-12-09 ENCOUNTER — CLINICAL SUPPORT (OUTPATIENT)
Dept: ONCOLOGY | Facility: HOSPITAL | Age: 75
End: 2020-12-09

## 2020-12-09 ENCOUNTER — LAB (OUTPATIENT)
Dept: OTHER | Facility: HOSPITAL | Age: 75
End: 2020-12-09

## 2020-12-09 VITALS — HEIGHT: 65 IN | WEIGHT: 148.2 LBS | BODY MASS INDEX: 24.69 KG/M2 | RESPIRATION RATE: 18 BRPM | TEMPERATURE: 97.1 F

## 2020-12-09 DIAGNOSIS — D69.3 IMMUNE THROMBOCYTOPENIC PURPURA (HCC): ICD-10-CM

## 2020-12-09 DIAGNOSIS — D50.9 IRON DEFICIENCY ANEMIA, UNSPECIFIED IRON DEFICIENCY ANEMIA TYPE: ICD-10-CM

## 2020-12-09 LAB
BASOPHILS # BLD AUTO: 0.09 10*3/MM3 (ref 0–0.2)
BASOPHILS NFR BLD AUTO: 0.6 % (ref 0–1.5)
DEPRECATED RDW RBC AUTO: 55.4 FL (ref 37–54)
EOSINOPHIL # BLD AUTO: 0.06 10*3/MM3 (ref 0–0.4)
EOSINOPHIL NFR BLD AUTO: 0.4 % (ref 0.3–6.2)
ERYTHROCYTE [DISTWIDTH] IN BLOOD BY AUTOMATED COUNT: 18.6 % (ref 12.3–15.4)
HCT VFR BLD AUTO: 42.6 % (ref 34–46.6)
HGB BLD-MCNC: 14.7 G/DL (ref 12–15.9)
IMM GRANULOCYTES # BLD AUTO: 0.53 10*3/MM3 (ref 0–0.05)
IMM GRANULOCYTES NFR BLD AUTO: 3.8 % (ref 0–0.5)
LYMPHOCYTES # BLD AUTO: 3.03 10*3/MM3 (ref 0.7–3.1)
LYMPHOCYTES NFR BLD AUTO: 21.5 % (ref 19.6–45.3)
MCH RBC QN AUTO: 28.5 PG (ref 26.6–33)
MCHC RBC AUTO-ENTMCNC: 34.5 G/DL (ref 31.5–35.7)
MCV RBC AUTO: 82.6 FL (ref 79–97)
MONOCYTES # BLD AUTO: 1.05 10*3/MM3 (ref 0.1–0.9)
MONOCYTES NFR BLD AUTO: 7.5 % (ref 5–12)
NEUTROPHILS NFR BLD AUTO: 66.2 % (ref 42.7–76)
NEUTROPHILS NFR BLD AUTO: 9.32 10*3/MM3 (ref 1.7–7)
NRBC BLD AUTO-RTO: 0 /100 WBC (ref 0–0.2)
PLAT MORPH BLD: NORMAL
PLATELET # BLD AUTO: 168 10*3/MM3 (ref 140–450)
PMV BLD AUTO: 11.6 FL (ref 6–12)
RBC # BLD AUTO: 5.16 10*6/MM3 (ref 3.77–5.28)
RBC MORPH BLD: NORMAL
WBC # BLD AUTO: 14.08 10*3/MM3 (ref 3.4–10.8)
WBC MORPH BLD: NORMAL

## 2020-12-09 PROCEDURE — 36415 COLL VENOUS BLD VENIPUNCTURE: CPT

## 2020-12-09 PROCEDURE — G0463 HOSPITAL OUTPT CLINIC VISIT: HCPCS

## 2020-12-09 PROCEDURE — 85007 BL SMEAR W/DIFF WBC COUNT: CPT | Performed by: INTERNAL MEDICINE

## 2020-12-09 PROCEDURE — 85025 COMPLETE CBC W/AUTO DIFF WBC: CPT | Performed by: INTERNAL MEDICINE

## 2020-12-09 NOTE — NURSING NOTE
Pt is here for lab with RN review.  CBC reviewed with pt, counts are stable for this pt at this time. Platelet count up from last visit at 168K from 158K.  Pt has no complaints and states that she is taking 20mg of prednisone daily. According to Dr Muro's last note pt is to decrease prednisone by 5 mg every 2 weeks for stable platelet counts.  Spoke with Dr Muro and reviewed platelet counts. Per Dr Muro pt is to reduce prednisone to 10 mg daily. Pt will return in 2 week for repeat labs. Pt informed and copy of labs given to pt and f/u appt reviewed. Pt is instructed to call the office with any concerns or new symptoms prior to next visit. Pt vu    Lab Results   Component Value Date    WBC 14.08 (H) 12/09/2020    HGB 14.7 12/09/2020    HCT 42.6 12/09/2020    MCV 82.6 12/09/2020     12/09/2020

## 2020-12-10 RX ORDER — LOSARTAN POTASSIUM 50 MG/1
TABLET ORAL
Qty: 90 TABLET | Refills: 1 | Status: SHIPPED | OUTPATIENT
Start: 2020-12-10 | End: 2021-03-11 | Stop reason: SDUPTHER

## 2020-12-23 ENCOUNTER — CLINICAL SUPPORT (OUTPATIENT)
Dept: ONCOLOGY | Facility: HOSPITAL | Age: 75
End: 2020-12-23

## 2020-12-23 ENCOUNTER — LAB (OUTPATIENT)
Dept: OTHER | Facility: HOSPITAL | Age: 75
End: 2020-12-23

## 2020-12-23 VITALS — BODY MASS INDEX: 24.93 KG/M2 | RESPIRATION RATE: 18 BRPM | WEIGHT: 149.6 LBS | HEIGHT: 65 IN | TEMPERATURE: 97.3 F

## 2020-12-23 DIAGNOSIS — D50.9 IRON DEFICIENCY ANEMIA, UNSPECIFIED IRON DEFICIENCY ANEMIA TYPE: ICD-10-CM

## 2020-12-23 DIAGNOSIS — D69.3 IMMUNE THROMBOCYTOPENIC PURPURA (HCC): ICD-10-CM

## 2020-12-23 LAB
BASOPHILS # BLD AUTO: 0.06 10*3/MM3 (ref 0–0.2)
BASOPHILS NFR BLD AUTO: 0.6 % (ref 0–1.5)
DEPRECATED RDW RBC AUTO: 54 FL (ref 37–54)
EOSINOPHIL # BLD AUTO: 0.07 10*3/MM3 (ref 0–0.4)
EOSINOPHIL NFR BLD AUTO: 0.7 % (ref 0.3–6.2)
ERYTHROCYTE [DISTWIDTH] IN BLOOD BY AUTOMATED COUNT: 17.5 % (ref 12.3–15.4)
HCT VFR BLD AUTO: 40.5 % (ref 34–46.6)
HGB BLD-MCNC: 14.1 G/DL (ref 12–15.9)
IMM GRANULOCYTES # BLD AUTO: 0.36 10*3/MM3 (ref 0–0.05)
IMM GRANULOCYTES NFR BLD AUTO: 3.4 % (ref 0–0.5)
LYMPHOCYTES # BLD AUTO: 2.28 10*3/MM3 (ref 0.7–3.1)
LYMPHOCYTES NFR BLD AUTO: 21.4 % (ref 19.6–45.3)
MCH RBC QN AUTO: 29.5 PG (ref 26.6–33)
MCHC RBC AUTO-ENTMCNC: 34.8 G/DL (ref 31.5–35.7)
MCV RBC AUTO: 84.7 FL (ref 79–97)
MONOCYTES # BLD AUTO: 0.71 10*3/MM3 (ref 0.1–0.9)
MONOCYTES NFR BLD AUTO: 6.7 % (ref 5–12)
NEUTROPHILS NFR BLD AUTO: 67.2 % (ref 42.7–76)
NEUTROPHILS NFR BLD AUTO: 7.16 10*3/MM3 (ref 1.7–7)
NRBC BLD AUTO-RTO: 0 /100 WBC (ref 0–0.2)
PLATELET # BLD AUTO: 124 10*3/MM3 (ref 140–450)
PMV BLD AUTO: 11.5 FL (ref 6–12)
RBC # BLD AUTO: 4.78 10*6/MM3 (ref 3.77–5.28)
WBC # BLD AUTO: 10.64 10*3/MM3 (ref 3.4–10.8)

## 2020-12-23 PROCEDURE — G0463 HOSPITAL OUTPT CLINIC VISIT: HCPCS

## 2020-12-23 PROCEDURE — 85025 COMPLETE CBC W/AUTO DIFF WBC: CPT | Performed by: INTERNAL MEDICINE

## 2020-12-23 PROCEDURE — 36415 COLL VENOUS BLD VENIPUNCTURE: CPT

## 2020-12-23 RX ORDER — PREDNISONE 10 MG/1
20 TABLET ORAL DAILY
Qty: 90 TABLET | Refills: 1 | Status: SHIPPED | OUTPATIENT
Start: 2020-12-23 | End: 2021-01-06

## 2020-12-23 NOTE — NURSING NOTE
Pt is here for lab with RN review.  CBC reviewed with pt, Plt are down are 124 today down from 168. Pt decreased prednisone two weeks ago form 20 mg PO daily to 10 Mg PO daily.  Pt has no complaints.Reviwed labs with Dr. Muro and received verbal orders for pt to increase prednisone back to 20 mg PO daily.  Copy of labs given to pt, pt given written and verbal new prednisone dosing instructions.  f/u appt reviewed. Pt is instructed to call the office with any concerns or new symptoms prior to next visit. Pt vu    Lab Results   Component Value Date    WBC 10.64 12/23/2020    HGB 14.1 12/23/2020    HCT 40.5 12/23/2020    MCV 84.7 12/23/2020     (L) 12/23/2020

## 2021-01-06 ENCOUNTER — OFFICE VISIT (OUTPATIENT)
Dept: ONCOLOGY | Facility: CLINIC | Age: 76
End: 2021-01-06

## 2021-01-06 ENCOUNTER — LAB (OUTPATIENT)
Dept: OTHER | Facility: HOSPITAL | Age: 76
End: 2021-01-06

## 2021-01-06 VITALS
TEMPERATURE: 97.1 F | OXYGEN SATURATION: 97 % | HEIGHT: 65 IN | DIASTOLIC BLOOD PRESSURE: 89 MMHG | WEIGHT: 150.9 LBS | BODY MASS INDEX: 25.14 KG/M2 | RESPIRATION RATE: 16 BRPM | SYSTOLIC BLOOD PRESSURE: 148 MMHG | HEART RATE: 78 BPM

## 2021-01-06 DIAGNOSIS — D69.3 IMMUNE THROMBOCYTOPENIC PURPURA (HCC): ICD-10-CM

## 2021-01-06 DIAGNOSIS — D51.3 OTHER DIETARY VITAMIN B12 DEFICIENCY ANEMIA: ICD-10-CM

## 2021-01-06 DIAGNOSIS — D69.3 IMMUNE THROMBOCYTOPENIC PURPURA (HCC): Primary | ICD-10-CM

## 2021-01-06 DIAGNOSIS — D50.9 IRON DEFICIENCY ANEMIA, UNSPECIFIED IRON DEFICIENCY ANEMIA TYPE: ICD-10-CM

## 2021-01-06 LAB
BASOPHILS # BLD AUTO: 0.06 10*3/MM3 (ref 0–0.2)
BASOPHILS NFR BLD AUTO: 0.5 % (ref 0–1.5)
DEPRECATED RDW RBC AUTO: 55.8 FL (ref 37–54)
EOSINOPHIL # BLD AUTO: 0.01 10*3/MM3 (ref 0–0.4)
EOSINOPHIL NFR BLD AUTO: 0.1 % (ref 0.3–6.2)
ERYTHROCYTE [DISTWIDTH] IN BLOOD BY AUTOMATED COUNT: 17.1 % (ref 12.3–15.4)
FERRITIN SERPL-MCNC: 57.9 NG/ML (ref 13–150)
HCT VFR BLD AUTO: 43.8 % (ref 34–46.6)
HGB BLD-MCNC: 14.8 G/DL (ref 12–15.9)
IMM GRANULOCYTES # BLD AUTO: 0.58 10*3/MM3 (ref 0–0.05)
IMM GRANULOCYTES NFR BLD AUTO: 4.4 % (ref 0–0.5)
IRON 24H UR-MRATE: 121 MCG/DL (ref 37–145)
IRON SATN MFR SERPL: 36 % (ref 20–50)
LYMPHOCYTES # BLD AUTO: 1.59 10*3/MM3 (ref 0.7–3.1)
LYMPHOCYTES NFR BLD AUTO: 11.9 % (ref 19.6–45.3)
MCH RBC QN AUTO: 29.9 PG (ref 26.6–33)
MCHC RBC AUTO-ENTMCNC: 33.8 G/DL (ref 31.5–35.7)
MCV RBC AUTO: 88.5 FL (ref 79–97)
MONOCYTES # BLD AUTO: 0.43 10*3/MM3 (ref 0.1–0.9)
MONOCYTES NFR BLD AUTO: 3.2 % (ref 5–12)
NEUTROPHILS NFR BLD AUTO: 10.64 10*3/MM3 (ref 1.7–7)
NEUTROPHILS NFR BLD AUTO: 79.9 % (ref 42.7–76)
NRBC BLD AUTO-RTO: 0 /100 WBC (ref 0–0.2)
PLAT MORPH BLD: NORMAL
PLATELET # BLD AUTO: 159 10*3/MM3 (ref 140–450)
PMV BLD AUTO: 10.6 FL (ref 6–12)
RBC # BLD AUTO: 4.95 10*6/MM3 (ref 3.77–5.28)
RBC MORPH BLD: NORMAL
TIBC SERPL-MCNC: 337 MCG/DL (ref 298–536)
TRANSFERRIN SERPL-MCNC: 226 MG/DL (ref 200–360)
WBC # BLD AUTO: 13.31 10*3/MM3 (ref 3.4–10.8)
WBC MORPH BLD: NORMAL

## 2021-01-06 PROCEDURE — 84466 ASSAY OF TRANSFERRIN: CPT | Performed by: INTERNAL MEDICINE

## 2021-01-06 PROCEDURE — 82728 ASSAY OF FERRITIN: CPT | Performed by: INTERNAL MEDICINE

## 2021-01-06 PROCEDURE — 85025 COMPLETE CBC W/AUTO DIFF WBC: CPT | Performed by: INTERNAL MEDICINE

## 2021-01-06 PROCEDURE — 85007 BL SMEAR W/DIFF WBC COUNT: CPT | Performed by: INTERNAL MEDICINE

## 2021-01-06 PROCEDURE — 99214 OFFICE O/P EST MOD 30 MIN: CPT | Performed by: INTERNAL MEDICINE

## 2021-01-06 PROCEDURE — 83540 ASSAY OF IRON: CPT | Performed by: INTERNAL MEDICINE

## 2021-01-06 PROCEDURE — 36415 COLL VENOUS BLD VENIPUNCTURE: CPT

## 2021-01-06 RX ORDER — PREDNISONE 10 MG/1
10 TABLET ORAL DAILY
Qty: 90 TABLET | Refills: 1
Start: 2021-01-06 | End: 2021-03-11

## 2021-01-06 RX ORDER — FERROUS SULFATE 325(65) MG
325 TABLET ORAL WEEKLY
Start: 2021-01-06 | End: 2021-04-28

## 2021-01-06 NOTE — PROGRESS NOTES
Subjective     CHIEF COMPLAINT:      Chief Complaint   Patient presents with   • Follow-up     fatigue       HISTORY OF PRESENT ILLNESS:     Karly Mcgrath is a 75 y.o. female patient who returns today for follow up on her ITP. She is on prednisone therapy currently taking 20 mg a day. The dose was reduce to 10 mg a day 4 weeks ago. However, the platelet count decreased from 168,000 to 124,000 on 12/23/2020. Therefore, we increased the dose of Prednisone back to 20 mg a day. The patient is having fluid retention. She gained about 20 lbs since the start of prednisone therapy.    Patient reports fatigue. She is taking oral iron once daily.     Past medical, surgical, social and family history reviewed.     MEDICATIONS:    Current Outpatient Medications:   •  atorvastatin (LIPITOR) 10 MG tablet, Take 1 tablet by mouth Daily., Disp: 90 tablet, Rfl: 3  •  busPIRone (BUSPAR) 5 MG tablet, TAKE ONE TABLET BY MOUTH TWO TIMES A DAY FOR ANXIETY, Disp: 180 tablet, Rfl: 2  •  Calcium Carb-Cholecalciferol (CALCIUM 600 + D) 600-200 MG-UNIT tablet, Take 2,000 mg by mouth 2 (Two) Times a Day., Disp: , Rfl:   •  cholecalciferol (VITAMIN D3) 1000 units tablet, Take 2,000 Units by mouth Daily., Disp: , Rfl:   •  Cyanocobalamin (VITAMIN B 12 PO), Take 1,000 mcg by mouth 1 (One) Time Per Week., Disp: , Rfl:   •  ferrous sulfate 325 (65 FE) MG tablet, Take 1 tablet by mouth Daily., Disp: , Rfl:   •  levothyroxine (SYNTHROID, LEVOTHROID) 50 MCG tablet, Take 1 tablet by mouth Daily., Disp: 90 tablet, Rfl: 2  •  losartan (COZAAR) 50 MG tablet, TAKE ONE TABLET BY MOUTH DAILY, Disp: 90 tablet, Rfl: 1  •  metoprolol succinate XL (TOPROL-XL) 50 MG 24 hr tablet, Take 1 tablet by mouth Daily., Disp: 90 tablet, Rfl: 3  •  omeprazole (priLOSEC) 40 MG capsule, Take 40 mg by mouth As Needed. Only takes if prednisone upsets stomach. Hasn't needed it so far.., Disp: , Rfl:   •  predniSONE (DELTASONE) 10 MG tablet, Take 2 tablets by mouth Daily., Disp:  "90 tablet, Rfl: 1  •  acyclovir (ZOVIRAX) 200 MG capsule, Take 1 capsule by mouth 5 (Five) Times a Day. (Patient taking differently: Take 200 mg by mouth 5 (Five) Times a Day As Needed (cold sores).), Disp: 25 capsule, Rfl: 2  •  acyclovir (ZOVIRAX) 5 % ointment, Apply  topically to the appropriate area as directed 5 (Five) Times a Day As Needed (cold sore)., Disp: 15 g, Rfl: 1    Current Facility-Administered Medications:   •  cyanocobalamin injection 1,000 mcg, 1,000 mcg, Intramuscular, Weekly, Vito Adame MD, 1,000 mcg at 03/09/20 1041    Objective   VITAL SIGNS:     Vitals:    01/06/21 1304   BP: 148/89   Pulse: 78   Resp: 16   Temp: 97.1 °F (36.2 °C)   TempSrc: Temporal   SpO2: 97%   Weight: 68.4 kg (150 lb 14.4 oz)   Height: 165.1 cm (65\")   PainSc: 0-No pain     Body mass index is 25.11 kg/m².     Wt Readings from Last 5 Encounters:   01/06/21 68.4 kg (150 lb 14.4 oz)   12/23/20 67.9 kg (149 lb 9.6 oz)   12/09/20 67.2 kg (148 lb 3.2 oz)   11/24/20 66.3 kg (146 lb 3.2 oz)   11/12/20 64.5 kg (142 lb 1.6 oz)       PHYSICAL EXAMINATION:   GENERAL: The patient appears in good general condition, not in acute distress.   SKIN: No ecchymosis.  EYES: No jaundice.  LYMPHATICS: No cervical, supraclavicular or axillary lymphadenopathy.  ABDOMEN: Soft. No tenderness. No Hepatomegaly. No Splenomegaly. No masses.  EXTREMITIES: Trace leg edema.       DIAGNOSTIC DATA:     Results from last 7 days   Lab Units 01/06/21  1249   WBC 10*3/mm3 13.31*   NEUTROS ABS 10*3/mm3 10.64*   HEMOGLOBIN g/dL 14.8   HEMATOCRIT % 43.8   PLATELETS 10*3/mm3 159       Component      Latest Ref Rng & Units 9/30/2020 11/11/2020 1/6/2021   Iron      37 - 145 mcg/dL 38 55 121   Iron Saturation      20 - 50 % 9 (L) 16 (L) 36   Transferrin      200 - 360 mg/dL 297 227 226   TIBC      298 - 536 mcg/dL 443 338 337   Methylmalonic Acid      0 - 378 nmol/L 197     DISCLAIMER       Comment     Vitamin B-12      211 - 946 pg/mL 1,461 (H) 1,947 (H)  "   Folate      4.78 - 24.20 ng/mL 8.35     Ferritin      13.00 - 150.00 ng/mL 14.70 52.80 57.90         Assessment/Plan   *Acute thrombocytopenia diagnosed on 9/30/2020.    · Patient has prior history of chronic thrombocytopenia.  Platelet count was 119,000 on 8/28/2019.   · Platelet count decreased to 44,000 on 9/10/2020.   · Platelet count decreased to 11,000 on 9/30/2020.   · There was no evidence of leukemia or lymphoma on flow cytometry on 9/30/2020.  · Patient was started on prednisone 1 mg/kg daily on 9/30/2020.  She responded with improvement in the platelet count to 61,000 on 10/5/2020.    · Platelet count remains >150,000. The dose of prednisone has been gradually tapered with weekly monitoring of her platelet count.   · The dose of prednisone was slowly tapered.  · After the dose was reduced to 10 mg a day, platelets decreased to 124,000 on 12/23/2020.  · The dose was increased back to 20 mg a day on 12/23/2020.  · Platelet count improved 159,000 today.  · Recommended reducing the dose of prednisone to 10 mg daily.  · We will monitor CBC every 2 weeks and continue to taper the prednisone dose.    *Iron deficiency anemia.  · Hemoglobin was 11.1 on 9/10/2020.  · Patient was started on ferrous sulfate 325 mg daily on 9/30/2020.  · Hemoglobin improved to 13.6 on 10/14/2020.  · Hemoglobin improved to 14.8 today.  · Iron stores improved on today's labs.    *History of vitamin B12 deficiency.  · Patient was on B12 1000 mcg orally daily.  · B12 level became elevated at 1947 on 11/11/2020.  · B12 was reduced to once weekly on 10/14/2020.       PLAN:    1.  Reduce prednisone to 10 mg a day.  2.  Reduce ferrous sulfate to once weekly.  3.  Continue B12 once weekly.  4.  Return every 2 weeks for CBC with RN review.  Our plan is to slowly taper  the prednisone dose.  I will see her in follow-up in 6 weeks.  5.  If the patient has a relapse while the dose of prednisone is being tapered, we will consider Rituxan.          Garcia Muro MD  01/06/21

## 2021-01-20 ENCOUNTER — CLINICAL SUPPORT (OUTPATIENT)
Dept: ONCOLOGY | Facility: HOSPITAL | Age: 76
End: 2021-01-20

## 2021-01-20 ENCOUNTER — TELEPHONE (OUTPATIENT)
Dept: ONCOLOGY | Facility: HOSPITAL | Age: 76
End: 2021-01-20

## 2021-01-20 ENCOUNTER — LAB (OUTPATIENT)
Dept: OTHER | Facility: HOSPITAL | Age: 76
End: 2021-01-20

## 2021-01-20 VITALS
DIASTOLIC BLOOD PRESSURE: 91 MMHG | RESPIRATION RATE: 18 BRPM | SYSTOLIC BLOOD PRESSURE: 179 MMHG | HEART RATE: 80 BPM | WEIGHT: 151.6 LBS | OXYGEN SATURATION: 97 % | BODY MASS INDEX: 25.26 KG/M2 | TEMPERATURE: 97.7 F | HEIGHT: 65 IN

## 2021-01-20 DIAGNOSIS — D69.3 IMMUNE THROMBOCYTOPENIC PURPURA (HCC): ICD-10-CM

## 2021-01-20 DIAGNOSIS — D50.9 IRON DEFICIENCY ANEMIA, UNSPECIFIED IRON DEFICIENCY ANEMIA TYPE: ICD-10-CM

## 2021-01-20 LAB
BASOPHILS # BLD AUTO: 0.04 10*3/MM3 (ref 0–0.2)
BASOPHILS NFR BLD AUTO: 0.4 % (ref 0–1.5)
DEPRECATED RDW RBC AUTO: 50 FL (ref 37–54)
EOSINOPHIL # BLD AUTO: 0.04 10*3/MM3 (ref 0–0.4)
EOSINOPHIL NFR BLD AUTO: 0.4 % (ref 0.3–6.2)
ERYTHROCYTE [DISTWIDTH] IN BLOOD BY AUTOMATED COUNT: 15.5 % (ref 12.3–15.4)
HCT VFR BLD AUTO: 39.1 % (ref 34–46.6)
HGB BLD-MCNC: 13.6 G/DL (ref 12–15.9)
IMM GRANULOCYTES # BLD AUTO: 0.19 10*3/MM3 (ref 0–0.05)
IMM GRANULOCYTES NFR BLD AUTO: 1.9 % (ref 0–0.5)
LYMPHOCYTES # BLD AUTO: 1.22 10*3/MM3 (ref 0.7–3.1)
LYMPHOCYTES NFR BLD AUTO: 12.4 % (ref 19.6–45.3)
MCH RBC QN AUTO: 30.6 PG (ref 26.6–33)
MCHC RBC AUTO-ENTMCNC: 34.8 G/DL (ref 31.5–35.7)
MCV RBC AUTO: 88.1 FL (ref 79–97)
MONOCYTES # BLD AUTO: 0.42 10*3/MM3 (ref 0.1–0.9)
MONOCYTES NFR BLD AUTO: 4.3 % (ref 5–12)
NEUTROPHILS NFR BLD AUTO: 7.96 10*3/MM3 (ref 1.7–7)
NEUTROPHILS NFR BLD AUTO: 80.6 % (ref 42.7–76)
NRBC BLD AUTO-RTO: 0 /100 WBC (ref 0–0.2)
PLATELET # BLD AUTO: 131 10*3/MM3 (ref 140–450)
PMV BLD AUTO: 10.9 FL (ref 6–12)
RBC # BLD AUTO: 4.44 10*6/MM3 (ref 3.77–5.28)
WBC # BLD AUTO: 9.87 10*3/MM3 (ref 3.4–10.8)

## 2021-01-20 PROCEDURE — 85025 COMPLETE CBC W/AUTO DIFF WBC: CPT | Performed by: INTERNAL MEDICINE

## 2021-01-20 PROCEDURE — 36415 COLL VENOUS BLD VENIPUNCTURE: CPT

## 2021-01-20 NOTE — NURSING NOTE
Here for CBC with nurse review. This nurse informed her that she would be called after results were discussed with Dr. Muro and to this she was in agreement.   WBC   Date Value Ref Range Status   01/20/2021 9.87 3.40 - 10.80 10*3/mm3 Final   03/09/2020 7.05 3.40 - 10.80 10*3/mm3 Final     RBC   Date Value Ref Range Status   01/20/2021 4.44 3.77 - 5.28 10*6/mm3 Final   03/09/2020 4.42 3.77 - 5.28 10*6/mm3 Final     Hemoglobin   Date Value Ref Range Status   01/20/2021 13.6 12.0 - 15.9 g/dL Final     Hematocrit   Date Value Ref Range Status   01/20/2021 39.1 34.0 - 46.6 % Final     MCV   Date Value Ref Range Status   01/20/2021 88.1 79.0 - 97.0 fL Final     MCH   Date Value Ref Range Status   01/20/2021 30.6 26.6 - 33.0 pg Final     MCHC   Date Value Ref Range Status   01/20/2021 34.8 31.5 - 35.7 g/dL Final     RDW   Date Value Ref Range Status   01/20/2021 15.5 (H) 12.3 - 15.4 % Final     RDW-SD   Date Value Ref Range Status   01/20/2021 50.0 37.0 - 54.0 fl Final     MPV   Date Value Ref Range Status   01/20/2021 10.9 6.0 - 12.0 fL Final     Platelets   Date Value Ref Range Status   01/20/2021 131 (L) 140 - 450 10*3/mm3 Final     Platelets (Citrated Blood)   Date Value Ref Range Status   09/30/2020 10 (C) 140 - 450 10*3/mm3 Final     Neutrophil %   Date Value Ref Range Status   01/20/2021 80.6 (H) 42.7 - 76.0 % Final     Lymphocyte %   Date Value Ref Range Status   01/20/2021 12.4 (L) 19.6 - 45.3 % Final     Monocyte %   Date Value Ref Range Status   01/20/2021 4.3 (L) 5.0 - 12.0 % Final     Eosinophil %   Date Value Ref Range Status   01/20/2021 0.4 0.3 - 6.2 % Final     Basophil %   Date Value Ref Range Status   01/20/2021 0.4 0.0 - 1.5 % Final     Immature Grans %   Date Value Ref Range Status   01/20/2021 1.9 (H) 0.0 - 0.5 % Final     Neutrophils, Absolute   Date Value Ref Range Status   01/20/2021 7.96 (H) 1.70 - 7.00 10*3/mm3 Final     Lymphocytes, Absolute   Date Value Ref Range Status   01/20/2021 1.22  0.70 - 3.10 10*3/mm3 Final     Monocytes, Absolute   Date Value Ref Range Status   01/20/2021 0.42 0.10 - 0.90 10*3/mm3 Final     Eosinophils, Absolute   Date Value Ref Range Status   01/20/2021 0.04 0.00 - 0.40 10*3/mm3 Final     Basophils, Absolute   Date Value Ref Range Status   01/20/2021 0.04 0.00 - 0.20 10*3/mm3 Final     Immature Grans, Absolute   Date Value Ref Range Status   01/20/2021 0.19 (H) 0.00 - 0.05 10*3/mm3 Final     nRBC   Date Value Ref Range Status   01/20/2021 0.0 0.0 - 0.2 /100 WBC Final     Dr. Muro reviewed the results with instructions for her to decrease the prednisone to 5mg daily (fac84ia daily). Dr. Muro also gave permission for her to receive the COVID-19 vaccine.   This nurse called her and discussed the info above as well as reviewed the f/u appointment for 02- at 1200. U/V.

## 2021-02-03 ENCOUNTER — LAB (OUTPATIENT)
Dept: OTHER | Facility: HOSPITAL | Age: 76
End: 2021-02-03

## 2021-02-03 ENCOUNTER — CLINICAL SUPPORT (OUTPATIENT)
Dept: ONCOLOGY | Facility: HOSPITAL | Age: 76
End: 2021-02-03

## 2021-02-03 VITALS
BODY MASS INDEX: 25.45 KG/M2 | RESPIRATION RATE: 18 BRPM | DIASTOLIC BLOOD PRESSURE: 78 MMHG | TEMPERATURE: 97.3 F | WEIGHT: 152.74 LBS | HEART RATE: 73 BPM | SYSTOLIC BLOOD PRESSURE: 179 MMHG | HEIGHT: 65 IN | OXYGEN SATURATION: 97 %

## 2021-02-03 DIAGNOSIS — D50.9 IRON DEFICIENCY ANEMIA, UNSPECIFIED IRON DEFICIENCY ANEMIA TYPE: ICD-10-CM

## 2021-02-03 DIAGNOSIS — D69.3 IMMUNE THROMBOCYTOPENIC PURPURA (HCC): ICD-10-CM

## 2021-02-03 LAB
BASOPHILS # BLD AUTO: 0.04 10*3/MM3 (ref 0–0.2)
BASOPHILS NFR BLD AUTO: 0.5 % (ref 0–1.5)
DEPRECATED RDW RBC AUTO: 46.8 FL (ref 37–54)
EOSINOPHIL # BLD AUTO: 0.08 10*3/MM3 (ref 0–0.4)
EOSINOPHIL NFR BLD AUTO: 0.9 % (ref 0.3–6.2)
ERYTHROCYTE [DISTWIDTH] IN BLOOD BY AUTOMATED COUNT: 14.5 % (ref 12.3–15.4)
HCT VFR BLD AUTO: 37 % (ref 34–46.6)
HGB BLD-MCNC: 13 G/DL (ref 12–15.9)
IMM GRANULOCYTES # BLD AUTO: 0.12 10*3/MM3 (ref 0–0.05)
IMM GRANULOCYTES NFR BLD AUTO: 1.4 % (ref 0–0.5)
LYMPHOCYTES # BLD AUTO: 1.51 10*3/MM3 (ref 0.7–3.1)
LYMPHOCYTES NFR BLD AUTO: 17.6 % (ref 19.6–45.3)
MCH RBC QN AUTO: 31.5 PG (ref 26.6–33)
MCHC RBC AUTO-ENTMCNC: 35.1 G/DL (ref 31.5–35.7)
MCV RBC AUTO: 89.6 FL (ref 79–97)
MONOCYTES # BLD AUTO: 0.62 10*3/MM3 (ref 0.1–0.9)
MONOCYTES NFR BLD AUTO: 7.2 % (ref 5–12)
NEUTROPHILS NFR BLD AUTO: 6.2 10*3/MM3 (ref 1.7–7)
NEUTROPHILS NFR BLD AUTO: 72.4 % (ref 42.7–76)
NRBC BLD AUTO-RTO: 0 /100 WBC (ref 0–0.2)
PLATELET # BLD AUTO: 134 10*3/MM3 (ref 140–450)
PMV BLD AUTO: 12 FL (ref 6–12)
RBC # BLD AUTO: 4.13 10*6/MM3 (ref 3.77–5.28)
WBC # BLD AUTO: 8.57 10*3/MM3 (ref 3.4–10.8)

## 2021-02-03 PROCEDURE — 85025 COMPLETE CBC W/AUTO DIFF WBC: CPT | Performed by: INTERNAL MEDICINE

## 2021-02-03 PROCEDURE — G0463 HOSPITAL OUTPT CLINIC VISIT: HCPCS

## 2021-02-03 PROCEDURE — 36415 COLL VENOUS BLD VENIPUNCTURE: CPT

## 2021-02-03 NOTE — NURSING NOTE
Patient arrived ambulatory for lab and RN review. Patient is now taking Prednisone 5 mg PO daily and oral B12 once weekly. Patient has no complaints. Patient's labs reviewed with patient and patient v/u if any concerns arise before next appointment to contact physician.  Lab Results   Component Value Date    WBC 8.57 02/03/2021    HGB 13.0 02/03/2021    HCT 37.0 02/03/2021    MCV 89.6 02/03/2021     (L) 02/03/2021     Lab Results   Component Value Date    NEUTROABS 6.20 02/03/2021

## 2021-02-12 RX ORDER — METOPROLOL SUCCINATE 50 MG/1
TABLET, EXTENDED RELEASE ORAL
Qty: 90 TABLET | Refills: 2 | Status: SHIPPED | OUTPATIENT
Start: 2021-02-12 | End: 2021-11-09

## 2021-02-17 ENCOUNTER — LAB (OUTPATIENT)
Dept: OTHER | Facility: HOSPITAL | Age: 76
End: 2021-02-17

## 2021-02-17 ENCOUNTER — OFFICE VISIT (OUTPATIENT)
Dept: ONCOLOGY | Facility: CLINIC | Age: 76
End: 2021-02-17

## 2021-02-17 VITALS
OXYGEN SATURATION: 98 % | RESPIRATION RATE: 16 BRPM | BODY MASS INDEX: 25.39 KG/M2 | DIASTOLIC BLOOD PRESSURE: 82 MMHG | WEIGHT: 152.4 LBS | HEART RATE: 72 BPM | HEIGHT: 65 IN | SYSTOLIC BLOOD PRESSURE: 174 MMHG | TEMPERATURE: 97.1 F

## 2021-02-17 DIAGNOSIS — D50.9 IRON DEFICIENCY ANEMIA, UNSPECIFIED IRON DEFICIENCY ANEMIA TYPE: ICD-10-CM

## 2021-02-17 DIAGNOSIS — D69.3 IMMUNE THROMBOCYTOPENIC PURPURA (HCC): Primary | ICD-10-CM

## 2021-02-17 DIAGNOSIS — D51.3 OTHER DIETARY VITAMIN B12 DEFICIENCY ANEMIA: ICD-10-CM

## 2021-02-17 DIAGNOSIS — D69.3 IMMUNE THROMBOCYTOPENIC PURPURA (HCC): ICD-10-CM

## 2021-02-17 LAB
BASOPHILS # BLD AUTO: 0.04 10*3/MM3 (ref 0–0.2)
BASOPHILS NFR BLD AUTO: 0.5 % (ref 0–1.5)
DEPRECATED RDW RBC AUTO: 45.6 FL (ref 37–54)
EOSINOPHIL # BLD AUTO: 0.15 10*3/MM3 (ref 0–0.4)
EOSINOPHIL NFR BLD AUTO: 1.9 % (ref 0.3–6.2)
ERYTHROCYTE [DISTWIDTH] IN BLOOD BY AUTOMATED COUNT: 13.7 % (ref 12.3–15.4)
HCT VFR BLD AUTO: 40.6 % (ref 34–46.6)
HGB BLD-MCNC: 13.9 G/DL (ref 12–15.9)
IMM GRANULOCYTES # BLD AUTO: 0.1 10*3/MM3 (ref 0–0.05)
IMM GRANULOCYTES NFR BLD AUTO: 1.3 % (ref 0–0.5)
LYMPHOCYTES # BLD AUTO: 2.69 10*3/MM3 (ref 0.7–3.1)
LYMPHOCYTES NFR BLD AUTO: 34.8 % (ref 19.6–45.3)
MCH RBC QN AUTO: 31.2 PG (ref 26.6–33)
MCHC RBC AUTO-ENTMCNC: 34.2 G/DL (ref 31.5–35.7)
MCV RBC AUTO: 91 FL (ref 79–97)
MONOCYTES # BLD AUTO: 0.68 10*3/MM3 (ref 0.1–0.9)
MONOCYTES NFR BLD AUTO: 8.8 % (ref 5–12)
NEUTROPHILS NFR BLD AUTO: 4.06 10*3/MM3 (ref 1.7–7)
NEUTROPHILS NFR BLD AUTO: 52.7 % (ref 42.7–76)
NRBC BLD AUTO-RTO: 0 /100 WBC (ref 0–0.2)
PLATELET # BLD AUTO: 141 10*3/MM3 (ref 140–450)
PMV BLD AUTO: 12.5 FL (ref 6–12)
RBC # BLD AUTO: 4.46 10*6/MM3 (ref 3.77–5.28)
WBC # BLD AUTO: 7.72 10*3/MM3 (ref 3.4–10.8)

## 2021-02-17 PROCEDURE — 99214 OFFICE O/P EST MOD 30 MIN: CPT | Performed by: INTERNAL MEDICINE

## 2021-02-17 PROCEDURE — 85025 COMPLETE CBC W/AUTO DIFF WBC: CPT | Performed by: INTERNAL MEDICINE

## 2021-02-17 PROCEDURE — 36415 COLL VENOUS BLD VENIPUNCTURE: CPT

## 2021-02-17 NOTE — PROGRESS NOTES
Subjective     CHIEF COMPLAINT:      Chief Complaint   Patient presents with   • Follow-up     no concerns       HISTORY OF PRESENT ILLNESS:     Karly Mcgrath is a 75 y.o. female patient who returns today for follow up on on her thrombocytopenia and anemia.  She is currently on prednisone 5 mg daily.  She is tolerating it.  She is not having problem with bleeding or bruising.    Patient reports episodes of swelling of the legs.  No pain in the calves.  No skin color changes.    Past medical, surgical, social and family history reviewed.     MEDICATIONS:    Current Outpatient Medications:   •  acyclovir (ZOVIRAX) 200 MG capsule, Take 1 capsule by mouth 5 (Five) Times a Day. (Patient taking differently: Take 200 mg by mouth 5 (Five) Times a Day As Needed (cold sores).), Disp: 25 capsule, Rfl: 2  •  atorvastatin (LIPITOR) 10 MG tablet, Take 1 tablet by mouth Daily., Disp: 90 tablet, Rfl: 3  •  busPIRone (BUSPAR) 5 MG tablet, TAKE ONE TABLET BY MOUTH TWO TIMES A DAY FOR ANXIETY, Disp: 180 tablet, Rfl: 2  •  Calcium Carb-Cholecalciferol (CALCIUM 600 + D) 600-200 MG-UNIT tablet, Take 2,000 mg by mouth 2 (Two) Times a Day., Disp: , Rfl:   •  cholecalciferol (VITAMIN D3) 1000 units tablet, Take 2,000 Units by mouth Daily., Disp: , Rfl:   •  Cyanocobalamin (VITAMIN B 12 PO), Take 1,000 mcg by mouth 1 (One) Time Per Week., Disp: , Rfl:   •  ferrous sulfate 325 (65 FE) MG tablet, Take 1 tablet by mouth 1 (One) Time Per Week., Disp: , Rfl:   •  levothyroxine (SYNTHROID, LEVOTHROID) 50 MCG tablet, Take 1 tablet by mouth Daily., Disp: 90 tablet, Rfl: 2  •  losartan (COZAAR) 50 MG tablet, TAKE ONE TABLET BY MOUTH DAILY, Disp: 90 tablet, Rfl: 1  •  metoprolol succinate XL (TOPROL-XL) 50 MG 24 hr tablet, TAKE ONE TABLET BY MOUTH DAILY, Disp: 90 tablet, Rfl: 2  •  omeprazole (priLOSEC) 40 MG capsule, Take 40 mg by mouth As Needed. Only takes if prednisone upsets stomach. Hasn't needed it so far.., Disp: , Rfl:   •  predniSONE  "(DELTASONE) 10 MG tablet, Take 1 tablet by mouth Daily. (Patient taking differently: Take 5 mg by mouth Daily. Started last visit per patient.), Disp: 90 tablet, Rfl: 1  •  acyclovir (ZOVIRAX) 5 % ointment, Apply  topically to the appropriate area as directed 5 (Five) Times a Day As Needed (cold sore)., Disp: 15 g, Rfl: 1    Current Facility-Administered Medications:   •  cyanocobalamin injection 1,000 mcg, 1,000 mcg, Intramuscular, Weekly, Vito Adame MD, 1,000 mcg at 03/09/20 1041    Objective   VITAL SIGNS:     Vitals:    02/17/21 0818   BP: 174/82   Pulse: 72   Resp: 16   Temp: 97.1 °F (36.2 °C)   TempSrc: Temporal   SpO2: 98%   Weight: 69.1 kg (152 lb 6.4 oz)   Height: 165.1 cm (65\")   PainSc: 0-No pain     Body mass index is 25.36 kg/m².     Wt Readings from Last 5 Encounters:   02/17/21 69.1 kg (152 lb 6.4 oz)   02/03/21 69.3 kg (152 lb 11.8 oz)   01/20/21 68.8 kg (151 lb 9.6 oz)   01/06/21 68.4 kg (150 lb 14.4 oz)   12/23/20 67.9 kg (149 lb 9.6 oz)       PHYSICAL EXAMINATION:   GENERAL: The patient appears in good general condition, not in acute distress.   SKIN: No ecchymosis.  EYES: No jaundice.  LYMPHATICS: No cervical lymphadenopathy.  CHEST: Normal respiratory effort.  CARDIAC: No edema  EXTREMITIES: No erythema or warmth.  No calf tenderness.         DIAGNOSTIC DATA:     Results from last 7 days   Lab Units 02/17/21  0809   WBC 10*3/mm3 7.72   NEUTROS ABS 10*3/mm3 4.06   HEMOGLOBIN g/dL 13.9   HEMATOCRIT % 40.6   PLATELETS 10*3/mm3 141         Assessment/Plan   *Acute thrombocytopenia diagnosed on 9/30/2020.    · Patient has prior history of chronic thrombocytopenia.  Platelet count was 119,000 on 8/28/2019.   · Platelet count decreased to 44,000 on 9/10/2020.   · Platelet count decreased to 11,000 on 9/30/2020.   · There was no evidence of leukemia or lymphoma on flow cytometry on 9/30/2020.  · Patient was started on prednisone 1 mg/kg daily on 9/30/2020.  She responded with improvement in the " platelet count to 61,000 on 10/5/2020.    · Platelet count remains >150,000. The dose of prednisone has been gradually tapered with weekly monitoring of her platelet count.   · The dose of prednisone was slowly tapered.  · After the dose was reduced to 10 mg a day, platelets decreased to 124,000 on 12/23/2020.  · The dose was increased back to 20 mg a day on 12/23/2020.  · Platelet count improved 159,000 on 1/6/2021.  · We reduce the dose of prednisone to 10 mg daily on 1/6/2021 and the dose has been slowly tapered since that time.  · Patient is currently on 5 mg daily.  Platelet count is 141,000 today.  · Patient is not having problems with bleeding.  She is having some fluid retention secondary to prednisone.    *Iron deficiency anemia.  · Hemoglobin was 11.1 on 9/10/2020.  · Patient was started on ferrous sulfate 325 mg daily on 9/30/2020.  · Hemoglobin improved to 13.6 on 10/14/2020.  · Hemoglobin improved to 14.8 on 1/6/2021. Iron stores improved.   · Ferrous sulfate changed to once weekly on 1/6/2021.  · Patient is tolerating the ferrous sulfate without any side effects.  · Hemoglobin is normal at 13.9 today.    *History of vitamin B12 deficiency.  · Patient was on B12 1000 mcg orally daily.  · B12 level became elevated at 1947 on 11/11/2020.  · B12 was reduced to once weekly on 10/14/2020.   · Patient is not having problems with fatigue at present.      PLAN:    1.  Reduce prednisone to 5 mg every other day for 2 weeks and then discontinue it (around 3/3/2021).  2.  Return monthly for CBC with RN review.  3.  Follow-up in 4 months with CBC ferritin iron panel and B12 levels.  4.  I recommended to the patient that she receives the COVID-19 vaccine.        Garcia Muro MD  02/17/21

## 2021-03-01 RX ORDER — ATORVASTATIN CALCIUM 10 MG/1
TABLET, FILM COATED ORAL
Qty: 90 TABLET | Refills: 2 | Status: SHIPPED | OUTPATIENT
Start: 2021-03-01 | End: 2021-11-24

## 2021-03-09 DIAGNOSIS — Z23 IMMUNIZATION DUE: ICD-10-CM

## 2021-03-11 ENCOUNTER — OFFICE VISIT (OUTPATIENT)
Dept: INTERNAL MEDICINE | Facility: CLINIC | Age: 76
End: 2021-03-11

## 2021-03-11 ENCOUNTER — TELEPHONE (OUTPATIENT)
Dept: INTERNAL MEDICINE | Facility: CLINIC | Age: 76
End: 2021-03-11

## 2021-03-11 ENCOUNTER — LAB (OUTPATIENT)
Dept: LAB | Facility: HOSPITAL | Age: 76
End: 2021-03-11

## 2021-03-11 VITALS
DIASTOLIC BLOOD PRESSURE: 82 MMHG | BODY MASS INDEX: 25.33 KG/M2 | OXYGEN SATURATION: 99 % | HEIGHT: 65 IN | SYSTOLIC BLOOD PRESSURE: 160 MMHG | HEART RATE: 83 BPM | WEIGHT: 152 LBS

## 2021-03-11 DIAGNOSIS — E03.9 HYPOTHYROIDISM, UNSPECIFIED TYPE: ICD-10-CM

## 2021-03-11 DIAGNOSIS — I10 ESSENTIAL HYPERTENSION: Primary | ICD-10-CM

## 2021-03-11 DIAGNOSIS — E78.2 MIXED HYPERLIPIDEMIA: ICD-10-CM

## 2021-03-11 LAB
T4 FREE SERPL-MCNC: 1.09 NG/DL (ref 0.93–1.7)
TSH SERPL DL<=0.05 MIU/L-ACNC: 4.69 UIU/ML (ref 0.27–4.2)

## 2021-03-11 PROCEDURE — 84443 ASSAY THYROID STIM HORMONE: CPT | Performed by: FAMILY MEDICINE

## 2021-03-11 PROCEDURE — 99214 OFFICE O/P EST MOD 30 MIN: CPT | Performed by: FAMILY MEDICINE

## 2021-03-11 PROCEDURE — 84439 ASSAY OF FREE THYROXINE: CPT | Performed by: FAMILY MEDICINE

## 2021-03-11 PROCEDURE — 36415 COLL VENOUS BLD VENIPUNCTURE: CPT | Performed by: FAMILY MEDICINE

## 2021-03-11 RX ORDER — LOSARTAN POTASSIUM 100 MG/1
100 TABLET ORAL DAILY
Qty: 90 TABLET | Refills: 2 | Status: SHIPPED | OUTPATIENT
Start: 2021-03-11 | End: 2021-12-03

## 2021-03-11 NOTE — PROGRESS NOTES
"Chief Complaint  follow up to hyperlipidemia, follow up to hypothyroidism, and follow up to hypertension    Subjective          Karly Mcgrath presents to [facility]  History of Present Illness  Follow-up appointment for chronic disease management of hyperlipidemia hypothyroidism hypertension she is increased her losartan 100 mg daily she has a normal blood pressure readings at home.  No chest pain shortness of breath or increased fatigue  No unwanted side effects of statin therapy and she feels that she has been stable on her levothyroxine 50 mcg daily she has had some fluctuating TSH and free T4 levels this past fall.  Objective   Vital Signs:   /82 (BP Location: Left arm, Patient Position: Sitting, Cuff Size: Adult)   Pulse 83   Ht 165.1 cm (65\")   Wt 68.9 kg (152 lb)   SpO2 99%   BMI 25.29 kg/m²     Physical Exam  Constitutional:       Appearance: Normal appearance.   HENT:      Head: Normocephalic and atraumatic.   Neck:      Comments: No enlarged thyroid felt  Cardiovascular:      Rate and Rhythm: Normal rate and regular rhythm.      Pulses: Normal pulses.      Heart sounds: Normal heart sounds.   Musculoskeletal:      Right lower leg: No edema.      Left lower leg: No edema.   Skin:     General: Skin is warm and dry.   Neurological:      Mental Status: She is alert.   Psychiatric:         Mood and Affect: Mood normal.         Behavior: Behavior normal.         Thought Content: Thought content normal.         Judgment: Judgment normal.        Result Review :     Common labs    Common Labsle 1/20/21 2/3/21 2/17/21   WBC 9.87 8.57 7.72   Hemoglobin 13.6 13.0 13.9   Hematocrit 39.1 37.0 40.6   Platelets 131 (A) 134 (A) 141   (A) Abnormal value                    Hematology wean down prednisone 1/2021  Assessment and Plan    Diagnoses and all orders for this visit:    1. Essential hypertension (Primary)    2. Mixed hyperlipidemia    3. Hypothyroidism, unspecified type  -   "   TSH  -     T4, Free    Other orders  -     losartan (COZAAR) 100 MG tablet; Take 1 tablet by mouth Daily.  Dispense: 90 tablet; Refill: 2    Hypertension confirmed losartan 100 mg daily  Hypothyroidism continue levothyroxine 50 mcg daily patient will call and confirm that the dose she has at home  Hyperlipidemia continue atorvastatin follow-up otherwise as needed results of blood work or    Follow Up   Return in about 6 months (around 9/11/2021), or if symptoms worsen or fail to improve, for Recheck, Annual physical.  Patient was given instructions and counseling regarding her condition or for health maintenance advice. Please see specific information pulled into the AVS if appropriate.

## 2021-03-11 NOTE — TELEPHONE ENCOUNTER
PATIENT STATED THAT DR. FRIEND TOLD HER TO CALL IN WITH THE MG SHE IS CURRENTLY TAKING OF HER BP MEDICATION.     PATIENT IS TAKING 50 MG OF LOSARTAN AND 50 MG OF LEVOTHYROXINE.      PATIENT CALL BACK: [phone]

## 2021-03-15 DIAGNOSIS — E03.9 HYPOTHYROIDISM, UNSPECIFIED TYPE: ICD-10-CM

## 2021-03-15 RX ORDER — LEVOTHYROXINE SODIUM 0.07 MG/1
TABLET ORAL
Qty: 30 TABLET | Refills: 0 | Status: SHIPPED | OUTPATIENT
Start: 2021-03-15 | End: 2021-11-05

## 2021-03-15 RX ORDER — LEVOTHYROXINE SODIUM 0.05 MG/1
TABLET ORAL
Qty: 45 TABLET | Refills: 2
Start: 2021-03-15 | End: 2022-02-08 | Stop reason: DRUGHIGH

## 2021-03-17 ENCOUNTER — LAB (OUTPATIENT)
Dept: OTHER | Facility: HOSPITAL | Age: 76
End: 2021-03-17

## 2021-03-17 ENCOUNTER — CLINICAL SUPPORT (OUTPATIENT)
Dept: ONCOLOGY | Facility: HOSPITAL | Age: 76
End: 2021-03-17

## 2021-03-17 VITALS
OXYGEN SATURATION: 97 % | TEMPERATURE: 97.3 F | WEIGHT: 151.2 LBS | HEART RATE: 72 BPM | BODY MASS INDEX: 25.19 KG/M2 | SYSTOLIC BLOOD PRESSURE: 167 MMHG | HEIGHT: 65 IN | DIASTOLIC BLOOD PRESSURE: 84 MMHG | RESPIRATION RATE: 18 BRPM

## 2021-03-17 DIAGNOSIS — D69.3 IMMUNE THROMBOCYTOPENIC PURPURA (HCC): ICD-10-CM

## 2021-03-17 DIAGNOSIS — D50.9 IRON DEFICIENCY ANEMIA, UNSPECIFIED IRON DEFICIENCY ANEMIA TYPE: ICD-10-CM

## 2021-03-17 DIAGNOSIS — D69.6 THROMBOCYTOPENIA (HCC): Primary | ICD-10-CM

## 2021-03-17 LAB
BASOPHILS # BLD AUTO: 0.03 10*3/MM3 (ref 0–0.2)
BASOPHILS NFR BLD AUTO: 0.5 % (ref 0–1.5)
DEPRECATED RDW RBC AUTO: 41.1 FL (ref 37–54)
EOSINOPHIL # BLD AUTO: 0.15 10*3/MM3 (ref 0–0.4)
EOSINOPHIL NFR BLD AUTO: 2.7 % (ref 0.3–6.2)
ERYTHROCYTE [DISTWIDTH] IN BLOOD BY AUTOMATED COUNT: 12.6 % (ref 12.3–15.4)
HCT VFR BLD AUTO: 37.8 % (ref 34–46.6)
HGB BLD-MCNC: 13.3 G/DL (ref 12–15.9)
IMM GRANULOCYTES # BLD AUTO: 0.03 10*3/MM3 (ref 0–0.05)
IMM GRANULOCYTES NFR BLD AUTO: 0.5 % (ref 0–0.5)
LYMPHOCYTES # BLD AUTO: 1.63 10*3/MM3 (ref 0.7–3.1)
LYMPHOCYTES NFR BLD AUTO: 29.1 % (ref 19.6–45.3)
MCH RBC QN AUTO: 31.3 PG (ref 26.6–33)
MCHC RBC AUTO-ENTMCNC: 35.2 G/DL (ref 31.5–35.7)
MCV RBC AUTO: 88.9 FL (ref 79–97)
MONOCYTES # BLD AUTO: 0.59 10*3/MM3 (ref 0.1–0.9)
MONOCYTES NFR BLD AUTO: 10.5 % (ref 5–12)
NEUTROPHILS NFR BLD AUTO: 3.17 10*3/MM3 (ref 1.7–7)
NEUTROPHILS NFR BLD AUTO: 56.7 % (ref 42.7–76)
NRBC BLD AUTO-RTO: 0 /100 WBC (ref 0–0.2)
PLATELET # BLD AUTO: 126 10*3/MM3 (ref 140–450)
PMV BLD AUTO: 12.6 FL (ref 6–12)
RBC # BLD AUTO: 4.25 10*6/MM3 (ref 3.77–5.28)
WBC # BLD AUTO: 5.6 10*3/MM3 (ref 3.4–10.8)

## 2021-03-17 PROCEDURE — 85025 COMPLETE CBC W/AUTO DIFF WBC: CPT | Performed by: INTERNAL MEDICINE

## 2021-03-17 PROCEDURE — G0463 HOSPITAL OUTPT CLINIC VISIT: HCPCS

## 2021-03-17 PROCEDURE — 36415 COLL VENOUS BLD VENIPUNCTURE: CPT

## 2021-03-17 NOTE — NURSING NOTE
Pt is here for lab with RN review.  CBC reviewed with pt, counts are stable for this pt at this time. Pt has no complaints and states that she completed her prednisone on 3/2/21. Platelet count did drop to 126K from 141K on last visit. Discussed CBC results with Dr Muro with instructions to have pt come back in two weeks for CBC and RN review. Pt informed and message sent to scheduling to make appt for pt and call with time and date.   Copy of labs given to pt. Pt is instructed to call the office with any concerns or new symptoms prior to next visit. Pt vu    Lab Results   Component Value Date    WBC 5.60 03/17/2021    HGB 13.3 03/17/2021    HCT 37.8 03/17/2021    MCV 88.9 03/17/2021     (L) 03/17/2021

## 2021-03-22 DIAGNOSIS — E03.9 HYPOTHYROIDISM, UNSPECIFIED TYPE: Primary | ICD-10-CM

## 2021-03-23 ENCOUNTER — LAB (OUTPATIENT)
Dept: LAB | Facility: HOSPITAL | Age: 76
End: 2021-03-23

## 2021-03-23 ENCOUNTER — OFFICE VISIT (OUTPATIENT)
Dept: INTERNAL MEDICINE | Facility: CLINIC | Age: 76
End: 2021-03-23

## 2021-03-23 VITALS
HEART RATE: 79 BPM | OXYGEN SATURATION: 98 % | BODY MASS INDEX: 25.11 KG/M2 | SYSTOLIC BLOOD PRESSURE: 176 MMHG | HEIGHT: 65 IN | DIASTOLIC BLOOD PRESSURE: 72 MMHG | WEIGHT: 150.7 LBS

## 2021-03-23 DIAGNOSIS — E03.9 HYPOTHYROIDISM, UNSPECIFIED TYPE: ICD-10-CM

## 2021-03-23 DIAGNOSIS — I10 ESSENTIAL HYPERTENSION: Primary | ICD-10-CM

## 2021-03-23 LAB
ALBUMIN SERPL-MCNC: 4 G/DL (ref 3.5–5.2)
ALBUMIN/GLOB SERPL: 1.6 G/DL
ALP SERPL-CCNC: 68 U/L (ref 39–117)
ALT SERPL W P-5'-P-CCNC: 15 U/L (ref 1–33)
ANION GAP SERPL CALCULATED.3IONS-SCNC: 8.4 MMOL/L (ref 5–15)
AST SERPL-CCNC: 23 U/L (ref 1–32)
BILIRUB SERPL-MCNC: 0.7 MG/DL (ref 0–1.2)
BUN SERPL-MCNC: 19 MG/DL (ref 8–23)
BUN/CREAT SERPL: 21.1 (ref 7–25)
CALCIUM SPEC-SCNC: 9.7 MG/DL (ref 8.6–10.5)
CHLORIDE SERPL-SCNC: 106 MMOL/L (ref 98–107)
CO2 SERPL-SCNC: 26.6 MMOL/L (ref 22–29)
CREAT SERPL-MCNC: 0.9 MG/DL (ref 0.57–1)
GFR SERPL CREATININE-BSD FRML MDRD: 61 ML/MIN/1.73
GLOBULIN UR ELPH-MCNC: 2.5 GM/DL
GLUCOSE SERPL-MCNC: 128 MG/DL (ref 65–99)
POTASSIUM SERPL-SCNC: 4.2 MMOL/L (ref 3.5–5.2)
PROT SERPL-MCNC: 6.5 G/DL (ref 6–8.5)
SODIUM SERPL-SCNC: 141 MMOL/L (ref 136–145)
T4 FREE SERPL-MCNC: 0.91 NG/DL (ref 0.93–1.7)
TSH SERPL DL<=0.05 MIU/L-ACNC: 6.62 UIU/ML (ref 0.27–4.2)

## 2021-03-23 PROCEDURE — 84439 ASSAY OF FREE THYROXINE: CPT

## 2021-03-23 PROCEDURE — 84443 ASSAY THYROID STIM HORMONE: CPT

## 2021-03-23 PROCEDURE — 99214 OFFICE O/P EST MOD 30 MIN: CPT | Performed by: FAMILY MEDICINE

## 2021-03-23 PROCEDURE — 80053 COMPREHEN METABOLIC PANEL: CPT | Performed by: FAMILY MEDICINE

## 2021-03-23 PROCEDURE — 36415 COLL VENOUS BLD VENIPUNCTURE: CPT | Performed by: FAMILY MEDICINE

## 2021-03-23 RX ORDER — HYDROCHLOROTHIAZIDE 12.5 MG/1
12.5 TABLET ORAL DAILY
Qty: 30 TABLET | Refills: 1 | Status: SHIPPED | OUTPATIENT
Start: 2021-03-23 | End: 2021-05-19

## 2021-03-23 NOTE — PROGRESS NOTES
"Chief Complaint  bilateral leg swelling    Subjective          Karly Mcgrath presents to [facility]  History of Present Illness  Patient follows up for chronic problems of hypertension hypothyroidism with dose adjustments recent for hypothyroidism to take 75 mcg on odd days and 50 mcg on even days she has developed some swelling around the feet not extending proximal very much she is taking losartan and metoprolol for blood pressure control and heart rate control which has some slightly elevations  No chest pain or shortness of breath  Objective   Vital Signs:   /72 (BP Location: Left arm, Patient Position: Sitting, Cuff Size: Adult)   Pulse 79   Ht 165.1 cm (65\")   Wt 68.4 kg (150 lb 11.2 oz)   SpO2 98%   BMI 25.08 kg/m²     Physical Exam  Vitals reviewed.   Constitutional:       Appearance: Normal appearance.   Cardiovascular:      Rate and Rhythm: Normal rate and regular rhythm.      Pulses: Normal pulses.      Heart sounds: Normal heart sounds.   Pulmonary:      Effort: Pulmonary effort is normal.      Breath sounds: Normal breath sounds.   Musculoskeletal:      Right lower leg: Edema present.      Left lower leg: Edema present.      Comments: 1+ foot bilateral   Neurological:      General: No focal deficit present.      Mental Status: She is alert.   Psychiatric:         Mood and Affect: Mood normal.         Behavior: Behavior normal.         Thought Content: Thought content normal.         Judgment: Judgment normal.        Result Review :                 Assessment and Plan    Diagnoses and all orders for this visit:    1. Essential hypertension (Primary)  -     Comprehensive Metabolic Panel    2. Hypothyroidism, unspecified type    Other orders  -     hydroCHLOROthiazide (HYDRODIURIL) 12.5 MG tablet; Take 1 tablet by mouth Daily.  Dispense: 30 tablet; Refill: 1    Hypertension continue metoprolol and losartan  Hypothyroidism we will follow-up results of testing in 1 " month    Follow Up   Return in about 1 month (around 4/23/2021), or if symptoms worsen or fail to improve, for Recheck.  Patient was given instructions and counseling regarding her condition or for health maintenance advice. Please see specific information pulled into the AVS if appropriate.

## 2021-03-31 ENCOUNTER — CLINICAL SUPPORT (OUTPATIENT)
Dept: ONCOLOGY | Facility: HOSPITAL | Age: 76
End: 2021-03-31

## 2021-03-31 ENCOUNTER — LAB (OUTPATIENT)
Dept: OTHER | Facility: HOSPITAL | Age: 76
End: 2021-03-31

## 2021-03-31 VITALS
DIASTOLIC BLOOD PRESSURE: 79 MMHG | OXYGEN SATURATION: 98 % | RESPIRATION RATE: 18 BRPM | TEMPERATURE: 97.3 F | HEIGHT: 65 IN | BODY MASS INDEX: 24.72 KG/M2 | SYSTOLIC BLOOD PRESSURE: 123 MMHG | HEART RATE: 72 BPM | WEIGHT: 148.4 LBS

## 2021-03-31 DIAGNOSIS — D69.6 THROMBOCYTOPENIA (HCC): ICD-10-CM

## 2021-03-31 DIAGNOSIS — E03.9 HYPOTHYROIDISM, UNSPECIFIED TYPE: Primary | ICD-10-CM

## 2021-03-31 LAB
BASOPHILS # BLD AUTO: 0.04 10*3/MM3 (ref 0–0.2)
BASOPHILS NFR BLD AUTO: 0.6 % (ref 0–1.5)
DEPRECATED RDW RBC AUTO: 39.5 FL (ref 37–54)
EOSINOPHIL # BLD AUTO: 0.13 10*3/MM3 (ref 0–0.4)
EOSINOPHIL NFR BLD AUTO: 1.9 % (ref 0.3–6.2)
ERYTHROCYTE [DISTWIDTH] IN BLOOD BY AUTOMATED COUNT: 12.3 % (ref 12.3–15.4)
HCT VFR BLD AUTO: 39.4 % (ref 34–46.6)
HGB BLD-MCNC: 14 G/DL (ref 12–15.9)
IMM GRANULOCYTES # BLD AUTO: 0.06 10*3/MM3 (ref 0–0.05)
IMM GRANULOCYTES NFR BLD AUTO: 0.9 % (ref 0–0.5)
LYMPHOCYTES # BLD AUTO: 1.62 10*3/MM3 (ref 0.7–3.1)
LYMPHOCYTES NFR BLD AUTO: 24 % (ref 19.6–45.3)
MCH RBC QN AUTO: 31 PG (ref 26.6–33)
MCHC RBC AUTO-ENTMCNC: 35.5 G/DL (ref 31.5–35.7)
MCV RBC AUTO: 87.2 FL (ref 79–97)
MONOCYTES # BLD AUTO: 0.66 10*3/MM3 (ref 0.1–0.9)
MONOCYTES NFR BLD AUTO: 9.8 % (ref 5–12)
NEUTROPHILS NFR BLD AUTO: 4.23 10*3/MM3 (ref 1.7–7)
NEUTROPHILS NFR BLD AUTO: 62.8 % (ref 42.7–76)
NRBC BLD AUTO-RTO: 0 /100 WBC (ref 0–0.2)
PLATELET # BLD AUTO: 138 10*3/MM3 (ref 140–450)
PMV BLD AUTO: 13.1 FL (ref 6–12)
RBC # BLD AUTO: 4.52 10*6/MM3 (ref 3.77–5.28)
WBC # BLD AUTO: 6.74 10*3/MM3 (ref 3.4–10.8)

## 2021-03-31 PROCEDURE — 85025 COMPLETE CBC W/AUTO DIFF WBC: CPT | Performed by: INTERNAL MEDICINE

## 2021-03-31 PROCEDURE — 36415 COLL VENOUS BLD VENIPUNCTURE: CPT

## 2021-03-31 PROCEDURE — G0463 HOSPITAL OUTPT CLINIC VISIT: HCPCS

## 2021-03-31 NOTE — NURSING NOTE
Here for CBC and review. Labs are stable for her at this time; pkatelets have increased to 138K from 126K.   Lab Results   Component Value Date    WBC 6.74 03/31/2021    HGB 14.0 03/31/2021    HCT 39.4 03/31/2021    MCV 87.2 03/31/2021     (L) 03/31/2021     She has no concerns; copy of results given and she states that she is taking B12 and ferrous sulfate as prescribed. F/U appt was reviewed for 04- and she was reminded to call for questions/ concerns. U/V.

## 2021-04-14 ENCOUNTER — CLINICAL SUPPORT (OUTPATIENT)
Dept: ONCOLOGY | Facility: HOSPITAL | Age: 76
End: 2021-04-14

## 2021-04-14 ENCOUNTER — TELEPHONE (OUTPATIENT)
Dept: ONCOLOGY | Facility: CLINIC | Age: 76
End: 2021-04-14

## 2021-04-14 ENCOUNTER — LAB (OUTPATIENT)
Dept: OTHER | Facility: HOSPITAL | Age: 76
End: 2021-04-14

## 2021-04-14 VITALS — BODY MASS INDEX: 24.06 KG/M2 | WEIGHT: 144.6 LBS

## 2021-04-14 DIAGNOSIS — D50.9 IRON DEFICIENCY ANEMIA, UNSPECIFIED IRON DEFICIENCY ANEMIA TYPE: ICD-10-CM

## 2021-04-14 DIAGNOSIS — D69.3 IMMUNE THROMBOCYTOPENIC PURPURA (HCC): ICD-10-CM

## 2021-04-14 LAB
BASOPHILS # BLD AUTO: 0.02 10*3/MM3 (ref 0–0.2)
BASOPHILS NFR BLD AUTO: 0.3 % (ref 0–1.5)
DEPRECATED RDW RBC AUTO: 36.8 FL (ref 37–54)
EOSINOPHIL # BLD AUTO: 0.1 10*3/MM3 (ref 0–0.4)
EOSINOPHIL NFR BLD AUTO: 1.7 % (ref 0.3–6.2)
ERYTHROCYTE [DISTWIDTH] IN BLOOD BY AUTOMATED COUNT: 12 % (ref 12.3–15.4)
HCT VFR BLD AUTO: 38 % (ref 34–46.6)
HGB BLD-MCNC: 13.9 G/DL (ref 12–15.9)
IMM GRANULOCYTES # BLD AUTO: 0.03 10*3/MM3 (ref 0–0.05)
IMM GRANULOCYTES NFR BLD AUTO: 0.5 % (ref 0–0.5)
LYMPHOCYTES # BLD AUTO: 1.46 10*3/MM3 (ref 0.7–3.1)
LYMPHOCYTES NFR BLD AUTO: 25 % (ref 19.6–45.3)
MCH RBC QN AUTO: 30.9 PG (ref 26.6–33)
MCHC RBC AUTO-ENTMCNC: 36.6 G/DL (ref 31.5–35.7)
MCV RBC AUTO: 84.4 FL (ref 79–97)
MONOCYTES # BLD AUTO: 0.59 10*3/MM3 (ref 0.1–0.9)
MONOCYTES NFR BLD AUTO: 10.1 % (ref 5–12)
NEUTROPHILS NFR BLD AUTO: 3.63 10*3/MM3 (ref 1.7–7)
NEUTROPHILS NFR BLD AUTO: 62.4 % (ref 42.7–76)
NRBC BLD AUTO-RTO: 0 /100 WBC (ref 0–0.2)
PLATELET # BLD AUTO: 116 10*3/MM3 (ref 140–450)
PMV BLD AUTO: 12.8 FL (ref 6–12)
RBC # BLD AUTO: 4.5 10*6/MM3 (ref 3.77–5.28)
WBC # BLD AUTO: 5.83 10*3/MM3 (ref 3.4–10.8)

## 2021-04-14 PROCEDURE — 85025 COMPLETE CBC W/AUTO DIFF WBC: CPT | Performed by: INTERNAL MEDICINE

## 2021-04-14 PROCEDURE — 36415 COLL VENOUS BLD VENIPUNCTURE: CPT

## 2021-04-14 PROCEDURE — G0463 HOSPITAL OUTPT CLINIC VISIT: HCPCS

## 2021-04-14 NOTE — TELEPHONE ENCOUNTER
Caller: DARRIN SINGH    Relationship to patient: SELF    Best call back number: [phone]    Chief complaint: PT STATES SHE WAS TOLD TO SCHEDULE A FOLLOW UP APPT IN TWO WEEKS    Type of visit: FOLLOW UP    Requested date: 04/28/21

## 2021-04-14 NOTE — NURSING NOTE
Lab Results   Component Value Date    WBC 5.83 04/14/2021    HGB 13.9 04/14/2021    HCT 38.0 04/14/2021    MCV 84.4 04/14/2021     (L) 04/14/2021     Patient here today for CBC Review. Patient with no complaints at this time. Labs overall stable. Reviewed with Dr. Muro, at this time MD states to continue to monitor plt count. Reviewed return appointments with patient. Patient instructed to contact office for any questions or concerns, understanding verbalized.

## 2021-04-19 ENCOUNTER — TELEPHONE (OUTPATIENT)
Dept: INTERNAL MEDICINE | Facility: CLINIC | Age: 76
End: 2021-04-19

## 2021-04-19 NOTE — TELEPHONE ENCOUNTER
THIS MESSAGE IS FOR:   DR. FRIEND         PATIENT CALLED STATING:     PATIENT IS HAVING A DIFFICULT TIME WITH HER BOWEL MOVEMENTS. FOR 2 DAYS NOW    SHE HAS TAKEN MIRALAX AND THEN LATER A FLEET ENEMA     PATIENT IS REQUESTING:     CALL BACK WITH SUGGESTIONS OR ADVICE       PATIENT NEEDS THIS BY:  ASAP     PHARMACY TO USE:   [facility] [address] [address] [facility] AT [facility] & [address] - [phone]  - [phone]   [phone]    PLEASE ADVISE/CALL PATIENT IF YOU HAVE ANY QUESTIONS- PHONE NUMBER:   Karly Mcgrath (Self) [phone] ()

## 2021-04-22 ENCOUNTER — TELEPHONE (OUTPATIENT)
Dept: INTERNAL MEDICINE | Facility: CLINIC | Age: 76
End: 2021-04-22

## 2021-04-22 RX ORDER — BISACODYL 10 MG
10 SUPPOSITORY, RECTAL RECTAL DAILY
Qty: 10 SUPPOSITORY | Refills: 0 | Status: SHIPPED | OUTPATIENT
Start: 2021-04-22 | End: 2021-04-29

## 2021-04-22 NOTE — TELEPHONE ENCOUNTER
Caller: Karly Mcgrath    Relationship to patient: Self    Best call back number: [phone]    Patient is needing: SUPPOSITORY FOR PERSISTENT CONSTIPATION. PATIENT STATED THAT SHE HAD ONLY GONE ONCE IN 8 DAYS AND FEELS CONSTIPATED. PLEASE CALL PATIENT AND ADVISE. PHARMACY VERIFIED    [address] [address] [facility] AT [facility] & [address] - [phone]  - [phone]   [phone]

## 2021-04-22 NOTE — TELEPHONE ENCOUNTER
Patient notified and expressed understanding.  Patient wanted to know if she should continue to take the Colace with this.  Please advise   She said that she feels like she needs to go but is having a hard time getting it to come out.

## 2021-04-23 ENCOUNTER — APPOINTMENT (OUTPATIENT)
Dept: CT IMAGING | Facility: HOSPITAL | Age: 76
End: 2021-04-23

## 2021-04-23 ENCOUNTER — HOSPITAL ENCOUNTER (EMERGENCY)
Facility: HOSPITAL | Age: 76
Discharge: HOME | End: 2021-04-23
Attending: EMERGENCY MEDICINE | Admitting: EMERGENCY MEDICINE

## 2021-04-23 VITALS
HEIGHT: 65 IN | DIASTOLIC BLOOD PRESSURE: 85 MMHG | SYSTOLIC BLOOD PRESSURE: 157 MMHG | RESPIRATION RATE: 17 BRPM | BODY MASS INDEX: 23.32 KG/M2 | HEART RATE: 91 BPM | WEIGHT: 140 LBS | TEMPERATURE: 97.5 F | OXYGEN SATURATION: 98 %

## 2021-04-23 DIAGNOSIS — K59.00 CONSTIPATION, UNSPECIFIED CONSTIPATION TYPE: Primary | ICD-10-CM

## 2021-04-23 LAB
ALBUMIN SERPL-MCNC: 4.1 G/DL (ref 3.5–5.2)
ALBUMIN/GLOB SERPL: 2.1 G/DL
ALP SERPL-CCNC: 70 U/L (ref 39–117)
ALT SERPL W P-5'-P-CCNC: 20 U/L (ref 1–33)
ANION GAP SERPL CALCULATED.3IONS-SCNC: 6.3 MMOL/L (ref 5–15)
AST SERPL-CCNC: 26 U/L (ref 1–32)
BASOPHILS # BLD AUTO: 0.02 10*3/MM3 (ref 0–0.2)
BASOPHILS NFR BLD AUTO: 0.4 % (ref 0–1.5)
BILIRUB SERPL-MCNC: 0.7 MG/DL (ref 0–1.2)
BILIRUB UR QL STRIP: NEGATIVE
BUN SERPL-MCNC: 8 MG/DL (ref 8–23)
BUN/CREAT SERPL: 9.3 (ref 7–25)
CALCIUM SPEC-SCNC: 9.6 MG/DL (ref 8.6–10.5)
CHLORIDE SERPL-SCNC: 97 MMOL/L (ref 98–107)
CLARITY UR: CLEAR
CO2 SERPL-SCNC: 27.7 MMOL/L (ref 22–29)
COLOR UR: YELLOW
CREAT SERPL-MCNC: 0.86 MG/DL (ref 0.57–1)
DEPRECATED RDW RBC AUTO: 40.4 FL (ref 37–54)
EOSINOPHIL # BLD AUTO: 0.04 10*3/MM3 (ref 0–0.4)
EOSINOPHIL NFR BLD AUTO: 0.8 % (ref 0.3–6.2)
ERYTHROCYTE [DISTWIDTH] IN BLOOD BY AUTOMATED COUNT: 12.5 % (ref 12.3–15.4)
GFR SERPL CREATININE-BSD FRML MDRD: 64 ML/MIN/1.73
GLOBULIN UR ELPH-MCNC: 2 GM/DL
GLUCOSE SERPL-MCNC: 93 MG/DL (ref 65–99)
GLUCOSE UR STRIP-MCNC: NEGATIVE MG/DL
HCT VFR BLD AUTO: 35.1 % (ref 34–46.6)
HGB BLD-MCNC: 12.2 G/DL (ref 12–15.9)
HGB UR QL STRIP.AUTO: NEGATIVE
IMM GRANULOCYTES # BLD AUTO: 0.01 10*3/MM3 (ref 0–0.05)
IMM GRANULOCYTES NFR BLD AUTO: 0.2 % (ref 0–0.5)
KETONES UR QL STRIP: NEGATIVE
LEUKOCYTE ESTERASE UR QL STRIP.AUTO: NEGATIVE
LIPASE SERPL-CCNC: 19 U/L (ref 13–60)
LYMPHOCYTES # BLD AUTO: 1.04 10*3/MM3 (ref 0.7–3.1)
LYMPHOCYTES NFR BLD AUTO: 21.8 % (ref 19.6–45.3)
MCH RBC QN AUTO: 30.7 PG (ref 26.6–33)
MCHC RBC AUTO-ENTMCNC: 34.8 G/DL (ref 31.5–35.7)
MCV RBC AUTO: 88.4 FL (ref 79–97)
MONOCYTES # BLD AUTO: 0.53 10*3/MM3 (ref 0.1–0.9)
MONOCYTES NFR BLD AUTO: 11.1 % (ref 5–12)
NEUTROPHILS NFR BLD AUTO: 3.12 10*3/MM3 (ref 1.7–7)
NEUTROPHILS NFR BLD AUTO: 65.7 % (ref 42.7–76)
NITRITE UR QL STRIP: NEGATIVE
NRBC BLD AUTO-RTO: 0 /100 WBC (ref 0–0.2)
PH UR STRIP.AUTO: 7.5 [PH] (ref 5–8)
PLATELET # BLD AUTO: 140 10*3/MM3 (ref 140–450)
PMV BLD AUTO: 11.7 FL (ref 6–12)
POTASSIUM SERPL-SCNC: 4 MMOL/L (ref 3.5–5.2)
PROT SERPL-MCNC: 6.1 G/DL (ref 6–8.5)
PROT UR QL STRIP: NEGATIVE
RBC # BLD AUTO: 3.97 10*6/MM3 (ref 3.77–5.28)
SODIUM SERPL-SCNC: 131 MMOL/L (ref 136–145)
SP GR UR STRIP: 1.01 (ref 1–1.03)
UROBILINOGEN UR QL STRIP: NORMAL
WBC # BLD AUTO: 4.76 10*3/MM3 (ref 3.4–10.8)

## 2021-04-23 PROCEDURE — 80053 COMPREHEN METABOLIC PANEL: CPT | Performed by: PHYSICIAN ASSISTANT

## 2021-04-23 PROCEDURE — 99284 EMERGENCY DEPT VISIT MOD MDM: CPT

## 2021-04-23 PROCEDURE — 85025 COMPLETE CBC W/AUTO DIFF WBC: CPT | Performed by: PHYSICIAN ASSISTANT

## 2021-04-23 PROCEDURE — 83690 ASSAY OF LIPASE: CPT | Performed by: PHYSICIAN ASSISTANT

## 2021-04-23 PROCEDURE — 25010000002 IOPAMIDOL 61 % SOLUTION: Performed by: EMERGENCY MEDICINE

## 2021-04-23 PROCEDURE — 81003 URINALYSIS AUTO W/O SCOPE: CPT | Performed by: PHYSICIAN ASSISTANT

## 2021-04-23 PROCEDURE — 25010000002 HYDRALAZINE PER 20 MG: Performed by: EMERGENCY MEDICINE

## 2021-04-23 PROCEDURE — 74177 CT ABD & PELVIS W/CONTRAST: CPT

## 2021-04-23 PROCEDURE — 96374 THER/PROPH/DIAG INJ IV PUSH: CPT

## 2021-04-23 RX ORDER — HYDRALAZINE HYDROCHLORIDE 20 MG/ML
10 INJECTION INTRAMUSCULAR; INTRAVENOUS ONCE
Status: COMPLETED | OUTPATIENT
Start: 2021-04-23 | End: 2021-04-23

## 2021-04-23 RX ORDER — POLYETHYLENE GLYCOL 3350 17 G/17G
17 POWDER, FOR SOLUTION ORAL 2 TIMES DAILY
Qty: 170 G | Refills: 0 | Status: SHIPPED | OUTPATIENT
Start: 2021-04-23 | End: 2021-04-28

## 2021-04-23 RX ADMIN — HYDRALAZINE HYDROCHLORIDE 10 MG: 20 INJECTION, SOLUTION INTRAMUSCULAR; INTRAVENOUS at 15:25

## 2021-04-23 RX ADMIN — IOPAMIDOL 85 ML: 612 INJECTION, SOLUTION INTRAVENOUS at 13:48

## 2021-04-23 NOTE — ED TRIAGE NOTES
States constipation X 7-8 days. Denies other SX      Mask placed on patient in triage. Triage staff wore appropriate PPE during interaction with patient.

## 2021-04-23 NOTE — ED PROVIDER NOTES
EMERGENCY DEPARTMENT ENCOUNTER    Room Number:  42/42  Date seen:  4/23/2021  Time seen: 12:27 EDT  PCP: Vito Adame MD  Historian: Patient    HPI:  Chief complaint: Constipation  A complete HPI/ROS/PMH/PSH/SH/FH are unobtainable due to: None  Context:Karly Mcgrath is a 75 y.o. female, who presents to the ED with c/o last normal bowel movement on 4/13/2021.  She has had a very small bowel movement about 4 days ago.  Associated symptoms include mild intermittent suprapubic discomfort and nausea.  She reports that her primary care doctor has prescribed her Dulcolax, enemas, stool softeners with no relief.  Patient denies any dysuria or hematuria.  Reports that she has history of appendectomy.    Patient was placed in face mask in first look. Patient was wearing facemask when I entered the room and throughout our encounter. I wore full protective equipment throughout this patient encounter including a face mask, goggles, and gloves. Hand hygiene was performed before donning protective equipment and after removal when leaving the room.      MEDICAL RECORD REVIEW      ALLERGIES  Eggs or egg-derived products and Sulfa antibiotics    PAST MEDICAL HISTORY  Active Ambulatory Problems     Diagnosis Date Noted   • Anxiety 04/17/2016   • Bronchiectasis without complication (CMS/[facility]) 04/17/2016   • Hyperlipidemia 04/17/2016   • Hypertension 04/17/2016   • Osteopenia 04/17/2016   • Hypothyroidism 04/17/2016   • Health care maintenance 04/17/2016   • Herpes simplex type 1 infection 02/23/2018   • Urethritis 07/16/2018   • Hypovitaminosis D 11/19/2018   • Medicare annual wellness visit, initial 06/03/2019   • Menopausal and postmenopausal disorder 06/03/2019   • Vegetarian 12/11/2019   • Thrombocytopenia (CMS/[facility]) 03/09/2020   • Medicare annual wellness visit, subsequent 09/10/2020   • Anemia due to vitamin B12 deficiency 09/10/2020   • Immune thrombocytopenic purpura (CMS/[facility]) 10/14/2020     Resolved Ambulatory Problems      Diagnosis Date Noted   • Acute non-recurrent maxillary sinusitis 02/22/2019   • Pneumonia 09/09/2019     Past Medical History:   Diagnosis Date   • Basal cell carcinoma    • Disease of thyroid gland    • Maxillary sinusitis 10/08/2015       PAST SURGICAL HISTORY  Past Surgical History:   Procedure Laterality Date   • APPENDECTOMY     • BRONCHOSCOPY      6-7 YEARS AGO   • BRONCHOSCOPY N/A 9/18/2018    Procedure: BRONCHOSCOPY WITH BAL;  Surgeon: Josué Espinosa MD;  Location: [facility] ENDOSCOPY;  Service: Pulmonary   • COLONOSCOPY     • TUBAL ABDOMINAL LIGATION     • WRIST SURGERY         FAMILY HISTORY  Family History   Problem Relation Age of Onset   • Heart attack Mother         Acute Myocardial Infarction   • Heart failure Mother         Congestive Heart Failure   • Hypertension Mother    • Coronary artery disease Brother         CABG   • Heart attack Father    • Breast cancer Daughter        SOCIAL HISTORY  Social History     Socioeconomic History   • Marital status:      Spouse name: Don   • Number of children: Not on file   • Years of education: Not on file   • Highest education level: Not on file   Tobacco Use   • Smoking status: Never Smoker   • Smokeless tobacco: Never Used   Substance and Sexual Activity   • Alcohol use: No   • Drug use: No   • Sexual activity: Defer       REVIEW OF SYSTEMS  Review of Systems    All systems reviewed and negative except for those discussed in HPI.     PHYSICAL EXAM    ED Triage Vitals   Temp Heart Rate Resp BP SpO2   04/23/21 1031 04/23/21 1031 04/23/21 1031 04/23/21 1145 04/23/21 1031   97.5 °F (36.4 °C) 76 16 (!) 206/93 96 %      Temp src Heart Rate Source Patient Position BP Location FiO2 (%)   04/23/21 1031 04/23/21 1145 04/23/21 1145 04/23/21 1145 --   Tympanic Monitor Lying Left arm      Physical Exam    I have reviewed the triage vital signs and nursing notes.      GENERAL: not distressed  HENT: nares patent  EYES: no scleral icterus  NECK: no ROM  limitations  CV: regular rhythm, regular rate  RESPIRATORY: normal effort  ABDOMEN: soft; minimal tenderness palpation to the suprapubic region, no rebound or guarding  : deferred  MUSCULOSKELETAL: no deformity  NEURO: alert, moves all extremities, follows commands  SKIN: warm, dry    LAB RESULTS  Recent Results (from the past 24 hour(s))   Comprehensive Metabolic Panel    Collection Time: 04/23/21 12:47 PM    Specimen: Blood   Result Value Ref Range    Glucose 93 65 - 99 mg/dL    BUN 8 8 - 23 mg/dL    Creatinine 0.86 0.57 - 1.00 mg/dL    Sodium 131 (L) 136 - 145 mmol/L    Potassium 4.0 3.5 - 5.2 mmol/L    Chloride 97 (L) 98 - 107 mmol/L    CO2 27.7 22.0 - 29.0 mmol/L    Calcium 9.6 8.6 - 10.5 mg/dL    Total Protein 6.1 6.0 - 8.5 g/dL    Albumin 4.10 3.50 - 5.20 g/dL    ALT (SGPT) 20 1 - 33 U/L    AST (SGOT) 26 1 - 32 U/L    Alkaline Phosphatase 70 39 - 117 U/L    Total Bilirubin 0.7 0.0 - 1.2 mg/dL    eGFR Non African Amer 64 >60 mL/min/1.73    Globulin 2.0 gm/dL    A/G Ratio 2.1 g/dL    BUN/Creatinine Ratio 9.3 7.0 - 25.0    Anion Gap 6.3 5.0 - 15.0 mmol/L   Lipase    Collection Time: 04/23/21 12:47 PM    Specimen: Blood   Result Value Ref Range    Lipase 19 13 - 60 U/L   CBC Auto Differential    Collection Time: 04/23/21 12:47 PM    Specimen: Blood   Result Value Ref Range    WBC 4.76 3.40 - 10.80 10*3/mm3    RBC 3.97 3.77 - 5.28 10*6/mm3    Hemoglobin 12.2 12.0 - 15.9 g/dL    Hematocrit 35.1 34.0 - 46.6 %    MCV 88.4 79.0 - 97.0 fL    MCH 30.7 26.6 - 33.0 pg    MCHC 34.8 31.5 - 35.7 g/dL    RDW 12.5 12.3 - 15.4 %    RDW-SD 40.4 37.0 - 54.0 fl    MPV 11.7 6.0 - 12.0 fL    Platelets 140 140 - 450 10*3/mm3    Neutrophil % 65.7 42.7 - 76.0 %    Lymphocyte % 21.8 19.6 - 45.3 %    Monocyte % 11.1 5.0 - 12.0 %    Eosinophil % 0.8 0.3 - 6.2 %    Basophil % 0.4 0.0 - 1.5 %    Immature Grans % 0.2 0.0 - 0.5 %    Neutrophils, Absolute 3.12 1.70 - 7.00 10*3/mm3    Lymphocytes, Absolute 1.04 0.70 - 3.10 10*3/mm3     Monocytes, Absolute 0.53 0.10 - 0.90 10*3/mm3    Eosinophils, Absolute 0.04 0.00 - 0.40 10*3/mm3    Basophils, Absolute 0.02 0.00 - 0.20 10*3/mm3    Immature Grans, Absolute 0.01 0.00 - 0.05 10*3/mm3    nRBC 0.0 0.0 - 0.2 /100 WBC   Urinalysis With Microscopic If Indicated (No Culture) - Urine, Clean Catch    Collection Time: 04/23/21 12:48 PM    Specimen: Urine, Clean Catch   Result Value Ref Range    Color, UA Yellow Yellow, Straw    Appearance, UA Clear Clear    pH, UA 7.5 5.0 - 8.0    Specific Gravity, UA 1.008 1.005 - 1.030    Glucose, UA Negative Negative    Ketones, UA Negative Negative    Bilirubin, UA Negative Negative    Blood, UA Negative Negative    Protein, UA Negative Negative    Leuk Esterase, UA Negative Negative    Nitrite, UA Negative Negative    Urobilinogen, UA 1.0 E.U./dL 0.2 - 1.0 E.U./dL         RADIOLOGY RESULTS  CT Abdomen Pelvis With Contrast   Final Result   The sigmoid colon and rectum are devoid of formed stool. No   acute bowel abnormality is seen. Please correlate clinically for   possible gastritis.       Discussed with SHERIN Millan.       This report was finalized on 4/23/2021 4:46 PM by Dr. Laurita Krishnamurthy M.D.                PROGRESS, DATA ANALYSIS, CONSULTS AND MEDICAL DECISION MAKING  All labs have been independently reviewed by me.  All radiology studies have been reviewed by me and discussed with radiologist dictating the report. Discussion below represents my analysis of pertinent findings related to patient's condition, differential diagnosis, treatment plan and final disposition.     ED Course as of Apr 23 1923 Fri Apr 23, 2021   1708 Patient blood pressure improved after 10 mg of hydralazine.  After the enema, there was small amount of a bowel movement although not completely successful.  She reports that she wants to go home and will try one at home.  Patient did not show any bowel obstructions.  Believe she is safe to be discharged home and I will prescribe her  "MiraLAX.  She agrees with the plan of care and all question addressed at this time.    [SS]      ED Course User Index  [SS] Elizabeth Hancock PA-C       The differential diagnosis include but are not limited to small bowel obstruction, colitis, malignancy.     Reviewed pt's history and workup with Dr. Chaparro.  After a bedside evaluation, Dr. Chaparro agrees with the plan of care.    (FOR DISCHARGE)The patient's history, physical exam, and lab findings were discussed with the physician, who also performed a face to face history and physical exam.  I discussed all results and noted any abnormalities with patient.  Discussed absoute need to recheck abnormalities with their family physician.  I answered any of the patient's questions.  Discussed plan for discharge, as there is no emergent indication for admission.  Pt is agreeable and understands need for follow up and repeat testing.  Pt is aware that discharge does not mean that nothing is wrong but it indicates no emergency is present and they must continue care with their family physician.  Pt is discharged with instructions to follow up with primary care doctor to have their blood pressure rechecked.             Disposition vitals:  /85 (BP Location: Left arm, Patient Position: Sitting)   Pulse 91   Temp 97.5 °F (36.4 °C) (Tympanic)   Resp 17   Ht 165.1 cm (65\")   Wt 63.5 kg (140 lb)   SpO2 98%   BMI 23.30 kg/m²       DIAGNOSIS  Final diagnoses:   Constipation, unspecified constipation type       FOLLOW UP   Vito Adame MD  [address] [facility] [address]  [facility] [address] [phone]          [facility] Emergency Department  [address] [facility] [address] [phone]    As needed, If symptoms worsen         Elizabeth Hancock PA-C  04/23/21 1923    "

## 2021-04-23 NOTE — ED NOTES
Pt arrives from home with c/o constipation x10 days. Has been seen by PCP and given multiple medications, including stool softeners, laxatives, and a fleet  enema. Last BM was this past Wednesday. States she is passing gas. Denies any abd pain, but does have intermittent cramping. Pt a&ox4, abc's intact,NAD noted, ambulatory in room.    Pt noted to have mask on when this RN entered the room. This RN wore appropriate PPE throughout our encounter. Hand hygiene performed upon entering and exiting room.       Alyssa Lozano, RN  04/23/21 3163

## 2021-04-23 NOTE — DISCHARGE INSTRUCTIONS
Please drink about 8 glass of water a day.  Take the prescription as prescribed and/or use over-the-counter enema.  Follow-up with your primary care doctor in 2 to 3 days if the symptoms continue.

## 2021-04-23 NOTE — TELEPHONE ENCOUNTER
Patient notified and expressed understanding.  She said that she is going to go to the hospital because she can't take it anymore and has not had good luck with anything that she has tried.

## 2021-04-23 NOTE — ED PROVIDER NOTES
Pt presents to the ED c/o  patient with constipation. Patient states she has had difficulty having bowel movements for the past 10 to 14 days. She is using Dulcolax, one fleets enema and MiraLAX with minimal relief. She states she has not eaten today at a concerned that she is becoming more constipated. However, she denies nausea, vomiting, fever, chills or abdominal distention. She denies urinary symptoms.     On exam,   Her heart is regular rate and rhythm without murmur. Her lungs are clear to auscultation bilaterally. Her abdomen is normoactive bowel sounds, soft, nontender nondistended.     Plan: I agree with plan of checking basic blood work and a CAT scan to rule out a bowel obstruction.    Patient was placed in face mask in first look. Patient was wearing facemask when I entered the room and throughout our encounter. I wore full protective equipment throughout this patient encounter including a face mask, eye shield and gloves. Hand hygiene was performed before donning protective equipment and after removal when leaving the room.       Attestation:  The KHANH and I have discussed this patient's history, physical exam, and treatment plan.  I have reviewed the documentation and personally had a face to face interaction with the patient. I affirm the documentation and agree with the treatment and plan.  The attached note describes my personal findings.            Liban Chaparro MD  04/23/21 6913

## 2021-04-28 ENCOUNTER — LAB (OUTPATIENT)
Dept: OTHER | Facility: HOSPITAL | Age: 76
End: 2021-04-28

## 2021-04-28 ENCOUNTER — CLINICAL SUPPORT (OUTPATIENT)
Dept: ONCOLOGY | Facility: HOSPITAL | Age: 76
End: 2021-04-28

## 2021-04-28 VITALS
TEMPERATURE: 97.5 F | BODY MASS INDEX: 23.76 KG/M2 | HEART RATE: 82 BPM | SYSTOLIC BLOOD PRESSURE: 160 MMHG | WEIGHT: 142.6 LBS | OXYGEN SATURATION: 97 % | DIASTOLIC BLOOD PRESSURE: 82 MMHG | HEIGHT: 65 IN | RESPIRATION RATE: 18 BRPM

## 2021-04-28 DIAGNOSIS — D50.9 IRON DEFICIENCY ANEMIA, UNSPECIFIED IRON DEFICIENCY ANEMIA TYPE: ICD-10-CM

## 2021-04-28 DIAGNOSIS — D69.3 IMMUNE THROMBOCYTOPENIC PURPURA (HCC): ICD-10-CM

## 2021-04-28 LAB
BASOPHILS # BLD AUTO: 0.02 10*3/MM3 (ref 0–0.2)
BASOPHILS NFR BLD AUTO: 0.3 % (ref 0–1.5)
DEPRECATED RDW RBC AUTO: 38.2 FL (ref 37–54)
EOSINOPHIL # BLD AUTO: 0.06 10*3/MM3 (ref 0–0.4)
EOSINOPHIL NFR BLD AUTO: 0.9 % (ref 0.3–6.2)
ERYTHROCYTE [DISTWIDTH] IN BLOOD BY AUTOMATED COUNT: 12.5 % (ref 12.3–15.4)
HCT VFR BLD AUTO: 36.7 % (ref 34–46.6)
HGB BLD-MCNC: 13.3 G/DL (ref 12–15.9)
IMM GRANULOCYTES # BLD AUTO: 0.06 10*3/MM3 (ref 0–0.05)
IMM GRANULOCYTES NFR BLD AUTO: 0.9 % (ref 0–0.5)
LYMPHOCYTES # BLD AUTO: 1.27 10*3/MM3 (ref 0.7–3.1)
LYMPHOCYTES NFR BLD AUTO: 19.6 % (ref 19.6–45.3)
MCH RBC QN AUTO: 30.8 PG (ref 26.6–33)
MCHC RBC AUTO-ENTMCNC: 36.2 G/DL (ref 31.5–35.7)
MCV RBC AUTO: 85 FL (ref 79–97)
MONOCYTES # BLD AUTO: 0.59 10*3/MM3 (ref 0.1–0.9)
MONOCYTES NFR BLD AUTO: 9.1 % (ref 5–12)
NEUTROPHILS NFR BLD AUTO: 4.47 10*3/MM3 (ref 1.7–7)
NEUTROPHILS NFR BLD AUTO: 69.2 % (ref 42.7–76)
NRBC BLD AUTO-RTO: 0 /100 WBC (ref 0–0.2)
PLATELET # BLD AUTO: 155 10*3/MM3 (ref 140–450)
PMV BLD AUTO: 12 FL (ref 6–12)
RBC # BLD AUTO: 4.32 10*6/MM3 (ref 3.77–5.28)
WBC # BLD AUTO: 6.47 10*3/MM3 (ref 3.4–10.8)

## 2021-04-28 PROCEDURE — 36415 COLL VENOUS BLD VENIPUNCTURE: CPT

## 2021-04-28 PROCEDURE — G0463 HOSPITAL OUTPT CLINIC VISIT: HCPCS

## 2021-04-28 PROCEDURE — 85025 COMPLETE CBC W/AUTO DIFF WBC: CPT | Performed by: INTERNAL MEDICINE

## 2021-04-28 NOTE — NURSING NOTE
Patient arrived ambulatory for lab and RN review. Patient stated she stopped the ferrous sulfate once weekly due to intolerance. Patient did successfully wean herself off the prednisone by 3/3/2021. Labs discussed with patient and copy given as well as copy of next appointments. Patient v/u if any concerns before next appointment she will contact physician.  Lab Results   Component Value Date    WBC 6.47 04/28/2021    HGB 13.3 04/28/2021    HCT 36.7 04/28/2021    MCV 85.0 04/28/2021     04/28/2021     Lab Results   Component Value Date    NEUTROABS 4.47 04/28/2021

## 2021-04-29 ENCOUNTER — OFFICE VISIT (OUTPATIENT)
Dept: INTERNAL MEDICINE | Facility: CLINIC | Age: 76
End: 2021-04-29

## 2021-04-29 ENCOUNTER — LAB (OUTPATIENT)
Dept: LAB | Facility: HOSPITAL | Age: 76
End: 2021-04-29

## 2021-04-29 VITALS
BODY MASS INDEX: 23.81 KG/M2 | HEART RATE: 88 BPM | DIASTOLIC BLOOD PRESSURE: 70 MMHG | SYSTOLIC BLOOD PRESSURE: 136 MMHG | WEIGHT: 142.9 LBS | OXYGEN SATURATION: 98 % | HEIGHT: 65 IN

## 2021-04-29 DIAGNOSIS — I10 ESSENTIAL HYPERTENSION: ICD-10-CM

## 2021-04-29 DIAGNOSIS — K59.09 OTHER CONSTIPATION: ICD-10-CM

## 2021-04-29 DIAGNOSIS — E03.9 HYPOTHYROIDISM, UNSPECIFIED TYPE: ICD-10-CM

## 2021-04-29 DIAGNOSIS — E78.2 MIXED HYPERLIPIDEMIA: Primary | ICD-10-CM

## 2021-04-29 DIAGNOSIS — D75.89 MACROCYTOSIS WITHOUT ANEMIA: ICD-10-CM

## 2021-04-29 PROBLEM — K59.00 OBSTIPATION: Status: ACTIVE | Noted: 2021-04-29

## 2021-04-29 PROBLEM — K59.00 OBSTIPATION: Status: RESOLVED | Noted: 2021-04-29 | Resolved: 2021-04-29

## 2021-04-29 LAB
ALBUMIN SERPL-MCNC: 4.3 G/DL (ref 3.5–5.2)
ALBUMIN/GLOB SERPL: 2 G/DL
ALP SERPL-CCNC: 74 U/L (ref 39–117)
ALT SERPL W P-5'-P-CCNC: 15 U/L (ref 1–33)
ANION GAP SERPL CALCULATED.3IONS-SCNC: 9.6 MMOL/L (ref 5–15)
AST SERPL-CCNC: 22 U/L (ref 1–32)
BILIRUB SERPL-MCNC: 0.6 MG/DL (ref 0–1.2)
BUN SERPL-MCNC: 19 MG/DL (ref 8–23)
BUN/CREAT SERPL: 17.8 (ref 7–25)
CALCIUM SPEC-SCNC: 9.8 MG/DL (ref 8.6–10.5)
CHLORIDE SERPL-SCNC: 94 MMOL/L (ref 98–107)
CO2 SERPL-SCNC: 26.4 MMOL/L (ref 22–29)
CREAT SERPL-MCNC: 1.07 MG/DL (ref 0.57–1)
GFR SERPL CREATININE-BSD FRML MDRD: 50 ML/MIN/1.73
GLOBULIN UR ELPH-MCNC: 2.2 GM/DL
GLUCOSE SERPL-MCNC: 99 MG/DL (ref 65–99)
POTASSIUM SERPL-SCNC: 4.2 MMOL/L (ref 3.5–5.2)
PROT SERPL-MCNC: 6.5 G/DL (ref 6–8.5)
SODIUM SERPL-SCNC: 130 MMOL/L (ref 136–145)
T4 FREE SERPL-MCNC: 1.51 NG/DL (ref 0.93–1.7)
TSH SERPL DL<=0.05 MIU/L-ACNC: 0.68 UIU/ML (ref 0.27–4.2)

## 2021-04-29 PROCEDURE — 36415 COLL VENOUS BLD VENIPUNCTURE: CPT | Performed by: FAMILY MEDICINE

## 2021-04-29 PROCEDURE — 80053 COMPREHEN METABOLIC PANEL: CPT | Performed by: FAMILY MEDICINE

## 2021-04-29 PROCEDURE — 99214 OFFICE O/P EST MOD 30 MIN: CPT | Performed by: FAMILY MEDICINE

## 2021-04-29 PROCEDURE — 83921 ORGANIC ACID SINGLE QUANT: CPT | Performed by: FAMILY MEDICINE

## 2021-04-29 PROCEDURE — 84443 ASSAY THYROID STIM HORMONE: CPT | Performed by: FAMILY MEDICINE

## 2021-04-29 PROCEDURE — 84439 ASSAY OF FREE THYROXINE: CPT | Performed by: FAMILY MEDICINE

## 2021-04-29 NOTE — PROGRESS NOTES
"Chief Complaint  follow up to hypertension, follow up to hyperlipidemia, and follow up to hypothyroidism    Subjective          Karly Mcgrath presents to [facility]  History of Present Illness  Patient follows up for ongoing management of chronic problems of hypertension hyperlipidemia hypothyroidism she has had some elevated blood pressure readings although they related to stressful situations he has had a bout of constipation to the emergency department records reviewed for CT scan which was unrevealing her blood pressure was slightly elevated they gave her some hydralazine  Her lipids have been well controlled no unwanted side effects as atorvastatin  She is due for follow-up TSH and free T4 for hypothyroidism with those things with recent dosing adjustments  Has been taken the MiraLAX daily after the hospital visit and has now cleaned herself out and she will needs guidance as to how she should take her MiraLAX  Objective   Vital Signs:   /70 (BP Location: Right arm, Patient Position: Sitting, Cuff Size: Adult)   Pulse 88   Ht 165.1 cm (65\")   Wt 64.8 kg (142 lb 14.4 oz)   SpO2 98%   BMI 23.78 kg/m²     Physical Exam  Constitutional:       Appearance: Normal appearance. She is normal weight.   HENT:      Head: Normocephalic and atraumatic.   Neck:      Comments: No enlarged thyroid felt  Cardiovascular:      Rate and Rhythm: Normal rate and regular rhythm.      Pulses: Normal pulses.      Heart sounds: Normal heart sounds.   Pulmonary:      Effort: Pulmonary effort is normal.      Breath sounds: Normal breath sounds.   Abdominal:      General: Abdomen is flat.      Tenderness: There is no abdominal tenderness.   Musculoskeletal:      Right lower leg: No edema.      Left lower leg: No edema.   Neurological:      Mental Status: She is alert.   Psychiatric:         Mood and Affect: Mood normal.         Behavior: Behavior normal.         Thought Content: Thought content " normal.         Judgment: Judgment normal.        Result Review :     Common labs    Common Labsle 4/14/21 4/23/21 4/23/21 4/28/21     1247 1247    Glucose   93    BUN   8    Creatinine   0.86    eGFR Non African Am   64    Sodium   131 (A)    Potassium   4.0    Chloride   97 (A)    Calcium   9.6    Albumin   4.10    Total Bilirubin   0.7    Alkaline Phosphatase   70    AST (SGOT)   26    ALT (SGPT)   20    WBC 5.83 4.76  6.47   Hemoglobin 13.9 12.2  13.3   Hematocrit 38.0 35.1  36.7   Platelets 116 (A) 140  155   (A) Abnormal value            Data reviewed: Radiologic studies April 2021 CT scan abdomen no etiology determined for constipation          Assessment and Plan    Diagnoses and all orders for this visit:    1. Mixed hyperlipidemia (Primary)    2. Essential hypertension  -     Comprehensive Metabolic Panel    3. Hypothyroidism, unspecified type  -     TSH  -     T4, Free    4. Macrocytosis without anemia  -     Methylmalonic Acid, Serum    5. Other constipation    Recommend take MiraLAX every other day  Under blood pressure with a goal less than 150/90 she will call in 2 weeks with blood pressure reading results  Results of thyroid testing for hypothyroidism  Follow-up results of methylmalonic acid for be supplement  Continue statin therapy for hyperlipidemia    Follow Up   Return in about 6 months (around 10/29/2021), or if symptoms worsen or fail to improve, for Recheck.  Patient was given instructions and counseling regarding her condition or for health maintenance advice. Please see specific information pulled into the AVS if appropriate.

## 2021-05-06 LAB
Lab: NORMAL
METHYLMALONATE SERPL-SCNC: 205 NMOL/L (ref 0–378)

## 2021-05-10 ENCOUNTER — TELEPHONE (OUTPATIENT)
Dept: INTERNAL MEDICINE | Facility: CLINIC | Age: 76
End: 2021-05-10

## 2021-05-10 NOTE — TELEPHONE ENCOUNTER
Caller: Karly Mcgrath    Relationship to patient: Self    Best call back number: [phone]    Patient is needing: PATIENT STATED THAT HER BLOOD PRESSURE WAS OKAY LAST WEEK. STATED THAT SHE WAS ASKED TO CALL BACK AND CHECK IN WITH DR. FRIEND OR AMELIA.

## 2021-05-12 ENCOUNTER — LAB (OUTPATIENT)
Dept: OTHER | Facility: HOSPITAL | Age: 76
End: 2021-05-12

## 2021-05-12 ENCOUNTER — CLINICAL SUPPORT (OUTPATIENT)
Dept: ONCOLOGY | Facility: HOSPITAL | Age: 76
End: 2021-05-12

## 2021-05-12 VITALS
HEIGHT: 65 IN | OXYGEN SATURATION: 98 % | DIASTOLIC BLOOD PRESSURE: 100 MMHG | RESPIRATION RATE: 18 BRPM | SYSTOLIC BLOOD PRESSURE: 180 MMHG | TEMPERATURE: 97.1 F | WEIGHT: 141.2 LBS | HEART RATE: 74 BPM | BODY MASS INDEX: 23.53 KG/M2

## 2021-05-12 DIAGNOSIS — D69.3 IMMUNE THROMBOCYTOPENIC PURPURA (HCC): ICD-10-CM

## 2021-05-12 DIAGNOSIS — D50.9 IRON DEFICIENCY ANEMIA, UNSPECIFIED IRON DEFICIENCY ANEMIA TYPE: ICD-10-CM

## 2021-05-12 LAB
BASOPHILS # BLD AUTO: 0.05 10*3/MM3 (ref 0–0.2)
BASOPHILS NFR BLD AUTO: 0.8 % (ref 0–1.5)
DEPRECATED RDW RBC AUTO: 40.1 FL (ref 37–54)
EOSINOPHIL # BLD AUTO: 0.08 10*3/MM3 (ref 0–0.4)
EOSINOPHIL NFR BLD AUTO: 1.3 % (ref 0.3–6.2)
ERYTHROCYTE [DISTWIDTH] IN BLOOD BY AUTOMATED COUNT: 12.8 % (ref 12.3–15.4)
HCT VFR BLD AUTO: 39.1 % (ref 34–46.6)
HGB BLD-MCNC: 13.7 G/DL (ref 12–15.9)
IMM GRANULOCYTES # BLD AUTO: 0.03 10*3/MM3 (ref 0–0.05)
IMM GRANULOCYTES NFR BLD AUTO: 0.5 % (ref 0–0.5)
LYMPHOCYTES # BLD AUTO: 1.49 10*3/MM3 (ref 0.7–3.1)
LYMPHOCYTES NFR BLD AUTO: 23.5 % (ref 19.6–45.3)
MCH RBC QN AUTO: 30.3 PG (ref 26.6–33)
MCHC RBC AUTO-ENTMCNC: 35 G/DL (ref 31.5–35.7)
MCV RBC AUTO: 86.5 FL (ref 79–97)
MONOCYTES # BLD AUTO: 0.61 10*3/MM3 (ref 0.1–0.9)
MONOCYTES NFR BLD AUTO: 9.6 % (ref 5–12)
NEUTROPHILS NFR BLD AUTO: 4.08 10*3/MM3 (ref 1.7–7)
NEUTROPHILS NFR BLD AUTO: 64.3 % (ref 42.7–76)
NRBC BLD AUTO-RTO: 0 /100 WBC (ref 0–0.2)
PLATELET # BLD AUTO: 161 10*3/MM3 (ref 140–450)
PMV BLD AUTO: 11.5 FL (ref 6–12)
RBC # BLD AUTO: 4.52 10*6/MM3 (ref 3.77–5.28)
WBC # BLD AUTO: 6.34 10*3/MM3 (ref 3.4–10.8)

## 2021-05-12 PROCEDURE — G0463 HOSPITAL OUTPT CLINIC VISIT: HCPCS

## 2021-05-12 PROCEDURE — 85025 COMPLETE CBC W/AUTO DIFF WBC: CPT | Performed by: INTERNAL MEDICINE

## 2021-05-12 PROCEDURE — 36415 COLL VENOUS BLD VENIPUNCTURE: CPT

## 2021-05-12 RX ORDER — AMLODIPINE BESYLATE 2.5 MG/1
2.5 TABLET ORAL DAILY
Qty: 30 TABLET | Refills: 1 | Status: SHIPPED | OUTPATIENT
Start: 2021-05-12 | End: 2021-07-09

## 2021-05-12 NOTE — TELEPHONE ENCOUNTER
Prescription for amlodipine 2.5 mg 1 daily sent to pharmacy.  Continue monitoring blood pressure follow-up office appointment 1 week

## 2021-05-12 NOTE — NURSING NOTE
Here for CBC and review. No voiced concerns; however her B/P is elevated and she states that it is only that way when in this office. However, she is under the medical attention of her primary, Dr. Adame and this nurse informed her that she should call his office upon arrival home to report the B/P readings. (He has access to Epic). She denies headaches and has taken her prescribed antihypertensives this am.  Lab Results   Component Value Date    WBC 6.34 05/12/2021    HGB 13.7 05/12/2021    HCT 39.1 05/12/2021    MCV 86.5 05/12/2021     05/12/2021     Her follow-up was reviewed and it is with Dr. Muro 06- at 1640.  It is noted that she has received her COVID-19 vaccines. Also she states that she is presently not taking oral iron as it causes constipation.

## 2021-05-12 NOTE — TELEPHONE ENCOUNTER
Caller: Karly Mcgrath    Relationship to patient: Self    Best call back number: [phone]    Patient is needing: PATIENT STATES SHE SAW DR. LOPES TODAY AND HER BLOOD PRESSURE /100 IN OFFICE AND SHE CHECKED IT AGAIN AT HOME AND IT /83. DR. LOPES ADVISED PATIENT TO CONTACT DR. FRIEND TO SEE WHAT HE ADVISES FOR HIGH BLOOD PRESSURE. PATIENT IS NOT EXPERIENCING ANY OTHER SYMPTOMS.       Preferred Pharmacy: [facility] [address] & [place] - [phone] [facility] [phone]   [phone]

## 2021-05-19 RX ORDER — HYDROCHLOROTHIAZIDE 12.5 MG/1
TABLET ORAL
Qty: 30 TABLET | Refills: 0 | Status: SHIPPED | OUTPATIENT
Start: 2021-05-19 | End: 2021-06-15

## 2021-05-20 ENCOUNTER — OFFICE VISIT (OUTPATIENT)
Dept: INTERNAL MEDICINE | Facility: CLINIC | Age: 76
End: 2021-05-20

## 2021-05-20 VITALS
WEIGHT: 140.3 LBS | HEIGHT: 65 IN | SYSTOLIC BLOOD PRESSURE: 130 MMHG | HEART RATE: 78 BPM | DIASTOLIC BLOOD PRESSURE: 74 MMHG | OXYGEN SATURATION: 99 % | BODY MASS INDEX: 23.38 KG/M2 | RESPIRATION RATE: 16 BRPM | TEMPERATURE: 98 F

## 2021-05-20 DIAGNOSIS — I10 ESSENTIAL HYPERTENSION: Primary | ICD-10-CM

## 2021-05-20 PROCEDURE — 99213 OFFICE O/P EST LOW 20 MIN: CPT | Performed by: FAMILY MEDICINE

## 2021-05-20 NOTE — PROGRESS NOTES
"Chief Complaint  Blood Pressure Check    Subjective          Karly Mcgrath presents to [facility]  History of Present Illness  Patient follows up for hypertension has initiated recently amlodipine for elevated blood pressure readings no constipation no swelling she had slight increase in her creatinine as well and is taking diuretic therapy she has no muscle cramps she has been shy away from fluids in salt as well.  She feels her blood pressure is elevated when she goes to the hematologist because of worry  No chest pain shortness of breath or increased fatigue  Is concerned because she feels her hair is falling out her greater rate than usual.  No bald spots  Objective   Vital Signs:   /74   Pulse 78   Temp 98 °F (36.7 °C)   Resp 16   Ht 165.1 cm (65\")   Wt 63.6 kg (140 lb 4.8 oz)   SpO2 99%   BMI 23.35 kg/m²     Physical Exam  Constitutional:       Appearance: Normal appearance.   HENT:      Head: Normocephalic and atraumatic.   Cardiovascular:      Rate and Rhythm: Normal rate and regular rhythm.      Pulses: Normal pulses.      Heart sounds: Normal heart sounds.   Musculoskeletal:      Right lower leg: No edema.      Left lower leg: No edema.   Neurological:      Mental Status: She is alert.   Psychiatric:         Mood and Affect: Mood normal.         Behavior: Behavior normal.         Thought Content: Thought content normal.         Judgment: Judgment normal.        Result Review :     Common labs    Common Labsle 4/28/21 4/29/21 5/12/21   Glucose  99    BUN  19    Creatinine  1.07 (A)    eGFR Non African Am  50 (A)    Sodium  130 (A)    Potassium  4.2    Chloride  94 (A)    Calcium  9.8    Albumin  4.30    Total Bilirubin  0.6    Alkaline Phosphatase  74    AST (SGOT)  22    ALT (SGPT)  15    WBC 6.47  6.34   Hemoglobin 13.3  13.7   Hematocrit 36.7  39.1   Platelets 155  161   (A) Abnormal value                      Assessment and Plan {CC Problem List  Visit " Diagnosis   ROS  Review (Popup)  Health Maintenance  Quality  BestPractice  Medications  SmartSets  SnapShot Encounters  Media :23}   Diagnoses and all orders for this visit:    1. Essential hypertension (Primary)  -     Basic Metabolic Panel; Future    Monitor blood pressure if readings less than 110 then would decrease amlodipine she will otherwise follow-up future BMP  Encourage occasional salt and increase fluids    Follow Up   Return in about 1 month (around 6/20/2021), or if symptoms worsen or fail to improve, for Recheck.  Patient was given instructions and counseling regarding her condition or for health maintenance advice. Please see specific information pulled into the AVS if appropriate.

## 2021-06-07 RX ORDER — LOSARTAN POTASSIUM 50 MG/1
TABLET ORAL
Qty: 180 TABLET | Refills: 0 | OUTPATIENT
Start: 2021-06-07

## 2021-06-09 ENCOUNTER — OFFICE VISIT (OUTPATIENT)
Dept: ONCOLOGY | Facility: CLINIC | Age: 76
End: 2021-06-09

## 2021-06-09 ENCOUNTER — LAB (OUTPATIENT)
Dept: OTHER | Facility: HOSPITAL | Age: 76
End: 2021-06-09

## 2021-06-09 VITALS
HEIGHT: 65 IN | OXYGEN SATURATION: 99 % | RESPIRATION RATE: 16 BRPM | BODY MASS INDEX: 23.17 KG/M2 | SYSTOLIC BLOOD PRESSURE: 171 MMHG | TEMPERATURE: 97.1 F | DIASTOLIC BLOOD PRESSURE: 79 MMHG | WEIGHT: 139.1 LBS | HEART RATE: 69 BPM

## 2021-06-09 DIAGNOSIS — D51.3 OTHER DIETARY VITAMIN B12 DEFICIENCY ANEMIA: ICD-10-CM

## 2021-06-09 DIAGNOSIS — D50.9 IRON DEFICIENCY ANEMIA, UNSPECIFIED IRON DEFICIENCY ANEMIA TYPE: ICD-10-CM

## 2021-06-09 DIAGNOSIS — D69.3 IMMUNE THROMBOCYTOPENIC PURPURA (HCC): ICD-10-CM

## 2021-06-09 DIAGNOSIS — D69.3 IMMUNE THROMBOCYTOPENIC PURPURA (HCC): Primary | ICD-10-CM

## 2021-06-09 LAB
BASOPHILS # BLD AUTO: 0.02 10*3/MM3 (ref 0–0.2)
BASOPHILS NFR BLD AUTO: 0.4 % (ref 0–1.5)
DEPRECATED RDW RBC AUTO: 40.4 FL (ref 37–54)
EOSINOPHIL # BLD AUTO: 0.07 10*3/MM3 (ref 0–0.4)
EOSINOPHIL NFR BLD AUTO: 1.4 % (ref 0.3–6.2)
ERYTHROCYTE [DISTWIDTH] IN BLOOD BY AUTOMATED COUNT: 12.8 % (ref 12.3–15.4)
FERRITIN SERPL-MCNC: 63.6 NG/ML (ref 13–150)
HCT VFR BLD AUTO: 37.1 % (ref 34–46.6)
HGB BLD-MCNC: 12.9 G/DL (ref 12–15.9)
IMM GRANULOCYTES # BLD AUTO: 0.02 10*3/MM3 (ref 0–0.05)
IMM GRANULOCYTES NFR BLD AUTO: 0.4 % (ref 0–0.5)
IRON 24H UR-MRATE: 58 MCG/DL (ref 37–145)
IRON SATN MFR SERPL: 17 % (ref 20–50)
LYMPHOCYTES # BLD AUTO: 1.54 10*3/MM3 (ref 0.7–3.1)
LYMPHOCYTES NFR BLD AUTO: 30.9 % (ref 19.6–45.3)
MCH RBC QN AUTO: 30.2 PG (ref 26.6–33)
MCHC RBC AUTO-ENTMCNC: 34.8 G/DL (ref 31.5–35.7)
MCV RBC AUTO: 86.9 FL (ref 79–97)
MONOCYTES # BLD AUTO: 0.41 10*3/MM3 (ref 0.1–0.9)
MONOCYTES NFR BLD AUTO: 8.2 % (ref 5–12)
NEUTROPHILS NFR BLD AUTO: 2.92 10*3/MM3 (ref 1.7–7)
NEUTROPHILS NFR BLD AUTO: 58.7 % (ref 42.7–76)
NRBC BLD AUTO-RTO: 0 /100 WBC (ref 0–0.2)
PLATELET # BLD AUTO: 157 10*3/MM3 (ref 140–450)
PMV BLD AUTO: 10.9 FL (ref 6–12)
RBC # BLD AUTO: 4.27 10*6/MM3 (ref 3.77–5.28)
TIBC SERPL-MCNC: 350 MCG/DL (ref 298–536)
TRANSFERRIN SERPL-MCNC: 235 MG/DL (ref 200–360)
VIT B12 BLD-MCNC: 608 PG/ML (ref 211–946)
WBC # BLD AUTO: 4.98 10*3/MM3 (ref 3.4–10.8)

## 2021-06-09 PROCEDURE — 36415 COLL VENOUS BLD VENIPUNCTURE: CPT

## 2021-06-09 PROCEDURE — 82728 ASSAY OF FERRITIN: CPT | Performed by: INTERNAL MEDICINE

## 2021-06-09 PROCEDURE — 82607 VITAMIN B-12: CPT | Performed by: INTERNAL MEDICINE

## 2021-06-09 PROCEDURE — 84466 ASSAY OF TRANSFERRIN: CPT | Performed by: INTERNAL MEDICINE

## 2021-06-09 PROCEDURE — 85025 COMPLETE CBC W/AUTO DIFF WBC: CPT | Performed by: INTERNAL MEDICINE

## 2021-06-09 PROCEDURE — 83540 ASSAY OF IRON: CPT | Performed by: INTERNAL MEDICINE

## 2021-06-09 PROCEDURE — 99214 OFFICE O/P EST MOD 30 MIN: CPT | Performed by: INTERNAL MEDICINE

## 2021-06-09 RX ORDER — ANTIARTHRITIC COMBINATION NO.2 900 MG
5000 TABLET ORAL DAILY
Start: 2021-06-09

## 2021-06-09 RX ORDER — ASPIRIN 325 MG
1 TABLET ORAL DAILY
Start: 2021-06-09

## 2021-06-09 NOTE — PROGRESS NOTES
Subjective     CHIEF COMPLAINT:      Chief Complaint   Patient presents with   • Follow-up     no concerns       HISTORY OF PRESENT ILLNESS:     Karly Mcgrath is a 75 y.o. female patient who returns today for follow up on her thrombocytopenia and anemia. She is on vitamin B12 weekly. She was on oral iron until it was stopped when she went to the ER 6 weeks ago complaining of severe constipation. She had a CT scan done. She could not hold the enema. She was discharged home and took Miralax for a week and it eventually resolved the constipation. The constipation did not recur.     Patient repots hair thinning. She recently started taking a multivitamin daily.     ROS:  Pertinent ROS is in the HPI.     Past medical, surgical, social and family history were reviewed.     MEDICATIONS:    Current Outpatient Medications:   •  acyclovir (ZOVIRAX) 200 MG capsule, Take 1 capsule by mouth 5 (Five) Times a Day. (Patient taking differently: Take 200 mg by mouth 5 (Five) Times a Day As Needed (cold sores).), Disp: 25 capsule, Rfl: 2  •  acyclovir (ZOVIRAX) 5 % ointment, Apply  topically to the appropriate area as directed 5 (Five) Times a Day As Needed (cold sore)., Disp: 15 g, Rfl: 1  •  amLODIPine (NORVASC) 2.5 MG tablet, Take 1 tablet by mouth Daily., Disp: 30 tablet, Rfl: 1  •  atorvastatin (LIPITOR) 10 MG tablet, TAKE ONE TABLET BY MOUTH DAILY, Disp: 90 tablet, Rfl: 2  •  busPIRone (BUSPAR) 5 MG tablet, TAKE ONE TABLET BY MOUTH TWO TIMES A DAY FOR ANXIETY, Disp: 180 tablet, Rfl: 2  •  Calcium Carb-Cholecalciferol (CALCIUM 600 + D) 600-200 MG-UNIT tablet, Take 2,000 mg by mouth 2 (Two) Times a Day., Disp: , Rfl:   •  cholecalciferol (VITAMIN D3) 1000 units tablet, Take 2,000 Units by mouth Daily., Disp: , Rfl:   •  Cyanocobalamin (VITAMIN B 12 PO), Take 1,000 mcg by mouth 1 (One) Time Per Week., Disp: , Rfl:   •  hydroCHLOROthiazide (HYDRODIURIL) 12.5 MG tablet, TAKE ONE TABLET BY MOUTH DAILY, Disp: 30 tablet, Rfl: 0  •   "levothyroxine (Synthroid) 75 MCG tablet, Every other day on odd day., Disp: 30 tablet, Rfl: 0  •  levothyroxine (SYNTHROID, LEVOTHROID) 50 MCG tablet, Take every other day on even day, Disp: 45 tablet, Rfl: 2  •  losartan (COZAAR) 100 MG tablet, Take 1 tablet by mouth Daily., Disp: 90 tablet, Rfl: 2  •  metoprolol succinate XL (TOPROL-XL) 50 MG 24 hr tablet, TAKE ONE TABLET BY MOUTH DAILY, Disp: 90 tablet, Rfl: 2  •  omeprazole (priLOSEC) 40 MG capsule, Take 40 mg by mouth As Needed. Only takes if prednisone upsets stomach. Hasn't needed it so far.., Disp: , Rfl:     Current Facility-Administered Medications:   •  cyanocobalamin injection 1,000 mcg, 1,000 mcg, Intramuscular, Weekly, Vito Adame MD, 1,000 mcg at 03/09/20 1041    Objective   VITAL SIGNS:     Vitals:    06/09/21 1627   BP: 171/79   Pulse: 69   Resp: 16   Temp: 97.1 °F (36.2 °C)   TempSrc: Temporal   SpO2: 99%   Weight: 63.1 kg (139 lb 1.6 oz)   Height: 165.1 cm (65\")   PainSc: 0-No pain     Body mass index is 23.15 kg/m².     Wt Readings from Last 5 Encounters:   06/09/21 63.1 kg (139 lb 1.6 oz)   05/20/21 63.6 kg (140 lb 4.8 oz)   05/12/21 64 kg (141 lb 3.2 oz)   04/29/21 64.8 kg (142 lb 14.4 oz)   04/28/21 64.7 kg (142 lb 9.6 oz)       PHYSICAL EXAMINATION:   GENERAL: The patient appears in good general condition, not in acute distress.   SKIN: No ecchymosis.  EYES: No jaundice.  LYMPHATICS: No cervical lymphadenopathy.  CHEST: Normal respiratory effort.   CVS: No edema.  ABDOMEN: Non-distended.  EXTREMITIES: No deforming arthritis.     DIAGNOSTIC DATA:     Results from last 7 days   Lab Units 06/09/21  1547   WBC 10*3/mm3 4.98   NEUTROS ABS 10*3/mm3 2.92   HEMOGLOBIN g/dL 12.9   HEMATOCRIT % 37.1   PLATELETS 10*3/mm3 157     Component      Latest Ref Rng & Units 11/11/2020 1/6/2021 6/9/2021   Iron      37 - 145 mcg/dL 55 121 58   Iron Saturation      20 - 50 % 16 (L) 36 17 (L)   Transferrin      200 - 360 mg/dL 227 226 235   TIBC      298 - 536 " mcg/dL 338 337 350   Ferritin      13.00 - 150.00 ng/mL 52.80 57.90 63.60   Vitamin B-12      211 - 946 pg/mL 1,947 (H)  608       Assessment/Plan   *Acute thrombocytopenia diagnosed on 9/30/2020.    · Patient has prior history of chronic thrombocytopenia.  Platelet count was 119,000 on 8/28/2019.   · Platelet count decreased to 44,000 on 9/10/2020.   · Platelet count decreased to 11,000 on 9/30/2020.   · There was no evidence of leukemia or lymphoma on flow cytometry on 9/30/2020.  · Patient was started on prednisone 1 mg/kg daily on 9/30/2020.  She responded with improvement in the platelet count to 61,000 on 10/5/2020.    · Platelet count remains >150,000. The dose of prednisone has been gradually tapered with weekly monitoring of her platelet count.   · The dose of prednisone was slowly tapered.  · After the dose was reduced to 10 mg a day, platelets decreased to 124,000 on 12/23/2020.  · The dose was increased back to 20 mg a day on 12/23/2020.  · Platelet count improved 159,000 on 1/6/2021.  · The dose was slowly tapered afterwards. Prednisone was stopped on 3/3/2021.  · Platelets are normal today at 157,000. She is asymptomatic.     *Iron deficiency anemia.  · Hemoglobin was 11.1 on 9/10/2020.  · Patient was started on ferrous sulfate 325 mg daily on 9/30/2020.  · Hemoglobin improved to 13.6 on 10/14/2020.  · Hemoglobin improved to 14.8 on 1/6/2021. Iron stores improved.   · Ferrous sulfate changed to once weekly on 1/6/2021.  · Patient was tolerating the ferrous sulfate until April 2021 when she developed severe constipation and went to the ER. Ferrous sulfate was stopped.  · Her hemoglobin decreased to 12.9 today. Iron stores decreased with transferrin saturation decreasing to 17% today.     *Vitamin B12 deficiency.  · Patient was on B12 1000 mcg orally daily.  · B12 level became elevated at 1947 on 11/11/2020.  · B12 was reduced to once weekly on 10/14/2020.   · Vitamin B12 level decreased to 608  today.    *Hair thinning.     PLAN:    1.  Continue Vitamin B12 1000 mcg weekly.  2.  Start a multivitamin with iron once daily.  3.  Start Biotin 5000 mcg daily.  4.  Return every 2 months for a CBC and RN review.   5.  Follow up in 6 months with a CBC Ferritin iron panel and B12 levels.         Garcia Muro MD  06/09/21

## 2021-06-15 RX ORDER — HYDROCHLOROTHIAZIDE 12.5 MG/1
TABLET ORAL
Qty: 30 TABLET | Refills: 2 | Status: SHIPPED | OUTPATIENT
Start: 2021-06-15 | End: 2021-09-10

## 2021-07-09 RX ORDER — AMLODIPINE BESYLATE 2.5 MG/1
TABLET ORAL
Qty: 30 TABLET | Refills: 4 | Status: SHIPPED | OUTPATIENT
Start: 2021-07-09 | End: 2021-12-06

## 2021-07-12 ENCOUNTER — TELEPHONE (OUTPATIENT)
Dept: INTERNAL MEDICINE | Facility: CLINIC | Age: 76
End: 2021-07-12

## 2021-07-12 NOTE — TELEPHONE ENCOUNTER
Caller: Karly Mcgrath    Relationship: Self    Best call back number: [phone]     What is the best time to reach you: ANYTIME    Who are you requesting to speak with MA OR DOCTOR      PATIENT STATES SHE HAS A COUGH WITH STUFF COMING UP AND RAN A SLIGHT TEMP OVER THE WEEKEND TOOK TYLENOL TO GET THAT DONE BUT STILL HAS COUGH NOT SURE IF SHE NEEDS TO BE SEEN.

## 2021-07-13 NOTE — TELEPHONE ENCOUNTER
Patient notified and expressed understanding.  She said that she is feeling better, doesn't have a fever any more but will try the Robitussin DM and will come in for an appointment if she needs to.

## 2021-08-04 ENCOUNTER — LAB (OUTPATIENT)
Dept: OTHER | Facility: HOSPITAL | Age: 76
End: 2021-08-04

## 2021-08-04 ENCOUNTER — CLINICAL SUPPORT (OUTPATIENT)
Dept: ONCOLOGY | Facility: HOSPITAL | Age: 76
End: 2021-08-04

## 2021-08-04 VITALS
BODY MASS INDEX: 22.56 KG/M2 | OXYGEN SATURATION: 99 % | HEIGHT: 65 IN | RESPIRATION RATE: 18 BRPM | TEMPERATURE: 96.9 F | DIASTOLIC BLOOD PRESSURE: 72 MMHG | SYSTOLIC BLOOD PRESSURE: 148 MMHG | WEIGHT: 135.4 LBS | HEART RATE: 66 BPM

## 2021-08-04 DIAGNOSIS — D69.3 IMMUNE THROMBOCYTOPENIC PURPURA (HCC): ICD-10-CM

## 2021-08-04 DIAGNOSIS — D50.9 IRON DEFICIENCY ANEMIA, UNSPECIFIED IRON DEFICIENCY ANEMIA TYPE: ICD-10-CM

## 2021-08-04 DIAGNOSIS — D51.3 OTHER DIETARY VITAMIN B12 DEFICIENCY ANEMIA: ICD-10-CM

## 2021-08-04 DIAGNOSIS — D69.3 IMMUNE THROMBOCYTOPENIC PURPURA (HCC): Primary | ICD-10-CM

## 2021-08-04 LAB
BASOPHILS # BLD AUTO: 0.04 10*3/MM3 (ref 0–0.2)
BASOPHILS NFR BLD AUTO: 0.6 % (ref 0–1.5)
DEPRECATED RDW RBC AUTO: 41.1 FL (ref 37–54)
EOSINOPHIL # BLD AUTO: 0.12 10*3/MM3 (ref 0–0.4)
EOSINOPHIL NFR BLD AUTO: 1.7 % (ref 0.3–6.2)
ERYTHROCYTE [DISTWIDTH] IN BLOOD BY AUTOMATED COUNT: 13.2 % (ref 12.3–15.4)
HCT VFR BLD AUTO: 40.2 % (ref 34–46.6)
HGB BLD-MCNC: 13.7 G/DL (ref 12–15.9)
IMM GRANULOCYTES # BLD AUTO: 0.03 10*3/MM3 (ref 0–0.05)
IMM GRANULOCYTES NFR BLD AUTO: 0.4 % (ref 0–0.5)
LYMPHOCYTES # BLD AUTO: 1.4 10*3/MM3 (ref 0.7–3.1)
LYMPHOCYTES NFR BLD AUTO: 20.2 % (ref 19.6–45.3)
MCH RBC QN AUTO: 29.7 PG (ref 26.6–33)
MCHC RBC AUTO-ENTMCNC: 34.1 G/DL (ref 31.5–35.7)
MCV RBC AUTO: 87 FL (ref 79–97)
MONOCYTES # BLD AUTO: 0.62 10*3/MM3 (ref 0.1–0.9)
MONOCYTES NFR BLD AUTO: 9 % (ref 5–12)
NEUTROPHILS NFR BLD AUTO: 4.71 10*3/MM3 (ref 1.7–7)
NEUTROPHILS NFR BLD AUTO: 68.1 % (ref 42.7–76)
NRBC BLD AUTO-RTO: 0 /100 WBC (ref 0–0.2)
PLATELET # BLD AUTO: 139 10*3/MM3 (ref 140–450)
PMV BLD AUTO: 11.9 FL (ref 6–12)
RBC # BLD AUTO: 4.62 10*6/MM3 (ref 3.77–5.28)
WBC # BLD AUTO: 6.92 10*3/MM3 (ref 3.4–10.8)

## 2021-08-04 PROCEDURE — 85025 COMPLETE CBC W/AUTO DIFF WBC: CPT | Performed by: INTERNAL MEDICINE

## 2021-08-04 PROCEDURE — G0463 HOSPITAL OUTPT CLINIC VISIT: HCPCS

## 2021-08-04 PROCEDURE — 36415 COLL VENOUS BLD VENIPUNCTURE: CPT

## 2021-08-04 NOTE — NURSING NOTE
Pt is here for lab with RN review.  CBC reviewed with pt, counts are stable for this pt at this time. Pt has no complaints.  Copy of labs given to pt and f/u appt reviewed. Pt is instructed to call the office with any concerns or new symptoms prior to next visit. Pt vu     Discussed platelet count all questions answered to her satisfaction.

## 2021-08-09 RX ORDER — BUSPIRONE HYDROCHLORIDE 5 MG/1
TABLET ORAL
Qty: 180 TABLET | Refills: 0 | Status: SHIPPED | OUTPATIENT
Start: 2021-08-09 | End: 2021-10-29

## 2021-09-10 RX ORDER — HYDROCHLOROTHIAZIDE 12.5 MG/1
TABLET ORAL
Qty: 30 TABLET | Refills: 2 | Status: SHIPPED | OUTPATIENT
Start: 2021-09-10 | End: 2021-12-08

## 2021-09-29 ENCOUNTER — LAB (OUTPATIENT)
Dept: OTHER | Facility: HOSPITAL | Age: 76
End: 2021-09-29

## 2021-09-29 ENCOUNTER — CLINICAL SUPPORT (OUTPATIENT)
Dept: ONCOLOGY | Facility: HOSPITAL | Age: 76
End: 2021-09-29

## 2021-09-29 VITALS
OXYGEN SATURATION: 99 % | RESPIRATION RATE: 18 BRPM | HEART RATE: 71 BPM | WEIGHT: 137.4 LBS | BODY MASS INDEX: 22.89 KG/M2 | HEIGHT: 65 IN | TEMPERATURE: 96.9 F

## 2021-09-29 DIAGNOSIS — D69.3 IMMUNE THROMBOCYTOPENIC PURPURA (HCC): ICD-10-CM

## 2021-09-29 DIAGNOSIS — D50.9 IRON DEFICIENCY ANEMIA, UNSPECIFIED IRON DEFICIENCY ANEMIA TYPE: ICD-10-CM

## 2021-09-29 DIAGNOSIS — D51.3 OTHER DIETARY VITAMIN B12 DEFICIENCY ANEMIA: ICD-10-CM

## 2021-09-29 LAB
BASOPHILS # BLD AUTO: 0.04 10*3/MM3 (ref 0–0.2)
BASOPHILS NFR BLD AUTO: 0.6 % (ref 0–1.5)
DEPRECATED RDW RBC AUTO: 40.5 FL (ref 37–54)
EOSINOPHIL # BLD AUTO: 0.1 10*3/MM3 (ref 0–0.4)
EOSINOPHIL NFR BLD AUTO: 1.5 % (ref 0.3–6.2)
ERYTHROCYTE [DISTWIDTH] IN BLOOD BY AUTOMATED COUNT: 12.9 % (ref 12.3–15.4)
HCT VFR BLD AUTO: 41.1 % (ref 34–46.6)
HGB BLD-MCNC: 14.3 G/DL (ref 12–15.9)
IMM GRANULOCYTES # BLD AUTO: 0.07 10*3/MM3 (ref 0–0.05)
IMM GRANULOCYTES NFR BLD AUTO: 1 % (ref 0–0.5)
LYMPHOCYTES # BLD AUTO: 1.42 10*3/MM3 (ref 0.7–3.1)
LYMPHOCYTES NFR BLD AUTO: 21.2 % (ref 19.6–45.3)
MCH RBC QN AUTO: 30 PG (ref 26.6–33)
MCHC RBC AUTO-ENTMCNC: 34.8 G/DL (ref 31.5–35.7)
MCV RBC AUTO: 86.2 FL (ref 79–97)
MONOCYTES # BLD AUTO: 0.58 10*3/MM3 (ref 0.1–0.9)
MONOCYTES NFR BLD AUTO: 8.7 % (ref 5–12)
NEUTROPHILS NFR BLD AUTO: 4.48 10*3/MM3 (ref 1.7–7)
NEUTROPHILS NFR BLD AUTO: 67 % (ref 42.7–76)
NRBC BLD AUTO-RTO: 0 /100 WBC (ref 0–0.2)
PLATELET # BLD AUTO: 151 10*3/MM3 (ref 140–450)
PMV BLD AUTO: 11.7 FL (ref 6–12)
RBC # BLD AUTO: 4.77 10*6/MM3 (ref 3.77–5.28)
WBC # BLD AUTO: 6.69 10*3/MM3 (ref 3.4–10.8)

## 2021-09-29 PROCEDURE — 85025 COMPLETE CBC W/AUTO DIFF WBC: CPT | Performed by: INTERNAL MEDICINE

## 2021-09-29 PROCEDURE — G0463 HOSPITAL OUTPT CLINIC VISIT: HCPCS

## 2021-09-29 PROCEDURE — 36415 COLL VENOUS BLD VENIPUNCTURE: CPT

## 2021-09-29 NOTE — NURSING NOTE
Pt is here for lab with RN review.  CBC reviewed with pt, counts are stable for this pt at this time. PLT count improved to 151 today. Pt has no complaints.  Copy of labs given to pt and f/u appt reviewed. Pt is instructed to call the office with any concerns or new symptoms prior to next visit. Pt vu  Lab Results   Component Value Date    WBC 6.69 09/29/2021    HGB 14.3 09/29/2021    HCT 41.1 09/29/2021    MCV 86.2 09/29/2021     09/29/2021

## 2021-10-22 ENCOUNTER — IMMUNIZATION (OUTPATIENT)
Dept: VACCINE CLINIC | Facility: HOSPITAL | Age: 76
End: 2021-10-22

## 2021-10-22 PROCEDURE — 91300 HC SARSCOV02 VAC 30MCG/0.3ML IM: CPT | Performed by: INTERNAL MEDICINE

## 2021-10-22 PROCEDURE — 0004A ADM SARSCOV2 30MCG/0.3ML BOOSTER: CPT | Performed by: INTERNAL MEDICINE

## 2021-10-29 ENCOUNTER — OFFICE VISIT (OUTPATIENT)
Dept: INTERNAL MEDICINE | Facility: CLINIC | Age: 76
End: 2021-10-29

## 2021-10-29 VITALS
SYSTOLIC BLOOD PRESSURE: 118 MMHG | DIASTOLIC BLOOD PRESSURE: 60 MMHG | HEART RATE: 66 BPM | OXYGEN SATURATION: 98 % | WEIGHT: 138.5 LBS | BODY MASS INDEX: 23.07 KG/M2 | HEIGHT: 65 IN

## 2021-10-29 DIAGNOSIS — Z00.00 MEDICARE ANNUAL WELLNESS VISIT, SUBSEQUENT: ICD-10-CM

## 2021-10-29 DIAGNOSIS — E78.2 MIXED HYPERLIPIDEMIA: Primary | ICD-10-CM

## 2021-10-29 DIAGNOSIS — I10 PRIMARY HYPERTENSION: ICD-10-CM

## 2021-10-29 DIAGNOSIS — Z12.31 ENCOUNTER FOR SCREENING MAMMOGRAM FOR MALIGNANT NEOPLASM OF BREAST: ICD-10-CM

## 2021-10-29 DIAGNOSIS — E03.9 HYPOTHYROIDISM, UNSPECIFIED TYPE: ICD-10-CM

## 2021-10-29 PROCEDURE — 1170F FXNL STATUS ASSESSED: CPT | Performed by: FAMILY MEDICINE

## 2021-10-29 PROCEDURE — 1126F AMNT PAIN NOTED NONE PRSNT: CPT | Performed by: FAMILY MEDICINE

## 2021-10-29 PROCEDURE — 1160F RVW MEDS BY RX/DR IN RCRD: CPT | Performed by: FAMILY MEDICINE

## 2021-10-29 PROCEDURE — G0439 PPPS, SUBSEQ VISIT: HCPCS | Performed by: FAMILY MEDICINE

## 2021-10-29 PROCEDURE — 99214 OFFICE O/P EST MOD 30 MIN: CPT | Performed by: FAMILY MEDICINE

## 2021-10-29 NOTE — PROGRESS NOTES
The ABCs of the Annual Wellness Visit  Subsequent Medicare Wellness Visit    Chief Complaint   Patient presents with   • follow up to hypertension   • follow up to hyperlipidemia   • follow up to hypothyroidism   • Medicare Wellness-subsequent      Subjective    History of Present Illness:  Karly Mcgrath is a 76 y.o. female who presents for a Subsequent Medicare Wellness Visit.    The following portions of the patient's history were reviewed and   updated as appropriate: allergies, current medications, past family history, past medical history, past social history, past surgical history and problem list.     Compared to one year ago, the patient feels her physical   health is better.    Compared to one year ago, the patient feels her mental   health is better.    Recent Hospitalizations:  She was not admitted to the hospital during the last year.       Current Medical Providers:  Patient Care Team:  Vito Adame MD as PCP - General (Family Medicine)  Landry Hoffman MD as PCP - Family Medicine  Vito Adame MD as Referring Physician (Family Medicine)  Garcia Muro MD as Consulting Physician (Hematology and Oncology)    Outpatient Medications Prior to Visit   Medication Sig Dispense Refill   • acyclovir (ZOVIRAX) 200 MG capsule Take 1 capsule by mouth 5 (Five) Times a Day. (Patient taking differently: Take 200 mg by mouth 5 (Five) Times a Day As Needed (cold sores).) 25 capsule 2   • acyclovir (ZOVIRAX) 5 % ointment Apply  topically to the appropriate area as directed 5 (Five) Times a Day As Needed (cold sore). 15 g 1   • amLODIPine (NORVASC) 2.5 MG tablet TAKE ONE TABLET BY MOUTH DAILY 30 tablet 4   • atorvastatin (LIPITOR) 10 MG tablet TAKE ONE TABLET BY MOUTH DAILY 90 tablet 2   • Biotin 5000 MCG tablet Take 5,000 mcg by mouth Daily.     • Calcium Carb-Cholecalciferol (CALCIUM 600 + D) 600-200 MG-UNIT tablet Take 2,000 mg by mouth 2 (Two) Times a Day.     • cholecalciferol (VITAMIN D3) 1000 units  tablet Take 2,000 Units by mouth Daily.     • Cyanocobalamin (VITAMIN B 12 PO) Take 1,000 mcg by mouth 1 (One) Time Per Week.     • hydroCHLOROthiazide (HYDRODIURIL) 12.5 MG tablet TAKE ONE TABLET BY MOUTH DAILY 30 tablet 2   • levothyroxine (Synthroid) 75 MCG tablet Every other day on odd day. 30 tablet 0   • levothyroxine (SYNTHROID, LEVOTHROID) 50 MCG tablet Take every other day on even day 45 tablet 2   • losartan (COZAAR) 100 MG tablet Take 1 tablet by mouth Daily. 90 tablet 2   • metoprolol succinate XL (TOPROL-XL) 50 MG 24 hr tablet TAKE ONE TABLET BY MOUTH DAILY 90 tablet 2   • Multiple Vitamins-Iron (One Daily Multivitamin/Iron) tablet Take 1 tablet by mouth Daily.     • omeprazole (priLOSEC) 40 MG capsule Take 40 mg by mouth As Needed. Only takes if prednisone upsets stomach. Hasn't needed it so far..     • busPIRone (BUSPAR) 5 MG tablet TAKE ONE TABLET BY MOUTH TWICE A DAY FOR ANXIETY 180 tablet 0     Facility-Administered Medications Prior to Visit   Medication Dose Route Frequency Provider Last Rate Last Admin   • cyanocobalamin injection 1,000 mcg  1,000 mcg Intramuscular Weekly Vito Adame MD   1,000 mcg at 03/09/20 1041       No opioid medication identified on active medication list. I have reviewed chart for other potential  high risk medication/s and harmful drug interactions in the elderly.          Aspirin is not on active medication list.  Aspirin use is not indicated based on review of current medical condition/s. Risk of harm outweighs potential benefits.  .    Patient Active Problem List   Diagnosis   • Anxiety   • Bronchiectasis without complication (HCC)   • Hyperlipidemia   • Hypertension   • Osteopenia   • Hypothyroidism   • Health care maintenance   • Herpes simplex type 1 infection   • Urethritis   • Hypovitaminosis D   • Medicare annual wellness visit, initial   • Menopausal and postmenopausal disorder   • Vegetarian   • Thrombocytopenia (HCC)   • Medicare annual wellness visit,  "subsequent   • Anemia due to vitamin B12 deficiency   • Immune thrombocytopenic purpura (HCC)   • Other constipation   • Encounter for screening mammogram for malignant neoplasm of breast     Advance Care Planning   Advance Directive is not on file.  ACP discussion was held with the patient during this visit. Patient does not have an advance directive, information provided.          Objective       Vitals:    10/29/21 1014   BP: 118/60   BP Location: Left arm   Patient Position: Sitting   Cuff Size: Adult   Pulse: 66   SpO2: 98%   Weight: 62.8 kg (138 lb 8 oz)   Height: 164.5 cm (64.75\")   PainSc: 0-No pain     BMI Readings from Last 1 Encounters:   10/29/21 23.23 kg/m²   BMI is within normal parameters. No follow-up required.    Does the patient have evidence of cognitive impairment? No    Physical Exam            HEALTH RISK ASSESSMENT    Smoking Status:  Social History     Tobacco Use   Smoking Status Never Smoker   Smokeless Tobacco Never Used     Alcohol Consumption:  Social History     Substance and Sexual Activity   Alcohol Use No     Fall Risk Screen:    STEADI Fall Risk Assessment was completed, and patient is at LOW risk for falls.Assessment completed on:10/29/2021    Depression Screening:  PHQ-2/PHQ-9 Depression Screening 10/29/2021   Little interest or pleasure in doing things 0   Feeling down, depressed, or hopeless 0   Trouble falling or staying asleep, or sleeping too much 0   Feeling tired or having little energy 0   Poor appetite or overeating 0   Feeling bad about yourself - or that you are a failure or have let yourself or your family down 0   Trouble concentrating on things, such as reading the newspaper or watching television 0   Moving or speaking so slowly that other people could have noticed. Or the opposite - being so fidgety or restless that you have been moving around a lot more than usual 0   Thoughts that you would be better off dead, or of hurting yourself in some way 0   Total Score 0 "   If you checked off any problems, how difficult have these problems made it for you to do your work, take care of things at home, or get along with other people? Not difficult at all       Health Habits and Functional and Cognitive Screening:  Functional & Cognitive Status 10/29/2021   Do you have difficulty preparing food and eating? No   Do you have difficulty bathing yourself, getting dressed or grooming yourself? No   Do you have difficulty using the toilet? No   Do you have difficulty moving around from place to place? No   Do you have trouble with steps or getting out of a bed or a chair? No   Current Diet Well Balanced Diet   Dental Exam Up to date   Eye Exam Not up to date   Exercise (times per week) 7 times per week   Current Exercises Include Walking;House Cleaning   Current Exercise Activities Include -   Do you need help using the phone?  No   Are you deaf or do you have serious difficulty hearing?  Yes   Do you need help with transportation? No   Do you need help shopping? No   Do you need help preparing meals?  No   Do you need help with housework?  No   Do you need help with laundry? No   Do you need help taking your medications? No   Do you need help managing money? No   Do you ever drive or ride in a car without wearing a seat belt? No   Have you felt unusual stress, anger or loneliness in the last month? No   Who do you live with? Spouse   If you need help, do you have trouble finding someone available to you? No   Have you been bothered in the last four weeks by sexual problems? No   Do you have difficulty concentrating, remembering or making decisions? No       Age-appropriate Screening Schedule:  Refer to the list below for future screening recommendations based on patient's age, sex and/or medical conditions. Orders for these recommended tests are listed in the plan section. The patient has been provided with a written plan.    Health Maintenance   Topic Date Due   • ZOSTER VACCINE (1 of 2)  Never done   • MAMMOGRAM  04/15/2021   • DXA SCAN  06/12/2021   • LIPID PANEL  09/10/2021   • INFLUENZA VACCINE  Discontinued   • TDAP/TD VACCINES  Discontinued              Assessment/Plan     CMS Preventative Services Quick Reference  Risk Factors Identified During Encounter  thrombcytopenia  The above risks/problems have been discussed with the patient.  Follow up actions/plans if indicated are seen below in the Assessment/Plan Section.  Pertinent information has been shared with the patient in the After Visit Summary.    Diagnoses and all orders for this visit:    1. Mixed hyperlipidemia (Primary)  -     Lipid Panel    2. Primary hypertension    3. Hypothyroidism, unspecified type  -     TSH  -     T4, Free    4. Medicare annual wellness visit, subsequent    5. Encounter for screening mammogram for malignant neoplasm of breast  -     Mammo Screening Bilateral With CAD; Future        Follow Up:   Return in about 6 months (around 4/29/2022), or if symptoms worsen or fail to improve, for Recheck.     An After Visit Summary and PPPS were given to the patient.  Ongoing  management of chronic medical problems.

## 2021-10-29 NOTE — PROGRESS NOTES
"Chief Complaint  follow up to hypertension, follow up to hyperlipidemia, follow up to hypothyroidism, and Medicare Wellness-subsequent    Subjective          Karly Mcgrath presents to [facility]  History of Present Illness  Follow-up ongoing management of chronic medical problems hyperlipidemia hypertension hypothyroidism Home blood pressures less than 140/90 no chest pain shortness of breath or increased fatigue or unwanted side effects of statin therapy she has stopped taking the BuSpar for anxiety and she feels stable and no difference with or without it is rotating doses of levothyroxine for hypothyroidism previous TSH was in April  Objective   Vital Signs:   /60 (BP Location: Left arm, Patient Position: Sitting, Cuff Size: Adult)   Pulse 66   Ht 164.5 cm (64.75\")   Wt 62.8 kg (138 lb 8 oz)   SpO2 98%   BMI 23.23 kg/m²     Physical Exam  Constitutional:       Appearance: Normal appearance.   HENT:      Head: Normocephalic and atraumatic.   Cardiovascular:      Rate and Rhythm: Normal rate and regular rhythm.      Pulses: Normal pulses.   Pulmonary:      Effort: Pulmonary effort is normal.      Breath sounds: Normal breath sounds.   Musculoskeletal:      Right lower leg: No edema.      Left lower leg: No edema.   Neurological:      Mental Status: She is alert.   Psychiatric:         Mood and Affect: Mood normal.         Behavior: Behavior normal.         Thought Content: Thought content normal.         Judgment: Judgment normal.        Result Review :     Common labs    Common Labsle 6/9/21 8/4/21 9/29/21   WBC 4.98 6.92 6.69   Hemoglobin 12.9 13.7 14.3   Hematocrit 37.1 40.2 41.1   Platelets 157 139 (A) 151   (A) Abnormal value                      Assessment and Plan    Diagnoses and all orders for this visit:    1. Mixed hyperlipidemia (Primary)  -     Lipid Panel    2. Primary hypertension    3. Hypothyroidism, unspecified type  -     TSH  -     T4, Free    4. " Medicare annual wellness visit, subsequent    5. Encounter for screening mammogram for malignant neoplasm of breast  -     Mammo Screening Bilateral With CAD; Future    Hypertension continue amlodipine  Hyperlipidemia continue atorvastatin  Hypothyroidism continue levothyroxine rotating doses of 50 mcg and 75 mcg  Up results of testing otherwise as needed or    Follow Up   Return in about 6 months (around 4/29/2022), or if symptoms worsen or fail to improve, for Recheck.  Patient was given instructions and counseling regarding her condition or for health maintenance advice. Please see specific information pulled into the AVS if appropriate.

## 2021-11-05 RX ORDER — LEVOTHYROXINE SODIUM 0.07 MG/1
TABLET ORAL
Qty: 90 TABLET | Refills: 0 | Status: SHIPPED | OUTPATIENT
Start: 2021-11-05 | End: 2022-02-08 | Stop reason: SDUPTHER

## 2021-11-09 RX ORDER — METOPROLOL SUCCINATE 50 MG/1
TABLET, EXTENDED RELEASE ORAL
Qty: 90 TABLET | Refills: 1 | Status: SHIPPED | OUTPATIENT
Start: 2021-11-09 | End: 2022-05-09

## 2021-11-24 ENCOUNTER — OFFICE VISIT (OUTPATIENT)
Dept: ONCOLOGY | Facility: CLINIC | Age: 76
End: 2021-11-24

## 2021-11-24 ENCOUNTER — LAB (OUTPATIENT)
Dept: OTHER | Facility: HOSPITAL | Age: 76
End: 2021-11-24

## 2021-11-24 VITALS
BODY MASS INDEX: 23.16 KG/M2 | TEMPERATURE: 96.9 F | WEIGHT: 139 LBS | HEIGHT: 65 IN | DIASTOLIC BLOOD PRESSURE: 78 MMHG | OXYGEN SATURATION: 98 % | HEART RATE: 67 BPM | RESPIRATION RATE: 16 BRPM | SYSTOLIC BLOOD PRESSURE: 162 MMHG

## 2021-11-24 DIAGNOSIS — D51.3 OTHER DIETARY VITAMIN B12 DEFICIENCY ANEMIA: ICD-10-CM

## 2021-11-24 DIAGNOSIS — D69.3 IMMUNE THROMBOCYTOPENIC PURPURA (HCC): ICD-10-CM

## 2021-11-24 DIAGNOSIS — D69.3 IMMUNE THROMBOCYTOPENIC PURPURA (HCC): Primary | ICD-10-CM

## 2021-11-24 DIAGNOSIS — D50.9 IRON DEFICIENCY ANEMIA, UNSPECIFIED IRON DEFICIENCY ANEMIA TYPE: ICD-10-CM

## 2021-11-24 LAB
BASOPHILS # BLD AUTO: 0.03 10*3/MM3 (ref 0–0.2)
BASOPHILS NFR BLD AUTO: 0.4 % (ref 0–1.5)
DEPRECATED RDW RBC AUTO: 42.2 FL (ref 37–54)
EOSINOPHIL # BLD AUTO: 0.15 10*3/MM3 (ref 0–0.4)
EOSINOPHIL NFR BLD AUTO: 2.2 % (ref 0.3–6.2)
ERYTHROCYTE [DISTWIDTH] IN BLOOD BY AUTOMATED COUNT: 12.9 % (ref 12.3–15.4)
FERRITIN SERPL-MCNC: 103.5 NG/ML (ref 13–150)
HCT VFR BLD AUTO: 38.4 % (ref 34–46.6)
HGB BLD-MCNC: 13.1 G/DL (ref 12–15.9)
IMM GRANULOCYTES # BLD AUTO: 0.04 10*3/MM3 (ref 0–0.05)
IMM GRANULOCYTES NFR BLD AUTO: 0.6 % (ref 0–0.5)
IRON 24H UR-MRATE: 120 MCG/DL (ref 37–145)
IRON SATN MFR SERPL: 37 % (ref 20–50)
LYMPHOCYTES # BLD AUTO: 1.53 10*3/MM3 (ref 0.7–3.1)
LYMPHOCYTES NFR BLD AUTO: 22.8 % (ref 19.6–45.3)
MCH RBC QN AUTO: 30.5 PG (ref 26.6–33)
MCHC RBC AUTO-ENTMCNC: 34.1 G/DL (ref 31.5–35.7)
MCV RBC AUTO: 89.5 FL (ref 79–97)
MONOCYTES # BLD AUTO: 0.58 10*3/MM3 (ref 0.1–0.9)
MONOCYTES NFR BLD AUTO: 8.6 % (ref 5–12)
NEUTROPHILS NFR BLD AUTO: 4.38 10*3/MM3 (ref 1.7–7)
NEUTROPHILS NFR BLD AUTO: 65.4 % (ref 42.7–76)
NRBC BLD AUTO-RTO: 0 /100 WBC (ref 0–0.2)
PLATELET # BLD AUTO: 159 10*3/MM3 (ref 140–450)
PMV BLD AUTO: 11.4 FL (ref 6–12)
RBC # BLD AUTO: 4.29 10*6/MM3 (ref 3.77–5.28)
TIBC SERPL-MCNC: 323 MCG/DL (ref 298–536)
TRANSFERRIN SERPL-MCNC: 217 MG/DL (ref 200–360)
VIT B12 BLD-MCNC: 499 PG/ML (ref 211–946)
WBC NRBC COR # BLD: 6.71 10*3/MM3 (ref 3.4–10.8)

## 2021-11-24 PROCEDURE — 84466 ASSAY OF TRANSFERRIN: CPT | Performed by: INTERNAL MEDICINE

## 2021-11-24 PROCEDURE — 82728 ASSAY OF FERRITIN: CPT | Performed by: INTERNAL MEDICINE

## 2021-11-24 PROCEDURE — 36415 COLL VENOUS BLD VENIPUNCTURE: CPT

## 2021-11-24 PROCEDURE — 85025 COMPLETE CBC W/AUTO DIFF WBC: CPT | Performed by: INTERNAL MEDICINE

## 2021-11-24 PROCEDURE — 83540 ASSAY OF IRON: CPT | Performed by: INTERNAL MEDICINE

## 2021-11-24 PROCEDURE — 99213 OFFICE O/P EST LOW 20 MIN: CPT | Performed by: INTERNAL MEDICINE

## 2021-11-24 PROCEDURE — 82607 VITAMIN B-12: CPT | Performed by: INTERNAL MEDICINE

## 2021-11-24 RX ORDER — ATORVASTATIN CALCIUM 10 MG/1
TABLET, FILM COATED ORAL
Qty: 90 TABLET | Refills: 2 | Status: SHIPPED | OUTPATIENT
Start: 2021-11-24 | End: 2022-08-22

## 2021-11-24 NOTE — PROGRESS NOTES
Subjective     CHIEF COMPLAINT:      Chief Complaint   Patient presents with   • Follow-up     no concerns       HISTORY OF PRESENT ILLNESS:     Karly Mcgrath is a 76 y.o. female patient who returns today for follow up on her thrombocytopenia and anemia.  She is on a multivitamin with iron once daily.  She is also taking biotin due to thinning of her hair.  She reports improvement in her fatigue.  She is not having constipation from the iron in the multivitamin.  She is not having problems with bleeding or bruising.      ROS:  Pertinent ROS is in the HPI.     Past medical, surgical, social and family history were reviewed.     MEDICATIONS:    Current Outpatient Medications:   •  acyclovir (ZOVIRAX) 200 MG capsule, Take 1 capsule by mouth 5 (Five) Times a Day. (Patient taking differently: Take 200 mg by mouth 5 (Five) Times a Day As Needed (cold sores).), Disp: 25 capsule, Rfl: 2  •  amLODIPine (NORVASC) 2.5 MG tablet, TAKE ONE TABLET BY MOUTH DAILY, Disp: 30 tablet, Rfl: 4  •  atorvastatin (LIPITOR) 10 MG tablet, TAKE ONE TABLET BY MOUTH DAILY, Disp: 90 tablet, Rfl: 2  •  Biotin 5000 MCG tablet, Take 5,000 mcg by mouth Daily., Disp: , Rfl:   •  Calcium Carb-Cholecalciferol (CALCIUM 600 + D) 600-200 MG-UNIT tablet, Take 2,000 mg by mouth 2 (Two) Times a Day., Disp: , Rfl:   •  cholecalciferol (VITAMIN D3) 1000 units tablet, Take 2,000 Units by mouth Daily., Disp: , Rfl:   •  Cyanocobalamin (VITAMIN B 12 PO), Take 1,000 mcg by mouth 1 (One) Time Per Week., Disp: , Rfl:   •  hydroCHLOROthiazide (HYDRODIURIL) 12.5 MG tablet, TAKE ONE TABLET BY MOUTH DAILY, Disp: 30 tablet, Rfl: 2  •  levothyroxine (SYNTHROID, LEVOTHROID) 50 MCG tablet, Take every other day on even day, Disp: 45 tablet, Rfl: 2  •  levothyroxine (SYNTHROID, LEVOTHROID) 75 MCG tablet, TAKE ONE TABLET BY MOUTH DAILY, Disp: 90 tablet, Rfl: 0  •  losartan (COZAAR) 100 MG tablet, Take 1 tablet by mouth Daily., Disp: 90 tablet, Rfl: 2  •  metoprolol succinate  "XL (TOPROL-XL) 50 MG 24 hr tablet, TAKE ONE TABLET BY MOUTH DAILY, Disp: 90 tablet, Rfl: 1  •  Multiple Vitamins-Iron (One Daily Multivitamin/Iron) tablet, Take 1 tablet by mouth Daily., Disp: , Rfl:   •  omeprazole (priLOSEC) 40 MG capsule, Take 40 mg by mouth As Needed. Only takes if prednisone upsets stomach. Hasn't needed it so far.., Disp: , Rfl:   •  acyclovir (ZOVIRAX) 5 % ointment, Apply  topically to the appropriate area as directed 5 (Five) Times a Day As Needed (cold sore)., Disp: 15 g, Rfl: 1    Current Facility-Administered Medications:   •  cyanocobalamin injection 1,000 mcg, 1,000 mcg, Intramuscular, Weekly, Vito Adame MD, 1,000 mcg at 03/09/20 1041    Objective   VITAL SIGNS:     Vitals:    11/24/21 1052   BP: 162/78   Pulse: 67   Resp: 16   Temp: 96.9 °F (36.1 °C)   TempSrc: Temporal   SpO2: 98%   Weight: 63 kg (139 lb)   Height: 164.5 cm (64.76\")   PainSc: 0-No pain     Body mass index is 23.3 kg/m².     Wt Readings from Last 5 Encounters:   11/24/21 63 kg (139 lb)   10/29/21 62.8 kg (138 lb 8 oz)   09/29/21 62.3 kg (137 lb 6.4 oz)   08/04/21 61.4 kg (135 lb 6.4 oz)   06/09/21 63.1 kg (139 lb 1.6 oz)     PHYSICAL EXAMINATION:   GENERAL: The patient appears in good general condition, not in acute distress.   SKIN: No ecchymosis.  EYES: No jaundice.  LYMPHATICS: No cervical, supraclavicular or axillary lymphadenopathy.  ABDOMEN: Soft. No tenderness. No Hepatomegaly. No Splenomegaly. No masses.    DIAGNOSTIC DATA:     Results from last 7 days   Lab Units 11/24/21  1039   WBC 10*3/mm3 6.71   NEUTROS ABS 10*3/mm3 4.38   HEMOGLOBIN g/dL 13.1   HEMATOCRIT % 38.4   PLATELETS 10*3/mm3 159         Lab 11/24/21  1039   IRON 120   IRON SATURATION 37   TIBC 323   TRANSFERRIN 217   FERRITIN 103.50   VITAMIN B 12 499        Assessment/Plan   *Acute thrombocytopenia diagnosed on 9/30/2020.    · Patient has prior history of chronic thrombocytopenia.  Platelet count was 119,000 on 8/28/2019.   · Platelet count " decreased to 44,000 on 9/10/2020.   · Platelet count decreased to 11,000 on 9/30/2020.   · Flow cytometry on 9/30/2021 revealed no evidence of leukemia or lymphoma.  · Patient was started on prednisone 1 mg/kg daily on 9/30/2020.    · The dose of prednisone was slowly tapered.  · After the dose was reduced to 10 mg a day, platelets decreased to 124,000 on 12/23/2020.  · The dose was increased back to 20 mg a day on 12/23/2020.  · Platelet count improved 159,000 on 1/6/2021.  · The dose was slowly tapered afterwards. Prednisone was stopped on 3/3/2021.  · Platelets were normal at 157,000 on 6/9/2021.  · Platelet count is 159,000 today.    *Iron deficiency anemia.  · Hemoglobin was 11.1 on 9/10/2020.  · Patient was started on ferrous sulfate 325 mg daily on 9/30/2020.  · Hemoglobin improved to 14.8 on 1/6/2021. Iron stores improved.   · Ferrous sulfate changed to once weekly on 1/6/2021.  · Patient was tolerating the ferrous sulfate until April 2021 when she developed severe constipation and went to the ER. Ferrous sulfate was stopped.  · Hemoglobin decreased to 12.9 on 6/9/2021. Iron stores decreased.   · She was started on multivitamin with iron once daily on 6/9/2021.  · Hemoglobin is 13.1 today.  Iron stores improved with a multivitamin.    *Vitamin B12 deficiency.  · Patient was on B12 1000 mcg orally daily.  · B12 level became elevated at 1947 on 11/11/2020.  · B12 was reduced to once weekly on 10/14/2020.   · Vitamin B12 level decreased to 608 on 6/9/2021.  · Vitamin B12 once weekly was continued.  · Vitamin B12 level is 499 today.    PLAN:    1.  Continue multivitamin with iron once daily.  2.  Continue vitamin B-12 once weekly.  3.  Since the platelet count is stable, we will increase the interval between labs to 6 months.  She will have a CBC with RN review in 6 months.  I will see her in follow-up in 1 year with CBC ferritin iron panel and vitamin B12 level.  She will notify us sooner if she notices any  problem with easy bruising or if she develops petechiae.        Garcia Muro MD  11/24/21

## 2021-12-03 RX ORDER — LOSARTAN POTASSIUM 100 MG/1
TABLET ORAL
Qty: 90 TABLET | Refills: 1 | Status: SHIPPED | OUTPATIENT
Start: 2021-12-03 | End: 2022-06-01

## 2021-12-06 RX ORDER — AMLODIPINE BESYLATE 2.5 MG/1
TABLET ORAL
Qty: 30 TABLET | Refills: 5 | Status: SHIPPED | OUTPATIENT
Start: 2021-12-06 | End: 2022-06-06

## 2021-12-08 RX ORDER — HYDROCHLOROTHIAZIDE 12.5 MG/1
TABLET ORAL
Qty: 30 TABLET | Refills: 5 | Status: SHIPPED | OUTPATIENT
Start: 2021-12-08 | End: 2022-06-06

## 2021-12-09 ENCOUNTER — HOSPITAL ENCOUNTER (OUTPATIENT)
Dept: MAMMOGRAPHY | Facility: HOSPITAL | Age: 76
Discharge: HOME | End: 2021-12-09
Admitting: FAMILY MEDICINE

## 2021-12-09 DIAGNOSIS — Z12.31 ENCOUNTER FOR SCREENING MAMMOGRAM FOR MALIGNANT NEOPLASM OF BREAST: ICD-10-CM

## 2021-12-09 PROCEDURE — 77063 BREAST TOMOSYNTHESIS BI: CPT

## 2021-12-09 PROCEDURE — 77067 SCR MAMMO BI INCL CAD: CPT

## 2021-12-10 DIAGNOSIS — R92.8 ABNORMAL MAMMOGRAM OF LEFT BREAST: Primary | ICD-10-CM

## 2022-01-17 ENCOUNTER — HOSPITAL ENCOUNTER (OUTPATIENT)
Dept: MAMMOGRAPHY | Facility: HOSPITAL | Age: 77
Discharge: HOME | End: 2022-01-17

## 2022-01-17 ENCOUNTER — HOSPITAL ENCOUNTER (OUTPATIENT)
Dept: ULTRASOUND IMAGING | Facility: HOSPITAL | Age: 77
Discharge: HOME | End: 2022-01-17

## 2022-01-17 DIAGNOSIS — R92.8 ABNORMAL MAMMOGRAM OF LEFT BREAST: ICD-10-CM

## 2022-01-17 PROCEDURE — G0279 TOMOSYNTHESIS, MAMMO: HCPCS

## 2022-01-17 PROCEDURE — 76642 ULTRASOUND BREAST LIMITED: CPT

## 2022-01-17 PROCEDURE — 77065 DX MAMMO INCL CAD UNI: CPT

## 2022-02-08 DIAGNOSIS — E03.9 HYPOTHYROIDISM, UNSPECIFIED TYPE: ICD-10-CM

## 2022-02-08 RX ORDER — LEVOTHYROXINE SODIUM 0.07 MG/1
TABLET ORAL
Qty: 90 TABLET | Refills: 0 | OUTPATIENT
Start: 2022-02-08

## 2022-02-08 RX ORDER — LEVOTHYROXINE SODIUM 0.05 MG/1
TABLET ORAL
Qty: 90 TABLET | OUTPATIENT
Start: 2022-02-08

## 2022-02-08 RX ORDER — LEVOTHYROXINE SODIUM 0.07 MG/1
TABLET ORAL
Qty: 90 TABLET | Refills: 0 | Status: SHIPPED | OUTPATIENT
Start: 2022-02-08 | End: 2022-05-09

## 2022-03-23 PROCEDURE — 36415 COLL VENOUS BLD VENIPUNCTURE: CPT

## 2022-03-23 PROCEDURE — 93010 ELECTROCARDIOGRAM REPORT: CPT | Performed by: INTERNAL MEDICINE

## 2022-03-23 PROCEDURE — 99283 EMERGENCY DEPT VISIT LOW MDM: CPT

## 2022-03-23 PROCEDURE — 93005 ELECTROCARDIOGRAM TRACING: CPT

## 2022-03-23 PROCEDURE — 99284 EMERGENCY DEPT VISIT MOD MDM: CPT

## 2022-03-23 PROCEDURE — 93005 ELECTROCARDIOGRAM TRACING: CPT | Performed by: EMERGENCY MEDICINE

## 2022-03-24 ENCOUNTER — APPOINTMENT (OUTPATIENT)
Dept: GENERAL RADIOLOGY | Facility: HOSPITAL | Age: 77
End: 2022-03-24

## 2022-03-24 ENCOUNTER — HOSPITAL ENCOUNTER (EMERGENCY)
Facility: HOSPITAL | Age: 77
Discharge: HOME | End: 2022-03-24
Attending: EMERGENCY MEDICINE | Admitting: EMERGENCY MEDICINE

## 2022-03-24 VITALS
RESPIRATION RATE: 18 BRPM | HEART RATE: 67 BPM | OXYGEN SATURATION: 99 % | DIASTOLIC BLOOD PRESSURE: 70 MMHG | TEMPERATURE: 98.3 F | SYSTOLIC BLOOD PRESSURE: 143 MMHG

## 2022-03-24 DIAGNOSIS — K21.9 GASTROESOPHAGEAL REFLUX DISEASE WITHOUT ESOPHAGITIS: ICD-10-CM

## 2022-03-24 DIAGNOSIS — R07.81 PLEURITIC CHEST PAIN: ICD-10-CM

## 2022-03-24 DIAGNOSIS — J47.0 BRONCHIECTASIS WITH ACUTE LOWER RESPIRATORY INFECTION: ICD-10-CM

## 2022-03-24 DIAGNOSIS — R91.8 PULMONARY INFILTRATE IN LEFT LUNG ON CXR: Primary | ICD-10-CM

## 2022-03-24 DIAGNOSIS — I10 PRIMARY HYPERTENSION: ICD-10-CM

## 2022-03-24 DIAGNOSIS — E78.5 HYPERLIPIDEMIA, UNSPECIFIED HYPERLIPIDEMIA TYPE: ICD-10-CM

## 2022-03-24 LAB
ALBUMIN SERPL-MCNC: 4.2 G/DL (ref 3.5–5.2)
ALBUMIN/GLOB SERPL: 1.7 G/DL
ALP SERPL-CCNC: 105 U/L (ref 39–117)
ALT SERPL W P-5'-P-CCNC: 16 U/L (ref 1–33)
ANION GAP SERPL CALCULATED.3IONS-SCNC: 12.3 MMOL/L (ref 5–15)
AST SERPL-CCNC: 18 U/L (ref 1–32)
B PARAPERT DNA SPEC QL NAA+PROBE: NOT DETECTED
B PERT DNA SPEC QL NAA+PROBE: NOT DETECTED
BACTERIA UR QL AUTO: NORMAL /HPF
BASOPHILS # BLD AUTO: 0.03 10*3/MM3 (ref 0–0.2)
BASOPHILS NFR BLD AUTO: 0.4 % (ref 0–1.5)
BILIRUB SERPL-MCNC: 1 MG/DL (ref 0–1.2)
BILIRUB UR QL STRIP: NEGATIVE
BUN SERPL-MCNC: 11 MG/DL (ref 8–23)
BUN/CREAT SERPL: 15.3 (ref 7–25)
C PNEUM DNA NPH QL NAA+NON-PROBE: NOT DETECTED
CALCIUM SPEC-SCNC: 9.3 MG/DL (ref 8.6–10.5)
CHLORIDE SERPL-SCNC: 83 MMOL/L (ref 98–107)
CLARITY UR: CLEAR
CO2 SERPL-SCNC: 26.7 MMOL/L (ref 22–29)
COLOR UR: YELLOW
CREAT SERPL-MCNC: 0.72 MG/DL (ref 0.57–1)
D DIMER PPP FEU-MCNC: <0.27 MCGFEU/ML (ref 0–0.49)
D-LACTATE SERPL-SCNC: 0.9 MMOL/L (ref 0.5–2)
DEPRECATED RDW RBC AUTO: 38.1 FL (ref 37–54)
EGFRCR SERPLBLD CKD-EPI 2021: 86.2 ML/MIN/1.73
EOSINOPHIL # BLD AUTO: 0.1 10*3/MM3 (ref 0–0.4)
EOSINOPHIL NFR BLD AUTO: 1.4 % (ref 0.3–6.2)
ERYTHROCYTE [DISTWIDTH] IN BLOOD BY AUTOMATED COUNT: 12 % (ref 12.3–15.4)
FLUAV SUBTYP SPEC NAA+PROBE: NOT DETECTED
FLUBV RNA ISLT QL NAA+PROBE: NOT DETECTED
GLOBULIN UR ELPH-MCNC: 2.5 GM/DL
GLUCOSE SERPL-MCNC: 114 MG/DL (ref 65–99)
GLUCOSE UR STRIP-MCNC: NEGATIVE MG/DL
HADV DNA SPEC NAA+PROBE: NOT DETECTED
HCOV 229E RNA SPEC QL NAA+PROBE: NOT DETECTED
HCOV HKU1 RNA SPEC QL NAA+PROBE: NOT DETECTED
HCOV NL63 RNA SPEC QL NAA+PROBE: NOT DETECTED
HCOV OC43 RNA SPEC QL NAA+PROBE: NOT DETECTED
HCT VFR BLD AUTO: 37.7 % (ref 34–46.6)
HGB BLD-MCNC: 13.1 G/DL (ref 12–15.9)
HGB UR QL STRIP.AUTO: NEGATIVE
HMPV RNA NPH QL NAA+NON-PROBE: NOT DETECTED
HPIV1 RNA ISLT QL NAA+PROBE: NOT DETECTED
HPIV2 RNA SPEC QL NAA+PROBE: NOT DETECTED
HPIV3 RNA NPH QL NAA+PROBE: NOT DETECTED
HPIV4 P GENE NPH QL NAA+PROBE: NOT DETECTED
HYALINE CASTS UR QL AUTO: NORMAL /LPF
KETONES UR QL STRIP: NEGATIVE
LEUKOCYTE ESTERASE UR QL STRIP.AUTO: ABNORMAL
LYMPHOCYTES # BLD AUTO: 1.13 10*3/MM3 (ref 0.7–3.1)
LYMPHOCYTES NFR BLD AUTO: 15.4 % (ref 19.6–45.3)
M PNEUMO IGG SER IA-ACNC: NOT DETECTED
MAGNESIUM SERPL-MCNC: 1.8 MG/DL (ref 1.6–2.4)
MCH RBC QN AUTO: 30.7 PG (ref 26.6–33)
MCHC RBC AUTO-ENTMCNC: 34.7 G/DL (ref 31.5–35.7)
MCV RBC AUTO: 88.3 FL (ref 79–97)
MONOCYTES # BLD AUTO: 0.74 10*3/MM3 (ref 0.1–0.9)
MONOCYTES NFR BLD AUTO: 10.1 % (ref 5–12)
NEUTROPHILS NFR BLD AUTO: 5.29 10*3/MM3 (ref 1.7–7)
NEUTROPHILS NFR BLD AUTO: 72.2 % (ref 42.7–76)
NITRITE UR QL STRIP: NEGATIVE
PH UR STRIP.AUTO: 7.5 [PH] (ref 5–8)
PLATELET # BLD AUTO: 134 10*3/MM3 (ref 140–450)
PMV BLD AUTO: 11 FL (ref 6–12)
POTASSIUM SERPL-SCNC: 3.8 MMOL/L (ref 3.5–5.2)
PROCALCITONIN SERPL-MCNC: 0.06 NG/ML (ref 0–0.25)
PROT SERPL-MCNC: 6.7 G/DL (ref 6–8.5)
PROT UR QL STRIP: NEGATIVE
QT INTERVAL: 386 MS
RBC # BLD AUTO: 4.27 10*6/MM3 (ref 3.77–5.28)
RBC # UR STRIP: NORMAL /HPF
REF LAB TEST METHOD: NORMAL
RHINOVIRUS RNA SPEC NAA+PROBE: NOT DETECTED
RSV RNA NPH QL NAA+NON-PROBE: NOT DETECTED
SARS-COV-2 RNA NPH QL NAA+NON-PROBE: NOT DETECTED
SODIUM SERPL-SCNC: 122 MMOL/L (ref 136–145)
SP GR UR STRIP: 1.01 (ref 1–1.03)
SQUAMOUS #/AREA URNS HPF: NORMAL /HPF
TROPONIN T SERPL-MCNC: <0.01 NG/ML (ref 0–0.03)
TROPONIN T SERPL-MCNC: <0.01 NG/ML (ref 0–0.03)
UROBILINOGEN UR QL STRIP: ABNORMAL
WBC # UR STRIP: NORMAL /HPF
WBC NRBC COR # BLD: 7.33 10*3/MM3 (ref 3.4–10.8)

## 2022-03-24 PROCEDURE — 85379 FIBRIN DEGRADATION QUANT: CPT | Performed by: PHYSICIAN ASSISTANT

## 2022-03-24 PROCEDURE — 84484 ASSAY OF TROPONIN QUANT: CPT | Performed by: PHYSICIAN ASSISTANT

## 2022-03-24 PROCEDURE — 0202U NFCT DS 22 TRGT SARS-COV-2: CPT | Performed by: PHYSICIAN ASSISTANT

## 2022-03-24 PROCEDURE — 80053 COMPREHEN METABOLIC PANEL: CPT | Performed by: PHYSICIAN ASSISTANT

## 2022-03-24 PROCEDURE — 83605 ASSAY OF LACTIC ACID: CPT | Performed by: PHYSICIAN ASSISTANT

## 2022-03-24 PROCEDURE — 81001 URINALYSIS AUTO W/SCOPE: CPT | Performed by: PHYSICIAN ASSISTANT

## 2022-03-24 PROCEDURE — 85025 COMPLETE CBC W/AUTO DIFF WBC: CPT | Performed by: PHYSICIAN ASSISTANT

## 2022-03-24 PROCEDURE — 83735 ASSAY OF MAGNESIUM: CPT | Performed by: PHYSICIAN ASSISTANT

## 2022-03-24 PROCEDURE — 87040 BLOOD CULTURE FOR BACTERIA: CPT | Performed by: PHYSICIAN ASSISTANT

## 2022-03-24 PROCEDURE — 71045 X-RAY EXAM CHEST 1 VIEW: CPT

## 2022-03-24 PROCEDURE — 84145 PROCALCITONIN (PCT): CPT | Performed by: PHYSICIAN ASSISTANT

## 2022-03-24 RX ORDER — SODIUM CHLORIDE 0.9 % (FLUSH) 0.9 %
10 SYRINGE (ML) INJECTION AS NEEDED
Status: DISCONTINUED | OUTPATIENT
Start: 2022-03-24 | End: 2022-03-24 | Stop reason: HOSPADM

## 2022-03-24 RX ORDER — DOXYCYCLINE 100 MG/1
100 CAPSULE ORAL 2 TIMES DAILY
Qty: 14 CAPSULE | Refills: 0 | Status: SHIPPED | OUTPATIENT
Start: 2022-03-24 | End: 2022-04-16 | Stop reason: SDUPTHER

## 2022-03-24 RX ADMIN — SODIUM CHLORIDE 500 ML: 9 INJECTION, SOLUTION INTRAVENOUS at 01:30

## 2022-03-24 NOTE — ED PROVIDER NOTES
MD ATTESTATION NOTE    The KHANH and I have discussed this patient's history, physical exam, and treatment plan.  I have reviewed the documentation and personally had a face to face interaction with the patient. I affirm the documentation and agree with the treatment and plan.  The attached note describes my personal findings.      I provided a substantive portion of the care of the patient.  I personally performed the physical exam in its entirety, and below are my findings.  For this patient encounter, the patient wore surgical mask, I wore full protective PPE including N95 and eye protection.      Brief HPI: Patient complains of shortness of breath and sharp left-sided chest pain that began several hours ago.  She also felt shaky.  She has a chronic productive cough which is unchanged.  Denies fever, abdominal pain, vomiting, diarrhea, or sick contacts.    PHYSICAL EXAM  ED Triage Vitals [03/23/22 2337]   Temp Heart Rate Resp BP SpO2   98 °F (36.7 °C) 83 19 169/77 98 %      Temp src Heart Rate Source Patient Position BP Location FiO2 (%)   -- -- -- -- --         GENERAL: Awake, alert, oriented x3.  Nontoxic-appearing   HENT: nares patent  EYES: no scleral icterus  CV: regular rhythm, normal rate  RESPIRATORY: normal effort, clear to auscultation bilaterally  ABDOMEN: soft, nontender  MUSCULOSKELETAL: no deformity, no calf tenderness  NEURO: alert, moves all extremities, follows commands  PSYCH:  calm, cooperative  SKIN: warm, dry    Vital signs and nursing notes reviewed.    Plan: Chest x-ray shows increased density in the left lung.  White blood cell count, lactic acid, and procalcitonin are normal.  Patient is hyponatremic.  D-dimer and respiratory panel are pending.  She is not hypoxic, tachypneic, or tachycardic.  Patient will be given IV fluids.  I personally reviewed the patient's EKG and agree with Favian Garcia's interpretation         Zeus Denise MD  03/24/22 6957

## 2022-03-24 NOTE — DISCHARGE INSTRUCTIONS
Home, rest, medicine as directed, keep well-hydrated.  Home medicine as prescribed, follow up with your pulmonologist and PCP for recheck. Return to care if symptoms worsen or with further concerns.

## 2022-03-24 NOTE — ED TRIAGE NOTES
Patient from home via private vehicle reporting left sided chest discomfort and shortness of breath that started an hour prior to arrival. States the chest pain is worse with inspiration.

## 2022-03-24 NOTE — ED PROVIDER NOTES
" EMERGENCY DEPARTMENT ENCOUNTER    Room Number:  08/08  Date of encounter:  3/24/2022  PCP: Vito Adame MD  Historian: Patient      HPI:  Chief Complaint: Chest pain   A complete HPI/ROS/PMH/PSH/SH/FH are unobtainable due to: N/A    Context: Karly Mcgrath is a 76 y.o. female with past medical history of hypertension, hyperlipidemia, hypothyroidism, bronchiectasis, and anxiety who presents to the ED c/o \"I do not feel good\".  Patient states that around 9:00 this evening she started to feel ill with the shakes, dizziness, some shortness of breath, sharp left-sided chest pain that was worse with a deep breath, and runny nose.  Patient denies any measured fever, sore throat, headache, no chills, no nausea, vomiting, diarrhea, no abdominal pain, or UTI symptoms.  Patient states that her symptoms are similar to when she had pneumonia.      PAST MEDICAL HISTORY  Active Ambulatory Problems     Diagnosis Date Noted   • Anxiety 04/17/2016   • Bronchiectasis without complication (HCC) 04/17/2016   • Hyperlipidemia 04/17/2016   • Hypertension 04/17/2016   • Osteopenia 04/17/2016   • Hypothyroidism 04/17/2016   • Health care maintenance 04/17/2016   • Herpes simplex type 1 infection 02/23/2018   • Urethritis 07/16/2018   • Hypovitaminosis D 11/19/2018   • Medicare annual wellness visit, initial 06/03/2019   • Menopausal and postmenopausal disorder 06/03/2019   • Vegetarian 12/11/2019   • Thrombocytopenia (HCC) 03/09/2020   • Medicare annual wellness visit, subsequent 09/10/2020   • Anemia due to vitamin B12 deficiency 09/10/2020   • Immune thrombocytopenic purpura (HCC) 10/14/2020   • Other constipation 04/29/2021   • Encounter for screening mammogram for malignant neoplasm of breast 10/29/2021     Resolved Ambulatory Problems     Diagnosis Date Noted   • Acute non-recurrent maxillary sinusitis 02/22/2019   • Pneumonia 09/09/2019     Past Medical History:   Diagnosis Date   • Basal cell carcinoma    • Disease of thyroid " gland    • Maxillary sinusitis 10/08/2015         PAST SURGICAL HISTORY  Past Surgical History:   Procedure Laterality Date   • APPENDECTOMY     • BRONCHOSCOPY      6-7 YEARS AGO   • BRONCHOSCOPY N/A 9/18/2018    Procedure: BRONCHOSCOPY WITH BAL;  Surgeon: Josué Espinosa MD;  Location: [facility] ENDOSCOPY;  Service: Pulmonary   • COLONOSCOPY     • TUBAL ABDOMINAL LIGATION     • WRIST SURGERY           FAMILY HISTORY  Family History   Problem Relation Age of Onset   • Heart attack Mother         Acute Myocardial Infarction   • Heart failure Mother         Congestive Heart Failure   • Hypertension Mother    • Coronary artery disease Brother         CABG   • Heart attack Father    • Breast cancer Daughter          SOCIAL HISTORY  Social History     Socioeconomic History   • Marital status:      Spouse name: Don   Tobacco Use   • Smoking status: Never Smoker   • Smokeless tobacco: Never Used   Substance and Sexual Activity   • Alcohol use: No   • Drug use: No   • Sexual activity: Defer         ALLERGIES  Eggs or egg-derived products and Sulfa antibiotics        REVIEW OF SYSTEMS  Review of Systems     All systems reviewed and negative except for those discussed in HPI.       PHYSICAL EXAM    I have reviewed the triage vital signs and nursing notes.    ED Triage Vitals [03/23/22 2337]   Temp Heart Rate Resp BP SpO2   98 °F (36.7 °C) 83 19 169/77 98 %      Temp src Heart Rate Source Patient Position BP Location FiO2 (%)   -- -- -- -- --       Physical Exam  GENERAL: Alert and oriented x3, anxious, not distressed  HENT: no pharyngeal erythema or tonsillar exudate, moist mucous membranes  EYES: no scleral icterus, PERRL, EOMI  CV: regular rhythm, regular rate  RESPIRATORY: normal effort, clear to auscultate bilaterally  ABDOMEN: soft/nontender  MUSCULOSKELETAL: no deformity  NEURO: alert, moves all extremities, follows commands  SKIN: warm, dry, no rash        LAB RESULTS  Recent Results (from the past 24  hour(s))   ECG 12 Lead    Collection Time: 03/23/22 11:32 PM   Result Value Ref Range    QT Interval 386 ms   Comprehensive Metabolic Panel    Collection Time: 03/24/22 12:33 AM    Specimen: Blood   Result Value Ref Range    Glucose 114 (H) 65 - 99 mg/dL    BUN 11 8 - 23 mg/dL    Creatinine 0.72 0.57 - 1.00 mg/dL    Sodium 122 (L) 136 - 145 mmol/L    Potassium 3.8 3.5 - 5.2 mmol/L    Chloride 83 (L) 98 - 107 mmol/L    CO2 26.7 22.0 - 29.0 mmol/L    Calcium 9.3 8.6 - 10.5 mg/dL    Total Protein 6.7 6.0 - 8.5 g/dL    Albumin 4.20 3.50 - 5.20 g/dL    ALT (SGPT) 16 1 - 33 U/L    AST (SGOT) 18 1 - 32 U/L    Alkaline Phosphatase 105 39 - 117 U/L    Total Bilirubin 1.0 0.0 - 1.2 mg/dL    Globulin 2.5 gm/dL    A/G Ratio 1.7 g/dL    BUN/Creatinine Ratio 15.3 7.0 - 25.0    Anion Gap 12.3 5.0 - 15.0 mmol/L    eGFR 86.2 >60.0 mL/min/1.73   D-dimer, Quantitative    Collection Time: 03/24/22 12:33 AM    Specimen: Blood   Result Value Ref Range    D-Dimer, Quantitative <0.27 0.00 - 0.49 MCGFEU/mL   Troponin    Collection Time: 03/24/22 12:33 AM    Specimen: Blood   Result Value Ref Range    Troponin T <0.010 0.000 - 0.030 ng/mL   Lactic Acid, Plasma    Collection Time: 03/24/22 12:33 AM    Specimen: Blood   Result Value Ref Range    Lactate 0.9 0.5 - 2.0 mmol/L   Procalcitonin    Collection Time: 03/24/22 12:33 AM    Specimen: Blood   Result Value Ref Range    Procalcitonin 0.06 0.00 - 0.25 ng/mL   Magnesium    Collection Time: 03/24/22 12:33 AM    Specimen: Blood   Result Value Ref Range    Magnesium 1.8 1.6 - 2.4 mg/dL   CBC Auto Differential    Collection Time: 03/24/22 12:33 AM    Specimen: Blood   Result Value Ref Range    WBC 7.33 3.40 - 10.80 10*3/mm3    RBC 4.27 3.77 - 5.28 10*6/mm3    Hemoglobin 13.1 12.0 - 15.9 g/dL    Hematocrit 37.7 34.0 - 46.6 %    MCV 88.3 79.0 - 97.0 fL    MCH 30.7 26.6 - 33.0 pg    MCHC 34.7 31.5 - 35.7 g/dL    RDW 12.0 (L) 12.3 - 15.4 %    RDW-SD 38.1 37.0 - 54.0 fl    MPV 11.0 6.0 - 12.0 fL     Platelets 134 (L) 140 - 450 10*3/mm3    Neutrophil % 72.2 42.7 - 76.0 %    Lymphocyte % 15.4 (L) 19.6 - 45.3 %    Monocyte % 10.1 5.0 - 12.0 %    Eosinophil % 1.4 0.3 - 6.2 %    Basophil % 0.4 0.0 - 1.5 %    Neutrophils, Absolute 5.29 1.70 - 7.00 10*3/mm3    Lymphocytes, Absolute 1.13 0.70 - 3.10 10*3/mm3    Monocytes, Absolute 0.74 0.10 - 0.90 10*3/mm3    Eosinophils, Absolute 0.10 0.00 - 0.40 10*3/mm3    Basophils, Absolute 0.03 0.00 - 0.20 10*3/mm3   Respiratory Panel PCR w/COVID-19(SARS-CoV-2) [facility] In-House, NP Swab in [facility], 3-4 HR TAT - Swab, Nasopharynx    Collection Time: 03/24/22 12:35 AM    Specimen: Nasopharynx; Swab   Result Value Ref Range    ADENOVIRUS, PCR Not Detected Not Detected    Coronavirus 229E Not Detected Not Detected    Coronavirus HKU1 Not Detected Not Detected    Coronavirus NL63 Not Detected Not Detected    Coronavirus OC43 Not Detected Not Detected    COVID19 Not Detected Not Detected - Ref. Range    Human Metapneumovirus Not Detected Not Detected    Human Rhinovirus/Enterovirus Not Detected Not Detected    Influenza A PCR Not Detected Not Detected    Influenza B PCR Not Detected Not Detected    Parainfluenza Virus 1 Not Detected Not Detected    Parainfluenza Virus 2 Not Detected Not Detected    Parainfluenza Virus 3 Not Detected Not Detected    Parainfluenza Virus 4 Not Detected Not Detected    RSV, PCR Not Detected Not Detected    Bordetella pertussis pcr Not Detected Not Detected    Bordetella parapertussis PCR Not Detected Not Detected    Chlamydophila pneumoniae PCR Not Detected Not Detected    Mycoplasma pneumo by PCR Not Detected Not Detected   Urinalysis With Microscopic If Indicated (No Culture) - Urine, Clean Catch    Collection Time: 03/24/22  1:16 AM    Specimen: Urine, Clean Catch   Result Value Ref Range    Color, UA Yellow Yellow, Straw    Appearance, UA Clear Clear    pH, UA 7.5 5.0 - 8.0    Specific Gravity, UA 1.006 1.005 - 1.030    Glucose, UA  Negative Negative    Ketones, UA Negative Negative    Bilirubin, UA Negative Negative    Blood, UA Negative Negative    Protein, UA Negative Negative    Leuk Esterase, UA Trace (A) Negative    Nitrite, UA Negative Negative    Urobilinogen, UA 0.2 E.U./dL 0.2 - 1.0 E.U./dL   Urinalysis, Microscopic Only - Urine, Clean Catch    Collection Time: 03/24/22  1:16 AM    Specimen: Urine, Clean Catch   Result Value Ref Range    RBC, UA 0-2 None Seen, 0-2 /HPF    WBC, UA 0-2 None Seen, 0-2 /HPF    Bacteria, UA None Seen None Seen /HPF    Squamous Epithelial Cells, UA 0-2 None Seen, 0-2 /HPF    Hyaline Casts, UA None Seen None Seen /LPF    Methodology Automated Microscopy    Troponin    Collection Time: 03/24/22  2:29 AM    Specimen: Blood   Result Value Ref Range    Troponin T <0.010 0.000 - 0.030 ng/mL       Ordered the above labs and independently reviewed the results.        RADIOLOGY  XR Chest 1 View    Result Date: 3/24/2022  SINGLE VIEW OF THE CHEST  HISTORY: Shortness of air chest tightness  COMPARISON: 09/23/2019  FINDINGS: Heart size is within normal limits. No pneumothorax or pleural effusion is seen. There may be some patchy infiltrate within the left midlung. The patient has a known history of bronchiectasis involving the lingula, and this may be accounting for the apparent consolidation. However, correlation with any evidence of infection is recommended. Chronic scarring is seen within the right upper lobe.      There is some increased density identified within the left lung. Patient has a known history of bronchiectasis within this area, and this may be related to this process. However, correlation with any evidence of infection is recommended.  This report was finalized on 3/24/2022 1:08 AM by Dr. Jerri Vizcaino M.D.        I ordered the above noted radiological studies. Reviewed by me and discussed with radiologist.  See dictation for official radiology  interpretation.      PROCEDURES    Procedures      MEDICATIONS GIVEN IN ER    Medications   sodium chloride 0.9 % flush 10 mL (has no administration in time range)   sodium chloride 0.9 % bolus 500 mL (0 mL Intravenous Stopped 3/24/22 0313)         PROGRESS, DATA ANALYSIS, CONSULTS, AND MEDICAL DECISION MAKING    All labs have been independently reviewed by me.  All radiology studies have been reviewed by me and discussed with radiologist dictating the report.   EKG's independently viewed and interpreted by me.  Discussion below represents my analysis of pertinent findings related to patient's condition, differential diagnosis, treatment plan and final disposition.    DDx: Includes but not limited to pneumonia, Covid, influenza, bronchitis, COPD exacerbation, ACS, NSTEMI      ED Course as of 03/24/22 0415   Thu Mar 24, 2022   0011 EKG          EKG time: 23:32  Rhythm/Rate: Sinus rhythm at 87 bpm with PVC present  P waves and MS: Prolonged MS interval  QRS, axis: Right axis deviation  ST and T waves: No acute ST/T wave changes    Interpreted Contemporaneously by me, independently viewed  Not significantly changed compared to prior EKG of 1/9/2018.   [CARLO]   0116 WBC: 7.33 [CARLO]   0116 Hemoglobin: 13.1 [CARLO]   0116 Hematocrit: 37.7 [CARLO]   0116 Platelets(!): 134 [CARLO]   0116 Glucose(!): 114 [CARLO]   0116 BUN: 11 [CARLO]   0116 Creatinine: 0.72 [CARLO]   0116 Sodium(!): 122 [CARLO]   0116 Potassium: 3.8 [CARLO]   0116 Magnesium: 1.8 [CARLO]   0116 Chloride(!): 83 [CARLO]   0116 CO2: 26.7 [CARLO]   0130 Lactate: 0.9 [CARLO]   0130 Procalcitonin: 0.06 [CARLO]   0130 Troponin T: <0.010 [CARLO]   0221 D-Dimer, Quant: <0.27 [CARLO]   0256 Troponin T: <0.010 [CARLO]   0259 Patient history, presentation, and ER evaluation discussed with Dr. Sena, pulmonology, he is okay with outpatient antibiotics and follow-up with Dr. MURILLO [CARLO]   0318 Patient rechecked, sitting in bed, no acute distress.  Discussed results, diagnosis, and treatment plan.  Patient expressed  understanding and agrees with plan. [CARLO]      ED Course User Index  [CARLO] Favian Garcia PA       MDM: Patient's cardiac evaluation is normal including a nonischemic EKG, 2 normal troponins, and a negative D-dimer.  Patient's chest x-ray shows an area of possible pneumonia in the left lung.  Her vital signs are stable and she is not hypoxic and we will therefore treat with outpatient antibiotics and pulmonology follow-up.    PPE: The patient wore a surgical mask throughout the entire patient encounter. I wore an N95.    AS OF 04:15 EDT VITALS:    BP - 143/70  HR - 67  TEMP - 98.3 °F (36.8 °C)  O2 SATS - 99%        DIAGNOSIS  Final diagnoses:   Pulmonary infiltrate in left lung on CXR   Bronchiectasis with acute lower respiratory infection (HCC)   Pleuritic chest pain   Primary hypertension   Hyperlipidemia, unspecified hyperlipidemia type   Gastroesophageal reflux disease without esophagitis         DISPOSITION  DISCHARGE    Patient discharged in stable condition.    Reviewed implications of results, diagnosis, meds, responsibility to follow up, warning signs and symptoms of possible worsening, potential complications and reasons to return to ER.    Patient/Family voiced understanding of above instructions.    Discussed plan for discharge, as there is no emergent indication for admission. Patient referred to primary care provider for BP management due to today's BP. Pt/family is agreeable and understands need for follow up and repeat testing.  Pt is aware that discharge does not mean that nothing is wrong but it indicates no emergency is present that requires admission and they must continue care with follow-up as given below or physician of their choice.     FOLLOW-UP  Vito Adame MD  [address] [facility] [address]  [facility] [address] [phone]    Schedule an appointment as soon as possible for a visit       Josué Espinosa MD  [address] [facility] [address]  [facility]  [id number] [phone]    Schedule an appointment as soon as possible for a visit            Medication List      New Prescriptions    doxycycline 100 MG capsule  Commonly known as: MONODOX  Take 1 capsule by mouth 2 (Two) Times a Day.        Changed    acyclovir 200 MG capsule  Commonly known as: ZOVIRAX  Take 1 capsule by mouth 5 (Five) Times a Day.  What changed:   · when to take this  · reasons to take this           Where to Get Your Medications      These medications were sent to [facility] [address] [facility] AT [facility] & [facility] - [phone]  - [phone]   [address] [facility] [address]    Phone: [phone]   · doxycycline 100 MG capsule                    Favian Garcia PA  03/24/22 0415

## 2022-03-29 LAB
BACTERIA SPEC AEROBE CULT: NORMAL
BACTERIA SPEC AEROBE CULT: NORMAL

## 2022-04-13 ENCOUNTER — TELEPHONE (OUTPATIENT)
Dept: INTERNAL MEDICINE | Facility: CLINIC | Age: 77
End: 2022-04-13

## 2022-04-13 NOTE — TELEPHONE ENCOUNTER
PATIENT IS COUGHING SO MUCH COULD NOT SLEEP LAST NIGHT AND HAS A SORE THROAT. IS THERE ANYTHING YOU COULD CALL IN FOR HER.    PLEASE ADVISE  [phone]

## 2022-04-13 NOTE — TELEPHONE ENCOUNTER
Patient notified and expressed understanding.  If this does not help then she is to schedule an appointment.

## 2022-04-28 ENCOUNTER — OFFICE VISIT (OUTPATIENT)
Dept: INTERNAL MEDICINE | Facility: CLINIC | Age: 77
End: 2022-04-28

## 2022-04-28 VITALS
WEIGHT: 138.7 LBS | HEIGHT: 60 IN | DIASTOLIC BLOOD PRESSURE: 68 MMHG | BODY MASS INDEX: 27.23 KG/M2 | SYSTOLIC BLOOD PRESSURE: 134 MMHG | OXYGEN SATURATION: 96 % | HEART RATE: 74 BPM

## 2022-04-28 DIAGNOSIS — E78.2 MIXED HYPERLIPIDEMIA: ICD-10-CM

## 2022-04-28 DIAGNOSIS — E03.9 HYPOTHYROIDISM, UNSPECIFIED TYPE: ICD-10-CM

## 2022-04-28 DIAGNOSIS — I10 PRIMARY HYPERTENSION: Primary | ICD-10-CM

## 2022-04-28 DIAGNOSIS — E55.9 HYPOVITAMINOSIS D: ICD-10-CM

## 2022-04-28 PROBLEM — E87.1 HYPONATREMIA: Status: ACTIVE | Noted: 2022-04-28

## 2022-04-28 PROCEDURE — 99214 OFFICE O/P EST MOD 30 MIN: CPT | Performed by: FAMILY MEDICINE

## 2022-04-28 NOTE — PROGRESS NOTES
"Chief Complaint  follow up to hypertension and follow up to hyperlipidemia    Subjective          Karly Mcgrath presents to [facility]  History of Present Illness  Patient follows up for ongoing management of chronic problems of hypertension hyperlipidemia hypothyroidism she has had recent exacerbation of bronchiectasis as well as pneumonia treated with doxycycline had period of hyponatremia she feels well she has no acute concerns she is breathing well she has no pain she has some fluctuating blood pressure readings  Hydrochlorothiazide is to be taken every other day  Objective   Vital Signs:   /68 (BP Location: Right arm, Patient Position: Sitting, Cuff Size: Adult)   Pulse 74   Ht 152.4 cm (60\")   Wt 62.9 kg (138 lb 11.2 oz)   SpO2 96%   BMI 27.09 kg/m²     Physical Exam  Vitals and nursing note reviewed.   Constitutional:       Appearance: Normal appearance.   HENT:      Head: Normocephalic and atraumatic.   Eyes:      General: No scleral icterus.  Cardiovascular:      Rate and Rhythm: Normal rate.      Pulses: Normal pulses.      Heart sounds: Normal heart sounds.   Pulmonary:      Effort: Pulmonary effort is normal.      Breath sounds: Rhonchi present.   Abdominal:      Tenderness: There is no right CVA tenderness or left CVA tenderness.   Musculoskeletal:      Right lower leg: Edema present.      Left lower leg: Edema present.      Comments: Trace   Neurological:      Mental Status: She is alert.   Psychiatric:         Mood and Affect: Mood normal.         Behavior: Behavior normal.         Thought Content: Thought content normal.         Judgment: Judgment normal.        Result Review :     Common labs    Common Labsle 9/29/21 11/24/21 3/24/22 3/24/22      0033 0033   Glucose    114 (A)   BUN    11   Creatinine    0.72   Sodium    122 (A)   Potassium    3.8   Chloride    83 (A)   Calcium    9.3   Albumin    4.20   Total Bilirubin    1.0   Alkaline Phosphatase    105 "   AST (SGOT)    18   ALT (SGPT)    16   WBC 6.69 6.71 7.33    Hemoglobin 14.3 13.1 13.1    Hematocrit 41.1 38.4 37.7    Platelets 151 159 134 (A)    (A) Abnormal value                      Assessment and Plan    Diagnoses and all orders for this visit:    1. Primary hypertension (Primary)  -     Comprehensive Metabolic Panel    2. Mixed hyperlipidemia  -     Lipid Panel    3. Hypovitaminosis D  -     Vitamin D 25 Hydroxy    4. Hypothyroidism, unspecified type  -     TSH  -     T4, Free    Hypertension continue amlodipine hydrochlorothiazide losartan metoprolol  Pain from using anti-inflammatories with history of GERD and on Prilosec recommend intermittent use of acetaminophen for transient pain  Follow-up results of blood work for further management chronic problems otherwise as needed or           Follow Up   Return in about 6 months (around 10/28/2022), or if symptoms worsen or fail to improve, for Recheck.  Patient was given instructions and counseling regarding her condition or for health maintenance advice. Please see specific information pulled into the AVS if appropriate.

## 2022-05-09 RX ORDER — METOPROLOL SUCCINATE 50 MG/1
TABLET, EXTENDED RELEASE ORAL
Qty: 90 TABLET | Refills: 1 | Status: SHIPPED | OUTPATIENT
Start: 2022-05-09 | End: 2022-11-02

## 2022-05-09 RX ORDER — LEVOTHYROXINE SODIUM 0.07 MG/1
TABLET ORAL
Qty: 90 TABLET | Refills: 0 | Status: SHIPPED | OUTPATIENT
Start: 2022-05-09 | End: 2022-08-08

## 2022-05-11 ENCOUNTER — APPOINTMENT (OUTPATIENT)
Dept: OTHER | Facility: HOSPITAL | Age: 77
End: 2022-05-11

## 2022-05-11 ENCOUNTER — APPOINTMENT (OUTPATIENT)
Dept: ONCOLOGY | Facility: HOSPITAL | Age: 77
End: 2022-05-11

## 2022-05-12 ENCOUNTER — LAB (OUTPATIENT)
Dept: OTHER | Facility: HOSPITAL | Age: 77
End: 2022-05-12

## 2022-05-12 ENCOUNTER — CLINICAL SUPPORT (OUTPATIENT)
Dept: ONCOLOGY | Facility: HOSPITAL | Age: 77
End: 2022-05-12

## 2022-05-12 VITALS
HEART RATE: 65 BPM | RESPIRATION RATE: 18 BRPM | SYSTOLIC BLOOD PRESSURE: 164 MMHG | HEIGHT: 65 IN | BODY MASS INDEX: 22.66 KG/M2 | OXYGEN SATURATION: 97 % | TEMPERATURE: 97.4 F | DIASTOLIC BLOOD PRESSURE: 88 MMHG | WEIGHT: 136 LBS

## 2022-05-12 DIAGNOSIS — D50.9 IRON DEFICIENCY ANEMIA, UNSPECIFIED IRON DEFICIENCY ANEMIA TYPE: ICD-10-CM

## 2022-05-12 DIAGNOSIS — D69.3 IMMUNE THROMBOCYTOPENIC PURPURA: ICD-10-CM

## 2022-05-12 DIAGNOSIS — D51.3 OTHER DIETARY VITAMIN B12 DEFICIENCY ANEMIA: ICD-10-CM

## 2022-05-12 LAB
BASOPHILS # BLD AUTO: 0.05 10*3/MM3 (ref 0–0.2)
BASOPHILS NFR BLD AUTO: 0.8 % (ref 0–1.5)
DEPRECATED RDW RBC AUTO: 39.2 FL (ref 37–54)
EOSINOPHIL # BLD AUTO: 0.18 10*3/MM3 (ref 0–0.4)
EOSINOPHIL NFR BLD AUTO: 2.9 % (ref 0.3–6.2)
ERYTHROCYTE [DISTWIDTH] IN BLOOD BY AUTOMATED COUNT: 12.5 % (ref 12.3–15.4)
HCT VFR BLD AUTO: 37.7 % (ref 34–46.6)
HGB BLD-MCNC: 13.5 G/DL (ref 12–15.9)
IMM GRANULOCYTES # BLD AUTO: 0.09 10*3/MM3 (ref 0–0.05)
IMM GRANULOCYTES NFR BLD AUTO: 1.4 % (ref 0–0.5)
LYMPHOCYTES # BLD AUTO: 1.26 10*3/MM3 (ref 0.7–3.1)
LYMPHOCYTES NFR BLD AUTO: 20.1 % (ref 19.6–45.3)
MCH RBC QN AUTO: 30.7 PG (ref 26.6–33)
MCHC RBC AUTO-ENTMCNC: 35.8 G/DL (ref 31.5–35.7)
MCV RBC AUTO: 85.7 FL (ref 79–97)
MONOCYTES # BLD AUTO: 0.68 10*3/MM3 (ref 0.1–0.9)
MONOCYTES NFR BLD AUTO: 10.8 % (ref 5–12)
NEUTROPHILS NFR BLD AUTO: 4.01 10*3/MM3 (ref 1.7–7)
NEUTROPHILS NFR BLD AUTO: 64 % (ref 42.7–76)
NRBC BLD AUTO-RTO: 0 /100 WBC (ref 0–0.2)
PLATELET # BLD AUTO: 139 10*3/MM3 (ref 140–450)
PMV BLD AUTO: 12.3 FL (ref 6–12)
RBC # BLD AUTO: 4.4 10*6/MM3 (ref 3.77–5.28)
WBC NRBC COR # BLD: 6.27 10*3/MM3 (ref 3.4–10.8)

## 2022-05-12 PROCEDURE — G0463 HOSPITAL OUTPT CLINIC VISIT: HCPCS

## 2022-05-12 PROCEDURE — 85025 COMPLETE CBC W/AUTO DIFF WBC: CPT | Performed by: INTERNAL MEDICINE

## 2022-05-12 PROCEDURE — 36415 COLL VENOUS BLD VENIPUNCTURE: CPT

## 2022-05-12 NOTE — NURSING NOTE
Pt is here for lab with RN review.  CBC reviewed with pt, counts are stable for this pt at this time. Pt has no complaints.  Copy of labs given to pt and f/u appt reviewed. Pt is instructed to call the office with any concerns or new symptoms prior to next visit. Pt vu and discharge in stable condition.  Lab Results   Component Value Date    WBC 6.27 05/12/2022    HGB 13.5 05/12/2022    HCT 37.7 05/12/2022    MCV 85.7 05/12/2022     (L) 05/12/2022

## 2022-06-01 RX ORDER — LOSARTAN POTASSIUM 100 MG/1
TABLET ORAL
Qty: 90 TABLET | Refills: 1 | Status: SHIPPED | OUTPATIENT
Start: 2022-06-01 | End: 2022-10-31

## 2022-06-06 RX ORDER — AMLODIPINE BESYLATE 2.5 MG/1
TABLET ORAL
Qty: 30 TABLET | Refills: 5 | Status: SHIPPED | OUTPATIENT
Start: 2022-06-06 | End: 2022-12-01

## 2022-06-06 RX ORDER — HYDROCHLOROTHIAZIDE 12.5 MG/1
TABLET ORAL
Qty: 30 TABLET | Refills: 5 | Status: SHIPPED | OUTPATIENT
Start: 2022-06-06 | End: 2022-12-01

## 2022-06-30 ENCOUNTER — OFFICE VISIT (OUTPATIENT)
Dept: INTERNAL MEDICINE | Facility: CLINIC | Age: 77
End: 2022-06-30

## 2022-06-30 VITALS
TEMPERATURE: 97.3 F | OXYGEN SATURATION: 97 % | SYSTOLIC BLOOD PRESSURE: 136 MMHG | DIASTOLIC BLOOD PRESSURE: 82 MMHG | HEART RATE: 70 BPM | BODY MASS INDEX: 22.79 KG/M2 | WEIGHT: 136.8 LBS | HEIGHT: 65 IN

## 2022-06-30 DIAGNOSIS — J01.00 ACUTE MAXILLARY SINUSITIS, RECURRENCE NOT SPECIFIED: Primary | ICD-10-CM

## 2022-06-30 DIAGNOSIS — J47.9 BRONCHIECTASIS WITHOUT COMPLICATION: ICD-10-CM

## 2022-06-30 PROCEDURE — 99213 OFFICE O/P EST LOW 20 MIN: CPT | Performed by: FAMILY MEDICINE

## 2022-06-30 RX ORDER — CLOTRIMAZOLE AND BETAMETHASONE DIPROPIONATE 10; .64 MG/G; MG/G
1 CREAM TOPICAL 2 TIMES DAILY
Qty: 15 G | Refills: 0 | Status: SHIPPED | OUTPATIENT
Start: 2022-06-30

## 2022-06-30 RX ORDER — AZITHROMYCIN 250 MG/1
TABLET, FILM COATED ORAL
Qty: 6 TABLET | Refills: 0 | Status: SHIPPED | OUTPATIENT
Start: 2022-06-30 | End: 2022-10-31

## 2022-06-30 NOTE — PROGRESS NOTES
"Chief Complaint  skin issue right thigh, sore inside nose, and Cough    Subjective        Karly Mcgrath presents to [facility]  History of Present Illness  Patient acute problem of head congestion sinus tenderness intermittent cough no fever sweats or chills been going on for about 7 days gradually getting better no myalgias  Objective   Vital Signs:  /82 (BP Location: Right arm, Patient Position: Sitting, Cuff Size: Adult)   Pulse 70   Temp 97.3 °F (36.3 °C) (Infrared)   Ht 165.1 cm (65\")   Wt 62.1 kg (136 lb 12.8 oz)   SpO2 97%   BMI 22.76 kg/m²   Estimated body mass index is 22.76 kg/m² as calculated from the following:    Height as of this encounter: 165.1 cm (65\").    Weight as of this encounter: 62.1 kg (136 lb 12.8 oz).    BMI is within normal parameters. No other follow-up for BMI required.      Physical Exam  Constitutional:       Appearance: Normal appearance.   HENT:      Nose:      Right Sinus: Maxillary sinus tenderness present.      Left Sinus: Maxillary sinus tenderness present.   Cardiovascular:      Rate and Rhythm: Normal rate and regular rhythm.      Pulses: Normal pulses.      Heart sounds: Normal heart sounds.   Pulmonary:      Effort: Pulmonary effort is normal.      Breath sounds: Rhonchi present.   Neurological:      Mental Status: She is alert.        Result Review :                Assessment and Plan   Diagnoses and all orders for this visit:    1. Acute maxillary sinusitis, recurrence not specified (Primary)    2. Bronchiectasis without complication (HCC)    Other orders  -     clotrimazole-betamethasone (Lotrisone) 1-0.05 % cream; Apply 1 application topically to the appropriate area as directed 2 (Two) Times a Day.  Dispense: 15 g; Refill: 0  -     azithromycin (Zithromax Z-Devonte) 250 MG tablet; Take 2 tablets by mouth on day 1, then 1 tablet daily on days 2-5  Dispense: 6 tablet; Refill: 0             Follow Up   Return if symptoms worsen or " fail to improve, for Recheck.  Patient was given instructions and counseling regarding her condition or for health maintenance advice. Please see specific information pulled into the AVS if appropriate.

## 2022-08-08 RX ORDER — LEVOTHYROXINE SODIUM 0.07 MG/1
TABLET ORAL
Qty: 90 TABLET | Refills: 0 | Status: SHIPPED | OUTPATIENT
Start: 2022-08-08 | End: 2022-11-02

## 2022-08-22 RX ORDER — ATORVASTATIN CALCIUM 10 MG/1
TABLET, FILM COATED ORAL
Qty: 90 TABLET | Refills: 1 | Status: SHIPPED | OUTPATIENT
Start: 2022-08-22 | End: 2023-02-15

## 2022-08-29 ENCOUNTER — TRANSCRIBE ORDERS (OUTPATIENT)
Dept: ADMINISTRATIVE | Facility: HOSPITAL | Age: 77
End: 2022-08-29

## 2022-08-29 DIAGNOSIS — J47.9 ADULT BRONCHIECTASIS: Primary | ICD-10-CM

## 2022-09-22 ENCOUNTER — HOSPITAL ENCOUNTER (OUTPATIENT)
Dept: CT IMAGING | Facility: HOSPITAL | Age: 77
Discharge: HOME | End: 2022-09-22

## 2022-09-22 DIAGNOSIS — J47.9 ADULT BRONCHIECTASIS: ICD-10-CM

## 2022-09-23 ENCOUNTER — APPOINTMENT (OUTPATIENT)
Dept: CT IMAGING | Facility: HOSPITAL | Age: 77
End: 2022-09-23

## 2022-10-04 ENCOUNTER — HOSPITAL ENCOUNTER (OUTPATIENT)
Dept: CT IMAGING | Facility: HOSPITAL | Age: 77
Discharge: HOME | End: 2022-10-04
Admitting: INTERNAL MEDICINE

## 2022-10-04 PROCEDURE — 71250 CT THORAX DX C-: CPT

## 2022-10-31 ENCOUNTER — TELEPHONE (OUTPATIENT)
Dept: INTERNAL MEDICINE | Facility: CLINIC | Age: 77
End: 2022-10-31

## 2022-10-31 ENCOUNTER — OFFICE VISIT (OUTPATIENT)
Dept: INTERNAL MEDICINE | Facility: CLINIC | Age: 77
End: 2022-10-31

## 2022-10-31 VITALS
SYSTOLIC BLOOD PRESSURE: 170 MMHG | HEART RATE: 70 BPM | HEIGHT: 65 IN | DIASTOLIC BLOOD PRESSURE: 66 MMHG | BODY MASS INDEX: 23.24 KG/M2 | WEIGHT: 139.5 LBS | OXYGEN SATURATION: 100 %

## 2022-10-31 DIAGNOSIS — E78.2 MIXED HYPERLIPIDEMIA: ICD-10-CM

## 2022-10-31 DIAGNOSIS — I10 PRIMARY HYPERTENSION: Primary | ICD-10-CM

## 2022-10-31 DIAGNOSIS — I10 PRIMARY HYPERTENSION: ICD-10-CM

## 2022-10-31 DIAGNOSIS — E03.9 HYPOTHYROIDISM, UNSPECIFIED TYPE: ICD-10-CM

## 2022-10-31 PROCEDURE — 99214 OFFICE O/P EST MOD 30 MIN: CPT | Performed by: FAMILY MEDICINE

## 2022-10-31 PROCEDURE — 1159F MED LIST DOCD IN RCRD: CPT | Performed by: FAMILY MEDICINE

## 2022-10-31 PROCEDURE — G0439 PPPS, SUBSEQ VISIT: HCPCS | Performed by: FAMILY MEDICINE

## 2022-10-31 PROCEDURE — 1170F FXNL STATUS ASSESSED: CPT | Performed by: FAMILY MEDICINE

## 2022-10-31 PROCEDURE — 1125F AMNT PAIN NOTED PAIN PRSNT: CPT | Performed by: FAMILY MEDICINE

## 2022-10-31 NOTE — PROGRESS NOTES
The ABCs of the Annual Wellness Visit  Subsequent Medicare Wellness Visit    Chief Complaint   Patient presents with   • follow up to hypertension   • follow up to hyperlipidemia   • Medicare Wellness-subsequent      Subjective    History of Present Illness:  Karly Mcgrath is a 77 y.o. female who presents for a Subsequent Medicare Wellness Visit.    The following portions of the patient's history were reviewed and   updated as appropriate: allergies, current medications, past family history, past medical history, past social history, past surgical history and problem list.     Compared to one year ago, the patient feels her physical   health is worse.    Compared to one year ago, the patient feels her mental   health is worse.    Recent Hospitalizations:  She was not admitted to the hospital during the last year.       Current Medical Providers:  Patient Care Team:  Vito Admae MD as PCP - General (Family Medicine)  Landry Hoffman MD as PCP - Family Medicine  Vito Adame MD as Referring Physician (Family Medicine)  Garcia Muro MD as Consulting Physician (Hematology and Oncology)    Outpatient Medications Prior to Visit   Medication Sig Dispense Refill   • acyclovir (ZOVIRAX) 200 MG capsule Take 1 capsule by mouth 5 (Five) Times a Day. (Patient taking differently: Take 1 capsule by mouth 5 (Five) Times a Day As Needed (cold sores).) 25 capsule 2   • acyclovir (ZOVIRAX) 5 % ointment Apply  topically to the appropriate area as directed 5 (Five) Times a Day As Needed (cold sore). 15 g 1   • amLODIPine (NORVASC) 2.5 MG tablet TAKE ONE TABLET BY MOUTH DAILY 30 tablet 5   • atorvastatin (LIPITOR) 10 MG tablet TAKE ONE TABLET BY MOUTH DAILY 90 tablet 1   • Biotin 5000 MCG tablet Take 5,000 mcg by mouth Daily.     • Calcium Carb-Cholecalciferol 600-200 MG-UNIT tablet Take 2,000 mg by mouth 2 (Two) Times a Day.     • cholecalciferol (VITAMIN D3) 1000 units tablet Take 2,000 Units by mouth Daily.     •  clotrimazole-betamethasone (Lotrisone) 1-0.05 % cream Apply 1 application topically to the appropriate area as directed 2 (Two) Times a Day. 15 g 0   • Cyanocobalamin (VITAMIN B 12 PO) Take 1,000 mcg by mouth 1 (One) Time Per Week.     • hydroCHLOROthiazide (HYDRODIURIL) 12.5 MG tablet TAKE ONE TABLET BY MOUTH DAILY 30 tablet 5   • levothyroxine (SYNTHROID, LEVOTHROID) 75 MCG tablet TAKE ONE TABLET BY MOUTH DAILY 90 tablet 0   • metoprolol succinate XL (TOPROL-XL) 50 MG 24 hr tablet TAKE ONE TABLET BY MOUTH DAILY 90 tablet 1   • Multiple Vitamins-Iron (One Daily Multivitamin/Iron) tablet Take 1 tablet by mouth Daily.     • losartan (COZAAR) 100 MG tablet TAKE ONE TABLET BY MOUTH DAILY 90 tablet 1   • omeprazole (priLOSEC) 40 MG capsule Take 40 mg by mouth As Needed. Only takes if prednisone upsets stomach. Hasn't needed it so far..     • azithromycin (Zithromax Z-Devonte) 250 MG tablet Take 2 tablets by mouth on day 1, then 1 tablet daily on days 2-5 6 tablet 0   • doxycycline (MONODOX) 100 MG capsule Take 1 capsule by mouth 2 (Two) Times a Day. 20 capsule 0   • promethazine-dextromethorphan (PROMETHAZINE-DM) 6.25-15 MG/5ML syrup Take 5 mL by mouth 4 (Four) Times a Day As Needed for Cough. 180 mL 0     Facility-Administered Medications Prior to Visit   Medication Dose Route Frequency Provider Last Rate Last Admin   • cyanocobalamin injection 1,000 mcg  1,000 mcg Intramuscular Weekly Vito Adame MD   1,000 mcg at 03/09/20 1041       No opioid medication identified on active medication list. I have reviewed chart for other potential  high risk medication/s and harmful drug interactions in the elderly.          Aspirin is not on active medication list.  Aspirin use is not indicated based on review of current medical condition/s. Risk of harm outweighs potential benefits.  .    Patient Active Problem List   Diagnosis   • Anxiety   • Bronchiectasis without complication (HCC)   • Hyperlipidemia   • Hypertension   •  "Osteopenia   • Hypothyroidism   • Health care maintenance   • Herpes simplex type 1 infection   • Urethritis   • Hypovitaminosis D   • Medicare annual wellness visit, initial   • Menopausal and postmenopausal disorder   • Vegetarian   • Thrombocytopenia (HCC)   • Medicare annual wellness visit, subsequent   • Anemia due to vitamin B12 deficiency   • Immune thrombocytopenic purpura (HCC)   • Other constipation   • Encounter for screening mammogram for malignant neoplasm of breast   • Hyponatremia     Advance Care Planning   Advance Directive is not on file.  ACP discussion was held with the patient during this visit. Patient has an advance directive (not in EMR), copy requested.          Objective       Vitals:    10/31/22 0946   BP: 170/66   BP Location: Left arm   Patient Position: Sitting   Cuff Size: Adult   Pulse: 70   SpO2: 100%   Weight: 63.3 kg (139 lb 8 oz)   Height: 165.1 cm (65\")   PainSc:   2     BMI Readings from Last 1 Encounters:   10/31/22 23.21 kg/m²   BMI is within normal parameters. No follow-up required.    Does the patient have evidence of cognitive impairment? No    Physical Exam            HEALTH RISK ASSESSMENT    Smoking Status:  Social History     Tobacco Use   Smoking Status Never   Smokeless Tobacco Never     Alcohol Consumption:  Social History     Substance and Sexual Activity   Alcohol Use No     Fall Risk Screen:    STEADI Fall Risk Assessment was completed, and patient is at LOW risk for falls.Assessment completed on:10/31/2022    Depression Screening:  PHQ-2/PHQ-9 Depression Screening 10/31/2022   Retired PHQ-9 Total Score -   Retired Total Score -   Little Interest or Pleasure in Doing Things 0-->not at all   Feeling Down, Depressed or Hopeless 0-->not at all   PHQ-9: Brief Depression Severity Measure Score 0       Health Habits and Functional and Cognitive Screening:  Functional & Cognitive Status 10/31/2022   Do you have difficulty preparing food and eating? No   Do you have " difficulty bathing yourself, getting dressed or grooming yourself? No   Do you have difficulty using the toilet? No   Do you have difficulty moving around from place to place? No   Do you have trouble with steps or getting out of a bed or a chair? No   Current Diet Well Balanced Diet   Dental Exam Up to date   Eye Exam Up to date   Exercise (times per week) 3 times per week   Current Exercises Include House Cleaning;Yard Work   Current Exercise Activities Include -   Do you need help using the phone?  No   Are you deaf or do you have serious difficulty hearing?  Yes   Do you need help with transportation? No   Do you need help shopping? No   Do you need help preparing meals?  No   Do you need help with housework?  No   Do you need help with laundry? No   Do you need help taking your medications? No   Do you need help managing money? No   Do you ever drive or ride in a car without wearing a seat belt? No   Have you felt unusual stress, anger or loneliness in the last month? No   Who do you live with? Spouse   If you need help, do you have trouble finding someone available to you? No   Have you been bothered in the last four weeks by sexual problems? No   Do you have difficulty concentrating, remembering or making decisions? No       Age-appropriate Screening Schedule:  Refer to the list below for future screening recommendations based on patient's age, sex and/or medical conditions. Orders for these recommended tests are listed in the plan section. The patient has been provided with a written plan.    Health Maintenance   Topic Date Due   • ZOSTER VACCINE (1 of 2) Never done   • DXA SCAN  06/12/2021   • LIPID PANEL  09/10/2021   • MAMMOGRAM  01/17/2024   • INFLUENZA VACCINE  Discontinued   • TDAP/TD VACCINES  Discontinued              Assessment & Plan     CMS Preventative Services Quick Reference  Risk Factors Identified During Encounter  Cardiovascular Disease  Depression/Dysphoria  Inactivity/Sedentary     Recent  stressors with deaths in the family    The above risks/problems have been discussed with the patient.  Follow up actions/plans if indicated are seen below in the Assessment/Plan Section.  Pertinent information has been shared with the patient in the After Visit Summary.    Diagnoses and all orders for this visit:    1. Primary hypertension (Primary)  -     Cancel: Comprehensive Metabolic Panel; Future  -     azilsartan medoxomil (Edarbi) 80 MG tablet tablet; Take 1 tablet by mouth Daily.  Dispense: 30 tablet; Refill: 0  -     Comprehensive Metabolic Panel; Future    2. Mixed hyperlipidemia  -     Cancel: Lipid Panel; Future  -     Lipid Panel; Future    3. Hypothyroidism, unspecified type  -     Cancel: TSH; Future  -     Cancel: T4, Free; Future  -     T4, Free; Future  -     TSH; Future        Follow Up:   Return in about 1 month (around 11/30/2022), or if symptoms worsen or fail to improve, for Recheck.     An After Visit Summary and PPPS were given to the patient.  Ongoing  management of chronic medical problems.

## 2022-10-31 NOTE — PROGRESS NOTES
"Chief Complaint  follow up to hypertension, follow up to hyperlipidemia, and Medicare Wellness-subsequent    Subjective        Karly Mcgrath presents to [facility]  History of Present Illness  Patient follows up for elevated blood pressure readings there is been increased stress at home with deaths in the family sisters she is afraid to check her blood pressure because she knows this could be elevated she does not have any specific headache or dizziness  But does feel distressed her sleep is good  Objective   Vital Signs:  /66 (BP Location: Left arm, Patient Position: Sitting, Cuff Size: Adult)   Pulse 70   Ht 165.1 cm (65\")   Wt 63.3 kg (139 lb 8 oz)   SpO2 100%   BMI 23.21 kg/m²   Estimated body mass index is 23.21 kg/m² as calculated from the following:    Height as of this encounter: 165.1 cm (65\").    Weight as of this encounter: 63.3 kg (139 lb 8 oz).    BMI is within normal parameters. No other follow-up for BMI required.      Physical Exam  Vitals and nursing note reviewed.   Constitutional:       Appearance: Normal appearance.   Cardiovascular:      Rate and Rhythm: Normal rate and regular rhythm.      Pulses: Normal pulses.   Pulmonary:      Effort: Pulmonary effort is normal.      Breath sounds: Normal breath sounds.   Musculoskeletal:      Right lower leg: No edema.      Left lower leg: No edema.   Neurological:      Mental Status: She is alert.      Motor: No weakness.   Psychiatric:         Thought Content: Thought content normal.         Judgment: Judgment normal.        Result Review :    Common labs    Common Labs 11/24/21 3/24/22 3/24/22 5/12/22     0033 0033    Glucose   114 (A)    BUN   11    Creatinine   0.72    Sodium   122 (A)    Potassium   3.8    Chloride   83 (A)    Calcium   9.3    Albumin   4.20    Total Bilirubin   1.0    Alkaline Phosphatase   105    AST (SGOT)   18    ALT (SGPT)   16    WBC 6.71 7.33  6.27   Hemoglobin 13.1 13.1  13.5 "   Hematocrit 38.4 37.7  37.7   Platelets 159 134 (A)  139 (A)   (A) Abnormal value                      Assessment and Plan   Diagnoses and all orders for this visit:    1. Primary hypertension (Primary)  -     Cancel: Comprehensive Metabolic Panel; Future  -     azilsartan medoxomil (Edarbi) 80 MG tablet tablet; Take 1 tablet by mouth Daily.  Dispense: 30 tablet; Refill: 0  -     Comprehensive Metabolic Panel; Future    2. Mixed hyperlipidemia  -     Cancel: Lipid Panel; Future  -     Lipid Panel; Future    3. Hypothyroidism, unspecified type  -     Cancel: TSH; Future  -     Cancel: T4, Free; Future  -     T4, Free; Future  -     TSH; Future    Follow-up results of testing stop losartan start Edarbi for blood pressure control  Monitor blood pressure goal is to be regularly less than 140/90         Follow Up   Return in about 1 month (around 11/30/2022), or if symptoms worsen or fail to improve, for Recheck.  Patient was given instructions and counseling regarding her condition or for health maintenance advice. Please see specific information pulled into the AVS if appropriate.

## 2022-11-01 LAB
ALBUMIN SERPL-MCNC: 4.2 G/DL (ref 3.5–5.2)
ALBUMIN/GLOB SERPL: 2.1 G/DL
ALP SERPL-CCNC: 87 U/L (ref 39–117)
ALT SERPL-CCNC: 14 U/L (ref 1–33)
AST SERPL-CCNC: 18 U/L (ref 1–32)
BILIRUB SERPL-MCNC: 0.7 MG/DL (ref 0–1.2)
BUN SERPL-MCNC: 25 MG/DL (ref 8–23)
BUN/CREAT SERPL: 29.8 (ref 7–25)
CALCIUM SERPL-MCNC: 9.9 MG/DL (ref 8.6–10.5)
CHLORIDE SERPL-SCNC: 95 MMOL/L (ref 98–107)
CHOLEST SERPL-MCNC: 165 MG/DL (ref 0–200)
CO2 SERPL-SCNC: 26.6 MMOL/L (ref 22–29)
CREAT SERPL-MCNC: 0.84 MG/DL (ref 0.57–1)
EGFRCR SERPLBLD CKD-EPI 2021: 71.2 ML/MIN/1.73
GLOBULIN SER CALC-MCNC: 2 GM/DL
GLUCOSE SERPL-MCNC: 92 MG/DL (ref 65–99)
HDLC SERPL-MCNC: 85 MG/DL (ref 40–60)
LDLC SERPL CALC-MCNC: 69 MG/DL (ref 0–100)
POTASSIUM SERPL-SCNC: 4.1 MMOL/L (ref 3.5–5.2)
PROT SERPL-MCNC: 6.2 G/DL (ref 6–8.5)
SODIUM SERPL-SCNC: 133 MMOL/L (ref 136–145)
T4 FREE SERPL-MCNC: 1.44 NG/DL (ref 0.93–1.7)
TRIGL SERPL-MCNC: 53 MG/DL (ref 0–150)
TSH SERPL DL<=0.005 MIU/L-ACNC: 1.85 UIU/ML (ref 0.27–4.2)
VLDLC SERPL CALC-MCNC: 11 MG/DL (ref 5–40)

## 2022-11-02 RX ORDER — METOPROLOL SUCCINATE 50 MG/1
TABLET, EXTENDED RELEASE ORAL
Qty: 90 TABLET | Refills: 1 | Status: SHIPPED | OUTPATIENT
Start: 2022-11-02

## 2022-11-02 RX ORDER — LEVOTHYROXINE SODIUM 0.07 MG/1
TABLET ORAL
Qty: 90 TABLET | Refills: 0 | Status: SHIPPED | OUTPATIENT
Start: 2022-11-02 | End: 2023-01-30

## 2022-11-02 NOTE — PROGRESS NOTES
Labs stable continue medications at change as changed call with blood pressure readings in 2 weeks do not run out of Edarbi

## 2022-11-15 ENCOUNTER — TRANSCRIBE ORDERS (OUTPATIENT)
Dept: ADMINISTRATIVE | Facility: HOSPITAL | Age: 77
End: 2022-11-15

## 2022-11-15 DIAGNOSIS — J47.9 BRONCHIECTASIS WITHOUT COMPLICATION: Primary | ICD-10-CM

## 2022-11-15 DIAGNOSIS — R91.1 LUNG NODULE: ICD-10-CM

## 2022-11-21 ENCOUNTER — TELEPHONE (OUTPATIENT)
Dept: INTERNAL MEDICINE | Facility: CLINIC | Age: 77
End: 2022-11-21

## 2022-11-21 DIAGNOSIS — I10 PRIMARY HYPERTENSION: ICD-10-CM

## 2022-11-21 NOTE — TELEPHONE ENCOUNTER
Caller: Karly Mcgrath    Relationship: Self    Best call back number:[phone]    PATIENT WOULD LIKE TO KNOW IF SHE NEEDS TO GET A SCRIPT SENT TO PHARMACY OF azilsartan medoxomil (Edarbi) 80 MG tablet tablet OR GO BACK ON LOSARTAN.     SHE ONLY HAS TWO SAMPLE PILLS LEFT OF EDARBI.    [facility] [id number] - [facility] [id number] AT [facility] [address] - [phone] [facility] [phone]   [phone]

## 2022-11-23 ENCOUNTER — TELEPHONE (OUTPATIENT)
Dept: INTERNAL MEDICINE | Facility: CLINIC | Age: 77
End: 2022-11-23

## 2022-11-23 NOTE — TELEPHONE ENCOUNTER
Caller: [place] Mcgrath    Relationship: Emergency Contact    Best call back number: [phone]    What is the best time to reach you: ANYTIME    Who are you requesting to speak with (clinical staff, provider,  specific staff member): CLINICAL    What was the call regarding: PATIENTS SPOUSE STATES THE PATIENTS azilsartan medoxomil (Edarbi) 80 MG tablet tablet PRESCRIPTION IS GOING TO COST THEM $122 OUT OF POCKET AND IS WANTING TO KNOW IF ANYTHING ELSE CAN BE CALLED IN FOR HER.     PLEASE CALL PATIENTS SPOUSE BACK AS SOON AS POSSIBLE.

## 2022-11-23 NOTE — TELEPHONE ENCOUNTER
Patient's  notified that he is able to  samples in the office and will see if patient can restart the losartan.  Please advise.

## 2022-11-23 NOTE — TELEPHONE ENCOUNTER
PATIENT CALLED BACK TO FOLLOW UP ON THIS QUESTION... WILL  SCRIPT TODAY. SHE HAD SOME READINGS JOTTED DOWN BUT THEY WHERE NOT IN ORDER OR CONSISTENT SO SHE IS STARTING A NEW WEEK OF WRITING DOWN READINGS AFTER PICKING UP SCRIPT AND WILL CALL WITH THOSE.     [phone]

## 2022-11-28 RX ORDER — LOSARTAN POTASSIUM 100 MG/1
TABLET ORAL
Qty: 90 TABLET | Refills: 0 | OUTPATIENT
Start: 2022-11-28

## 2022-11-28 NOTE — TELEPHONE ENCOUNTER
Losartan was ineffective and I suspect other medications in that category will also be less effective.  Moving to another category of medication usually associated with more side effects.

## 2022-11-29 NOTE — TELEPHONE ENCOUNTER
Patient stated that she will take her blood pressure for another week and will let the office know the results.

## 2022-11-30 ENCOUNTER — OFFICE VISIT (OUTPATIENT)
Dept: ONCOLOGY | Facility: CLINIC | Age: 77
End: 2022-11-30

## 2022-11-30 ENCOUNTER — LAB (OUTPATIENT)
Dept: OTHER | Facility: HOSPITAL | Age: 77
End: 2022-11-30

## 2022-11-30 ENCOUNTER — TELEPHONE (OUTPATIENT)
Dept: ONCOLOGY | Facility: CLINIC | Age: 77
End: 2022-11-30

## 2022-11-30 VITALS
DIASTOLIC BLOOD PRESSURE: 83 MMHG | HEART RATE: 86 BPM | BODY MASS INDEX: 23.76 KG/M2 | SYSTOLIC BLOOD PRESSURE: 158 MMHG | RESPIRATION RATE: 16 BRPM | TEMPERATURE: 97.8 F | OXYGEN SATURATION: 96 % | WEIGHT: 142.6 LBS | HEIGHT: 65 IN

## 2022-11-30 DIAGNOSIS — D69.6 THROMBOCYTOPENIA: ICD-10-CM

## 2022-11-30 DIAGNOSIS — D50.9 IRON DEFICIENCY ANEMIA, UNSPECIFIED IRON DEFICIENCY ANEMIA TYPE: ICD-10-CM

## 2022-11-30 DIAGNOSIS — J20.9 ACUTE BRONCHITIS, UNSPECIFIED ORGANISM: ICD-10-CM

## 2022-11-30 DIAGNOSIS — D69.3 IMMUNE THROMBOCYTOPENIC PURPURA: Primary | ICD-10-CM

## 2022-11-30 DIAGNOSIS — D51.3 OTHER DIETARY VITAMIN B12 DEFICIENCY ANEMIA: ICD-10-CM

## 2022-11-30 LAB
BASOPHILS # BLD AUTO: 0.04 10*3/MM3 (ref 0–0.2)
BASOPHILS NFR BLD AUTO: 0.3 % (ref 0–1.5)
DEPRECATED RDW RBC AUTO: 42.5 FL (ref 37–54)
EOSINOPHIL # BLD AUTO: 0.07 10*3/MM3 (ref 0–0.4)
EOSINOPHIL NFR BLD AUTO: 0.6 % (ref 0.3–6.2)
ERYTHROCYTE [DISTWIDTH] IN BLOOD BY AUTOMATED COUNT: 12.9 % (ref 12.3–15.4)
FERRITIN SERPL-MCNC: 244.5 NG/ML (ref 13–150)
HCT VFR BLD AUTO: 34.5 % (ref 34–46.6)
HGB BLD-MCNC: 11.5 G/DL (ref 12–15.9)
IMM GRANULOCYTES # BLD AUTO: 0.1 10*3/MM3 (ref 0–0.05)
IMM GRANULOCYTES NFR BLD AUTO: 0.8 % (ref 0–0.5)
IRON 24H UR-MRATE: 21 MCG/DL (ref 37–145)
IRON SATN MFR SERPL: 8 % (ref 20–50)
LYMPHOCYTES # BLD AUTO: 1.38 10*3/MM3 (ref 0.7–3.1)
LYMPHOCYTES NFR BLD AUTO: 10.9 % (ref 19.6–45.3)
MCH RBC QN AUTO: 30 PG (ref 26.6–33)
MCHC RBC AUTO-ENTMCNC: 33.3 G/DL (ref 31.5–35.7)
MCV RBC AUTO: 90.1 FL (ref 79–97)
MONOCYTES # BLD AUTO: 1.3 10*3/MM3 (ref 0.1–0.9)
MONOCYTES NFR BLD AUTO: 10.3 % (ref 5–12)
NEUTROPHILS NFR BLD AUTO: 77.1 % (ref 42.7–76)
NEUTROPHILS NFR BLD AUTO: 9.77 10*3/MM3 (ref 1.7–7)
NRBC BLD AUTO-RTO: 0 /100 WBC (ref 0–0.2)
PLATELET # BLD AUTO: 146 10*3/MM3 (ref 140–450)
PMV BLD AUTO: 10.7 FL (ref 6–12)
RBC # BLD AUTO: 3.83 10*6/MM3 (ref 3.77–5.28)
TIBC SERPL-MCNC: 276 MCG/DL (ref 298–536)
TRANSFERRIN SERPL-MCNC: 185 MG/DL (ref 200–360)
VIT B12 BLD-MCNC: 393 PG/ML (ref 211–946)
WBC NRBC COR # BLD: 12.66 10*3/MM3 (ref 3.4–10.8)

## 2022-11-30 PROCEDURE — 83540 ASSAY OF IRON: CPT | Performed by: INTERNAL MEDICINE

## 2022-11-30 PROCEDURE — 36415 COLL VENOUS BLD VENIPUNCTURE: CPT

## 2022-11-30 PROCEDURE — 99214 OFFICE O/P EST MOD 30 MIN: CPT | Performed by: INTERNAL MEDICINE

## 2022-11-30 PROCEDURE — 82728 ASSAY OF FERRITIN: CPT | Performed by: INTERNAL MEDICINE

## 2022-11-30 PROCEDURE — 82607 VITAMIN B-12: CPT | Performed by: INTERNAL MEDICINE

## 2022-11-30 PROCEDURE — 84466 ASSAY OF TRANSFERRIN: CPT | Performed by: INTERNAL MEDICINE

## 2022-11-30 PROCEDURE — 85025 COMPLETE CBC W/AUTO DIFF WBC: CPT | Performed by: INTERNAL MEDICINE

## 2022-11-30 RX ORDER — METHYLPREDNISOLONE 4 MG/1
TABLET ORAL
Qty: 21 TABLET | Refills: 0 | Status: SHIPPED | OUTPATIENT
Start: 2022-11-30

## 2022-11-30 RX ORDER — AZITHROMYCIN 250 MG/1
TABLET, FILM COATED ORAL
Qty: 6 TABLET | Refills: 0 | Status: SHIPPED | OUTPATIENT
Start: 2022-11-30

## 2022-11-30 NOTE — TELEPHONE ENCOUNTER
----- Message from Garcia Muro MD sent at 11/30/2022 12:01 PM EST -----  Please inform the patient that her labs showed vitamin B12 level to have decreased.  I recommend increasing vitamin B12 to once daily.    Thank you

## 2022-11-30 NOTE — PROGRESS NOTES
Subjective     CHIEF COMPLAINT:      Chief Complaint   Patient presents with   • Annual Exam     Cough        HISTORY OF PRESENT ILLNESS:     Karly Mcgrath is a 77 y.o. female patient who returns today for follow up on her thrombocytopenia.  She returns today for follow-up reporting developing cough 2 days ago.  It is productive of clear sputum.  The cough worsens when she lies flat. No fever or chills.  No generalized achiness.  No sore throat.  No chest pain.     Patient has been taking the multi vitamin daily.  She is taking vitamin B-12 once weekly as instructed.    ROS:  Pertinent ROS is in the HPI.     Past medical, surgical, social and family history were reviewed.     MEDICATIONS:    Current Outpatient Medications:   •  amLODIPine (NORVASC) 2.5 MG tablet, TAKE ONE TABLET BY MOUTH DAILY, Disp: 30 tablet, Rfl: 5  •  atorvastatin (LIPITOR) 10 MG tablet, TAKE ONE TABLET BY MOUTH DAILY, Disp: 90 tablet, Rfl: 1  •  azilsartan medoxomil (Edarbi) 80 MG tablet tablet, Take 1 tablet by mouth Daily., Disp: 30 tablet, Rfl: 3  •  Biotin 5000 MCG tablet, Take 5,000 mcg by mouth Daily., Disp: , Rfl:   •  Calcium Carb-Cholecalciferol 600-200 MG-UNIT tablet, Take 2,000 mg by mouth 2 (Two) Times a Day., Disp: , Rfl:   •  cholecalciferol (VITAMIN D3) 1000 units tablet, Take 2,000 Units by mouth Daily., Disp: , Rfl:   •  clotrimazole-betamethasone (Lotrisone) 1-0.05 % cream, Apply 1 application topically to the appropriate area as directed 2 (Two) Times a Day., Disp: 15 g, Rfl: 0  •  Cyanocobalamin (VITAMIN B 12 PO), Take 1,000 mcg by mouth 1 (One) Time Per Week., Disp: , Rfl:   •  hydroCHLOROthiazide (HYDRODIURIL) 12.5 MG tablet, TAKE ONE TABLET BY MOUTH DAILY, Disp: 30 tablet, Rfl: 5  •  levothyroxine (SYNTHROID, LEVOTHROID) 75 MCG tablet, TAKE ONE TABLET BY MOUTH DAILY, Disp: 90 tablet, Rfl: 0  •  metoprolol succinate XL (TOPROL-XL) 50 MG 24 hr tablet, TAKE ONE TABLET BY MOUTH DAILY, Disp: 90 tablet, Rfl: 1  •  Multiple  "Vitamins-Iron (One Daily Multivitamin/Iron) tablet, Take 1 tablet by mouth Daily., Disp: , Rfl:   •  acyclovir (ZOVIRAX) 200 MG capsule, Take 1 capsule by mouth 5 (Five) Times a Day. (Patient taking differently: Take 200 mg by mouth 5 (Five) Times a Day As Needed (cold sores).), Disp: 25 capsule, Rfl: 2  •  acyclovir (ZOVIRAX) 5 % ointment, Apply  topically to the appropriate area as directed 5 (Five) Times a Day As Needed (cold sore)., Disp: 15 g, Rfl: 1  No current facility-administered medications for this visit.     Objective     VITAL SIGNS:     Vitals:    11/30/22 1113   BP: 158/83   Pulse: 86   Resp: 16   Temp: 97.8 °F (36.6 °C)   TempSrc: Temporal   SpO2: 96%   Weight: 64.7 kg (142 lb 9.6 oz)   Height: 165.1 cm (65\")   PainSc: 0-No pain     Body mass index is 23.73 kg/m².     Wt Readings from Last 5 Encounters:   11/30/22 64.7 kg (142 lb 9.6 oz)   10/31/22 63.3 kg (139 lb 8 oz)   06/30/22 62.1 kg (136 lb 12.8 oz)   05/12/22 61.7 kg (136 lb)   04/28/22 62.9 kg (138 lb 11.2 oz)     PHYSICAL EXAMINATION:  GENERAL: The patient appears in fair general condition, not in acute distress.   SKIN: No ecchymosis.  EYES: No jaundice. No pallor.  LYMPHATICS: No cervical, supraclavicular or axillary lymphadenopathy.  CHEST: Normal respiratory effort.  Bilateral fine crepitations.  CVS: Normal S1-S2.  No murmurs.  ABDOMEN: Soft. No tenderness. No Hepatomegaly. No Splenomegaly. No masses.  EXTREMITIES: No noted deformity.     DIAGNOSTIC DATA:     Results from last 7 days   Lab Units 11/30/22  1104   WBC 10*3/mm3 12.66*   NEUTROS ABS 10*3/mm3 9.77*   HEMOGLOBIN g/dL 11.5*   HEMATOCRIT % 34.5   PLATELETS 10*3/mm3 146         Lab 11/30/22  1104   IRON 21*   IRON SATURATION 8*   TIBC 276*   TRANSFERRIN 185*   FERRITIN 244.50*   VITAMIN B 12 393      Assessment & Plan    *Acute thrombocytopenia diagnosed on 9/30/2020.    · Patient has prior history of chronic thrombocytopenia.  Platelet count was 119,000 on 8/28/2019. "   · Platelet count decreased to 44,000 on 9/10/2020.   · Platelet count decreased to 11,000 on 9/30/2020.   · Flow cytometry on 9/30/2021 revealed no evidence of leukemia or lymphoma.  · Patient was started on prednisone 1 mg/kg daily on 9/30/2020.    · The dose of prednisone was slowly tapered.  · After the dose was reduced to 10 mg a day, platelets decreased to 124,000 on 12/23/2020.  · The dose was increased back to 20 mg a day on 12/23/2020.  · Platelet count improved 159,000 on 1/6/2021.  · The dose was slowly tapered afterwards. Prednisone was stopped on 3/3/2021.  · Platelets were normal at 157,000 on 6/9/2021.  · Platelet count is 146,000 today.  · She is not having problems with excessive bruising or bleeding.    *Iron deficiency anemia.  · Hemoglobin was 11.1 on 9/10/2020.  · Patient was started on ferrous sulfate 325 mg daily on 9/30/2020.  · Hemoglobin improved to 14.8 on 1/6/2021. Iron stores improved.   · Ferrous sulfate changed to once weekly on 1/6/2021.  · Patient was tolerating the ferrous sulfate until April 2021 when she developed severe constipation and went to the ER. Ferrous sulfate was stopped.  · Hemoglobin decreased to 12.9 on 6/9/2021. Iron stores decreased.   · Due to her prior problem with severe constipation, we did not recommend treatment with oral iron.  · She was started on multivitamin with iron once daily on 6/9/2021.  · Hemoglobin was 13.1 on 11/24/2021.   · Hemoglobin decreased to 11.5 today.  · Transferrin saturation decreased to 8%.  Ferritin is elevated at 244 secondary to the acute bronchitis.  · She is not sure if her multivitamin has iron.    *Vitamin B12 deficiency.  · Patient was on B12 1000 mcg orally daily.  · B12 level became elevated at 1947 on 11/11/2020.  · B12 was reduced to once weekly on 10/14/2020.   · Vitamin B12 level decreased to 608 on 6/9/2021.  · Vitamin B12 once weekly was continued.  · Vitamin B12 was 429 on 11/24/2021.    · Vitamin B12 decreased to 393  today.    *Acute bronchitis.  · Patient's symptoms started 2 days ago.  · She does not have upper respiratory symptoms.  · Lung exam revealed bilateral crepitations, attributed to her bronchiectasis.  · I recommended treatment with antibiotic and steroid.  · If symptoms do not improve, I asked her to notify us or to notify her pulmonologist.  A CT scan would be recommended at that point.    PLAN:    1.  I asked her to take multivitamin with iron that contains 18 mg of iron once daily.    2.  Increase vitamin B12 back to once daily.  3.  We will treat with Z-Devonte and Medrol Dosepak.  4.  Follow-up in 6 months with CBC ferritin iron panel B12 levels.          Garcia Muro MD  11/30/22

## 2022-12-01 RX ORDER — HYDROCHLOROTHIAZIDE 12.5 MG/1
TABLET ORAL
Qty: 30 TABLET | Refills: 5 | Status: SHIPPED | OUTPATIENT
Start: 2022-12-01

## 2022-12-01 RX ORDER — AMLODIPINE BESYLATE 2.5 MG/1
TABLET ORAL
Qty: 30 TABLET | Refills: 5 | Status: SHIPPED | OUTPATIENT
Start: 2022-12-01

## 2022-12-07 ENCOUNTER — TELEPHONE (OUTPATIENT)
Dept: INTERNAL MEDICINE | Facility: CLINIC | Age: 77
End: 2022-12-07

## 2022-12-07 NOTE — TELEPHONE ENCOUNTER
Caller: Karly Mcgrath    Relationship to patient: Self    Best call back number: [phone]    Patient is needing: PATIENT CALLED TO REPORT HER BLOOD PRESSURE READINGS FOR THE PAST 2 WEEKS, SHE SAID IT WAS NOT TAKEN AT THE SAME TIME EACH DAY    11/23 - 138/62    11/24 - 145/59    11/25 - 149/69    11/26 - 134/54    11/28 - 144/74    11/29 - 146/64    11/30 - 125/52     12/01 - 132/64    12/02 - 117/64    12/03 - 127/66    12/04 - 129/64    12/05 - 123/72     12/06 - 152/82    12/07 - 144/69

## 2022-12-08 NOTE — TELEPHONE ENCOUNTER
Patient notified and expressed understanding.  Se thought that Dr. Adame told her that she could stop taking the water pill but wanted to make sure - please advise.

## 2022-12-16 NOTE — TELEPHONE ENCOUNTER
Patient stated that she was taking the water pill when she submitted the blood pressure readings.  She was told to continue taking the water pill.

## 2023-01-23 ENCOUNTER — TELEPHONE (OUTPATIENT)
Dept: INTERNAL MEDICINE | Facility: CLINIC | Age: 78
End: 2023-01-23
Payer: MEDICARE

## 2023-01-23 NOTE — TELEPHONE ENCOUNTER
1/23/23 TRANSFERRED TO [facility] REGARDING MEDICATION ADARBY (SP?)

## 2023-01-25 ENCOUNTER — TELEPHONE (OUTPATIENT)
Dept: INTERNAL MEDICINE | Facility: CLINIC | Age: 78
End: 2023-01-25
Payer: MEDICARE

## 2023-01-25 NOTE — TELEPHONE ENCOUNTER
Patient's  called stating that patient was to have a prescription for Edarbi sent to [facility].  Please advise.

## 2023-01-26 DIAGNOSIS — I10 PRIMARY HYPERTENSION: ICD-10-CM

## 2023-01-30 RX ORDER — LEVOTHYROXINE SODIUM 0.07 MG/1
TABLET ORAL
Qty: 90 TABLET | Refills: 0 | Status: SHIPPED | OUTPATIENT
Start: 2023-01-30

## 2023-02-15 RX ORDER — ATORVASTATIN CALCIUM 10 MG/1
TABLET, FILM COATED ORAL
Qty: 90 TABLET | Refills: 0 | Status: SHIPPED | OUTPATIENT
Start: 2023-02-15

## 2023-04-28 ENCOUNTER — TELEPHONE (OUTPATIENT)
Dept: INTERNAL MEDICINE | Facility: CLINIC | Age: 78
End: 2023-04-28

## 2023-04-28 ENCOUNTER — OFFICE VISIT (OUTPATIENT)
Dept: INTERNAL MEDICINE | Facility: CLINIC | Age: 78
End: 2023-04-28
Payer: MEDICARE

## 2023-04-28 VITALS
HEART RATE: 75 BPM | RESPIRATION RATE: 14 BRPM | BODY MASS INDEX: 23.66 KG/M2 | HEIGHT: 65 IN | DIASTOLIC BLOOD PRESSURE: 64 MMHG | SYSTOLIC BLOOD PRESSURE: 128 MMHG | WEIGHT: 142 LBS | OXYGEN SATURATION: 98 %

## 2023-04-28 DIAGNOSIS — F41.9 ANXIETY: ICD-10-CM

## 2023-04-28 DIAGNOSIS — M85.89 OTHER SPECIFIED DISORDERS OF BONE DENSITY AND STRUCTURE, MULTIPLE SITES: ICD-10-CM

## 2023-04-28 DIAGNOSIS — E55.9 HYPOVITAMINOSIS D: ICD-10-CM

## 2023-04-28 DIAGNOSIS — E78.2 MIXED HYPERLIPIDEMIA: Primary | ICD-10-CM

## 2023-04-28 DIAGNOSIS — I10 PRIMARY HYPERTENSION: ICD-10-CM

## 2023-04-28 DIAGNOSIS — M85.80 OSTEOPENIA, UNSPECIFIED LOCATION: ICD-10-CM

## 2023-04-28 PROBLEM — L85.3 DRY SKIN DERMATITIS: Status: ACTIVE | Noted: 2023-04-28

## 2023-04-28 PROCEDURE — 3078F DIAST BP <80 MM HG: CPT | Performed by: FAMILY MEDICINE

## 2023-04-28 PROCEDURE — 3074F SYST BP LT 130 MM HG: CPT | Performed by: FAMILY MEDICINE

## 2023-04-28 PROCEDURE — 99214 OFFICE O/P EST MOD 30 MIN: CPT | Performed by: FAMILY MEDICINE

## 2023-04-28 RX ORDER — BUSPIRONE HYDROCHLORIDE 5 MG/1
5 TABLET ORAL 3 TIMES DAILY
Qty: 180 TABLET | Refills: 2 | Status: SHIPPED | OUTPATIENT
Start: 2023-04-28

## 2023-04-28 RX ORDER — LEVOTHYROXINE SODIUM 0.07 MG/1
TABLET ORAL
Qty: 90 TABLET | Refills: 0 | Status: SHIPPED | OUTPATIENT
Start: 2023-04-28

## 2023-04-28 NOTE — PROGRESS NOTES
"Chief Complaint  Hypertension    Subjective        Karly Mcgrath presents to [facility]  History of Present Illness  Patient follows up for ongoing management of hypertension hyperlipidemia and vitamin D deficiency blood pressures been fairly well controlled with amlodipine and Edarbi she stopped taking hydrochlorothiazide  Feels stable no unwanted side effects of medication  She would like  to restart the BuSpar that she is taking in the past for anxiety  Objective   Vital Signs:  /64 (BP Location: Left arm, Patient Position: Sitting, Cuff Size: Adult)   Pulse 75   Resp 14   Ht 165.1 cm (65\")   Wt 64.4 kg (142 lb)   SpO2 98%   BMI 23.63 kg/m²   Estimated body mass index is 23.63 kg/m² as calculated from the following:    Height as of this encounter: 165.1 cm (65\").    Weight as of this encounter: 64.4 kg (142 lb).       BMI is within normal parameters. No other follow-up for BMI required.      Physical Exam  Constitutional:       Appearance: Normal appearance.   HENT:      Head: Normocephalic.   Cardiovascular:      Rate and Rhythm: Normal rate and regular rhythm.      Pulses: Normal pulses.      Heart sounds: Normal heart sounds.   Pulmonary:      Effort: Pulmonary effort is normal.   Musculoskeletal:      Right lower leg: Edema present.      Left lower leg: Edema present.      Comments: Trace   Neurological:      Mental Status: She is alert.   Psychiatric:         Mood and Affect: Mood normal.         Behavior: Behavior normal.         Thought Content: Thought content normal.         Judgment: Judgment normal.        Result Review :    Common labs        5/12/2022    10:19 10/31/2022    10:56 11/30/2022    11:04   Common Labs   Glucose  92      BUN  25      Creatinine  0.84      Sodium  133      Potassium  4.1      Chloride  95      Calcium  9.9      Total Protein  6.2      Albumin  4.20      Total Bilirubin  0.7      Alkaline Phosphatase  87      AST (SGOT)  18    "   ALT (SGPT)  14      WBC 6.27    12.66     Hemoglobin 13.5    11.5     Hematocrit 37.7    34.5     Platelets 139    146     Total Cholesterol  165      Triglycerides  53      HDL Cholesterol  85      LDL Cholesterol   69                     Assessment and Plan   Diagnoses and all orders for this visit:    1. Mixed hyperlipidemia (Primary)    2. Primary hypertension  -     Comprehensive Metabolic Panel    3. Osteopenia, unspecified location  -     DEXA Bone Density Axial; Future    4. Hypovitaminosis D  -     Vitamin D,25-Hydroxy    5. Anxiety  -     busPIRone (BUSPAR) 5 MG tablet; Take 1 tablet by mouth 3 (Three) Times a Day.  Dispense: 180 tablet; Refill: 2    6. Other specified disorders of bone density and structure, multiple sites  -     DEXA Bone Density Axial; Future      Results of testing otherwise as needed or       Follow Up   Return in about 6 months (around 10/28/2023), or if symptoms worsen or fail to improve, for Recheck.  Patient was given instructions and counseling regarding her condition or for health maintenance advice. Please see specific information pulled into the AVS if appropriate.

## 2023-04-29 LAB
25(OH)D3+25(OH)D2 SERPL-MCNC: 104 NG/ML (ref 30–100)
ALBUMIN SERPL-MCNC: 4 G/DL (ref 3.5–5.2)
ALBUMIN/GLOB SERPL: 1.6 G/DL
ALP SERPL-CCNC: 83 U/L (ref 39–117)
ALT SERPL-CCNC: 17 U/L (ref 1–33)
AST SERPL-CCNC: 18 U/L (ref 1–32)
BILIRUB SERPL-MCNC: 0.7 MG/DL (ref 0–1.2)
BUN SERPL-MCNC: 25 MG/DL (ref 8–23)
BUN/CREAT SERPL: 30.5 (ref 7–25)
CALCIUM SERPL-MCNC: 10.4 MG/DL (ref 8.6–10.5)
CHLORIDE SERPL-SCNC: 96 MMOL/L (ref 98–107)
CO2 SERPL-SCNC: 28.1 MMOL/L (ref 22–29)
CREAT SERPL-MCNC: 0.82 MG/DL (ref 0.57–1)
EGFRCR SERPLBLD CKD-EPI 2021: 73.3 ML/MIN/1.73
GLOBULIN SER CALC-MCNC: 2.5 GM/DL
GLUCOSE SERPL-MCNC: 107 MG/DL (ref 65–99)
POTASSIUM SERPL-SCNC: 4.8 MMOL/L (ref 3.5–5.2)
PROT SERPL-MCNC: 6.5 G/DL (ref 6–8.5)
SODIUM SERPL-SCNC: 133 MMOL/L (ref 136–145)

## 2023-05-01 RX ORDER — METOPROLOL SUCCINATE 50 MG/1
TABLET, EXTENDED RELEASE ORAL
Qty: 90 TABLET | Refills: 1 | Status: SHIPPED | OUTPATIENT
Start: 2023-05-01

## 2023-05-15 RX ORDER — ATORVASTATIN CALCIUM 10 MG/1
TABLET, FILM COATED ORAL
Qty: 90 TABLET | Refills: 0 | Status: SHIPPED | OUTPATIENT
Start: 2023-05-15

## 2023-05-24 ENCOUNTER — LAB (OUTPATIENT)
Dept: OTHER | Facility: HOSPITAL | Age: 78
End: 2023-05-24
Payer: MEDICARE

## 2023-05-24 ENCOUNTER — OFFICE VISIT (OUTPATIENT)
Dept: ONCOLOGY | Facility: CLINIC | Age: 78
End: 2023-05-24
Payer: MEDICARE

## 2023-05-24 VITALS
SYSTOLIC BLOOD PRESSURE: 153 MMHG | DIASTOLIC BLOOD PRESSURE: 75 MMHG | OXYGEN SATURATION: 98 % | BODY MASS INDEX: 22.28 KG/M2 | TEMPERATURE: 97.8 F | HEART RATE: 63 BPM | WEIGHT: 133.9 LBS | RESPIRATION RATE: 16 BRPM

## 2023-05-24 DIAGNOSIS — D69.3 IMMUNE THROMBOCYTOPENIC PURPURA: Primary | ICD-10-CM

## 2023-05-24 DIAGNOSIS — D50.9 IRON DEFICIENCY ANEMIA, UNSPECIFIED IRON DEFICIENCY ANEMIA TYPE: ICD-10-CM

## 2023-05-24 DIAGNOSIS — D51.3 OTHER DIETARY VITAMIN B12 DEFICIENCY ANEMIA: ICD-10-CM

## 2023-05-24 DIAGNOSIS — D69.3 IMMUNE THROMBOCYTOPENIC PURPURA: ICD-10-CM

## 2023-05-24 LAB
BASOPHILS # BLD AUTO: 0.04 10*3/MM3 (ref 0–0.2)
BASOPHILS NFR BLD AUTO: 0.5 % (ref 0–1.5)
DEPRECATED RDW RBC AUTO: 42.4 FL (ref 37–54)
EOSINOPHIL # BLD AUTO: 0.07 10*3/MM3 (ref 0–0.4)
EOSINOPHIL NFR BLD AUTO: 0.9 % (ref 0.3–6.2)
ERYTHROCYTE [DISTWIDTH] IN BLOOD BY AUTOMATED COUNT: 12.9 % (ref 12.3–15.4)
FERRITIN SERPL-MCNC: 119.4 NG/ML (ref 13–150)
HCT VFR BLD AUTO: 37.9 % (ref 34–46.6)
HGB BLD-MCNC: 12.8 G/DL (ref 12–15.9)
IMM GRANULOCYTES # BLD AUTO: 0.04 10*3/MM3 (ref 0–0.05)
IMM GRANULOCYTES NFR BLD AUTO: 0.5 % (ref 0–0.5)
IRON 24H UR-MRATE: 87 MCG/DL (ref 37–145)
IRON SATN MFR SERPL: 29 % (ref 20–50)
LYMPHOCYTES # BLD AUTO: 1.36 10*3/MM3 (ref 0.7–3.1)
LYMPHOCYTES NFR BLD AUTO: 18.1 % (ref 19.6–45.3)
MCH RBC QN AUTO: 30.3 PG (ref 26.6–33)
MCHC RBC AUTO-ENTMCNC: 33.8 G/DL (ref 31.5–35.7)
MCV RBC AUTO: 89.6 FL (ref 79–97)
MONOCYTES # BLD AUTO: 0.63 10*3/MM3 (ref 0.1–0.9)
MONOCYTES NFR BLD AUTO: 8.4 % (ref 5–12)
NEUTROPHILS NFR BLD AUTO: 5.37 10*3/MM3 (ref 1.7–7)
NEUTROPHILS NFR BLD AUTO: 71.6 % (ref 42.7–76)
NRBC BLD AUTO-RTO: 0 /100 WBC (ref 0–0.2)
PLATELET # BLD AUTO: 162 10*3/MM3 (ref 140–450)
PMV BLD AUTO: 11.5 FL (ref 6–12)
RBC # BLD AUTO: 4.23 10*6/MM3 (ref 3.77–5.28)
TIBC SERPL-MCNC: 304 MCG/DL (ref 298–536)
TRANSFERRIN SERPL-MCNC: 204 MG/DL (ref 200–360)
VIT B12 BLD-MCNC: >2000 PG/ML (ref 211–946)
WBC NRBC COR # BLD: 7.51 10*3/MM3 (ref 3.4–10.8)

## 2023-05-24 PROCEDURE — 82607 VITAMIN B-12: CPT | Performed by: INTERNAL MEDICINE

## 2023-05-24 PROCEDURE — 1126F AMNT PAIN NOTED NONE PRSNT: CPT | Performed by: INTERNAL MEDICINE

## 2023-05-24 PROCEDURE — 3077F SYST BP >= 140 MM HG: CPT | Performed by: INTERNAL MEDICINE

## 2023-05-24 PROCEDURE — 36415 COLL VENOUS BLD VENIPUNCTURE: CPT

## 2023-05-24 PROCEDURE — 1159F MED LIST DOCD IN RCRD: CPT | Performed by: INTERNAL MEDICINE

## 2023-05-24 PROCEDURE — 3078F DIAST BP <80 MM HG: CPT | Performed by: INTERNAL MEDICINE

## 2023-05-24 PROCEDURE — 99214 OFFICE O/P EST MOD 30 MIN: CPT | Performed by: INTERNAL MEDICINE

## 2023-05-24 PROCEDURE — 1160F RVW MEDS BY RX/DR IN RCRD: CPT | Performed by: INTERNAL MEDICINE

## 2023-05-24 PROCEDURE — 83540 ASSAY OF IRON: CPT | Performed by: INTERNAL MEDICINE

## 2023-05-24 PROCEDURE — 85025 COMPLETE CBC W/AUTO DIFF WBC: CPT | Performed by: INTERNAL MEDICINE

## 2023-05-24 PROCEDURE — 84466 ASSAY OF TRANSFERRIN: CPT | Performed by: INTERNAL MEDICINE

## 2023-05-24 PROCEDURE — 82728 ASSAY OF FERRITIN: CPT | Performed by: INTERNAL MEDICINE

## 2023-05-24 NOTE — PROGRESS NOTES
Subjective     CHIEF COMPLAINT:      Chief Complaint   Patient presents with   • Follow-up     No concerns       HISTORY OF PRESENT ILLNESS:     Karly Mcgrath is a 77 y.o. female patient who returns today for follow up on her thrombocytopenia and anemia.  She returns today for follow-up reporting no problems with bleeding or bruising.  She is taking multivitamin with iron once daily.  She is taking vitamin B-12 daily.  She is not noticing any petechiae.  She reports that she noticed that she has more dark skin spots than before but none of them are suspicious.    ROS:  Pertinent ROS is in the HPI.     Past medical, surgical, social and family history were reviewed.     MEDICATIONS:    Current Outpatient Medications:   •  amLODIPine (NORVASC) 2.5 MG tablet, TAKE ONE TABLET BY MOUTH DAILY, Disp: 30 tablet, Rfl: 5  •  atorvastatin (LIPITOR) 10 MG tablet, TAKE ONE TABLET BY MOUTH DAILY, Disp: 90 tablet, Rfl: 0  •  azilsartan medoxomil (Edarbi) 80 MG tablet tablet, Take 1 tablet by mouth Daily., Disp: 90 tablet, Rfl: 2  •  Biotin 5000 MCG tablet, Take 5,000 mcg by mouth Daily., Disp: , Rfl:   •  busPIRone (BUSPAR) 5 MG tablet, Take 1 tablet by mouth 3 (Three) Times a Day., Disp: 180 tablet, Rfl: 2  •  Calcium Carb-Cholecalciferol 600-200 MG-UNIT tablet, Take 2,000 mg by mouth 2 (Two) Times a Day., Disp: , Rfl:   •  cholecalciferol (VITAMIN D3) 1000 units tablet, Take 2 tablets by mouth Daily., Disp: , Rfl:   •  Cyanocobalamin (VITAMIN B 12 PO), Take 1,000 mcg by mouth Daily. Increased to once daily on 11/30/2022, Disp: , Rfl:   •  levothyroxine (SYNTHROID, LEVOTHROID) 75 MCG tablet, TAKE ONE TABLET BY MOUTH DAILY, Disp: 90 tablet, Rfl: 0  •  metoprolol succinate XL (TOPROL-XL) 50 MG 24 hr tablet, TAKE ONE TABLET BY MOUTH DAILY, Disp: 90 tablet, Rfl: 1  •  Multiple Vitamins-Iron (One Daily Multivitamin/Iron) tablet, Take 1 tablet by mouth Daily., Disp: , Rfl:   •  acyclovir (ZOVIRAX) 5 % ointment, Apply  topically to the  appropriate area as directed 5 (Five) Times a Day As Needed (cold sore). (Patient not taking: Reported on 4/28/2023), Disp: 15 g, Rfl: 1  •  clotrimazole-betamethasone (Lotrisone) 1-0.05 % cream, Apply 1 application topically to the appropriate area as directed 2 (Two) Times a Day. (Patient not taking: Reported on 4/28/2023), Disp: 15 g, Rfl: 0  •  methylPREDNISolone (MEDROL) 4 MG dose pack, Take as directed on package instructions. (Patient not taking: Reported on 4/28/2023), Disp: 21 tablet, Rfl: 0  Objective     VITAL SIGNS:     Vitals:    05/24/23 1102   BP: 153/75   Pulse: 63   Resp: 16   Temp: 97.8 °F (36.6 °C)   TempSrc: Temporal   SpO2: 98%   Weight: 60.7 kg (133 lb 14.4 oz)   PainSc: 0-No pain     Body mass index is 22.28 kg/m².     Wt Readings from Last 5 Encounters:   05/24/23 60.7 kg (133 lb 14.4 oz)   04/28/23 64.4 kg (142 lb)   11/30/22 64.7 kg (142 lb 9.6 oz)   10/31/22 63.3 kg (139 lb 8 oz)   06/30/22 62.1 kg (136 lb 12.8 oz)     PHYSICAL EXAMINATION:   GENERAL: The patient appears in good general condition, not in acute distress.   SKIN: No ecchymosis.  No petechiae.  EYES: No jaundice. No pallor.  LYMPHATICS: No cervical, supraclavicular or axillary lymphadenopathy.  CHEST: Normal respiratory effort.    CVS: No edema.  ABDOMEN: Soft. No tenderness. No Hepatomegaly. No Splenomegaly. No masses.  EXTREMITIES: No noted deformity.     DIAGNOSTIC DATA:     Results from last 7 days   Lab Units 05/24/23  1056   WBC 10*3/mm3 7.51   NEUTROS ABS 10*3/mm3 5.37   HEMOGLOBIN g/dL 12.8   HEMATOCRIT % 37.9   PLATELETS 10*3/mm3 162         Lab 05/24/23  1056   IRON 87   IRON SATURATION 29   TIBC 304   TRANSFERRIN 204   FERRITIN 119.40   VITAMIN B 12 >2,000*      Assessment & Plan    *Acute thrombocytopenia diagnosed on 9/30/2020.    · Patient has prior history of chronic thrombocytopenia.    · Platelet count was 119,000 on 8/28/2019.   · Platelet count decreased to 44,000 on 9/10/2020.   · Platelet count decreased  to 11,000 on 9/30/2020.   · Flow cytometry on 9/30/2021 revealed no evidence of leukemia or lymphoma.  · Prednisone 1 mg/kg daily was started on 9/30/2020.    · The dose of prednisone was slowly tapered.  · After the dose was reduced to 10 mg a day, platelets decreased to 124,000 on 12/23/2020.  · The dose was increased back to 20 mg a day on 12/23/2020.  · Platelet count improved 159,000 on 1/6/2021.  · The dose was slowly tapered afterwards. Prednisone was stopped on 3/3/2021.  · Platelet count was 146,000 on 11/30/2022.  · Platelet count is 162,000 today..  · She is not having problems with bleeding or bruising.  No petechiae.    *Iron deficiency anemia.  · Hemoglobin was 11.1 on 9/10/2020.  · Patient was started on ferrous sulfate 325 mg daily on 9/30/2020.  · Hemoglobin improved to 14.8 on 1/6/2021. Iron stores improved.   · Ferrous sulfate changed to once weekly on 1/6/2021.  · Patient was tolerating the ferrous sulfate until April 2021 when she developed severe constipation and went to the ER. Ferrous sulfate was stopped.  · Hemoglobin decreased to 12.9 on 6/9/2021. Iron stores decreased.   · Due to her prior problem with severe constipation, we did not recommend treatment with oral iron.  · She was started on multivitamin with iron once daily on 6/9/2021.  · Hemoglobin was 13.1 on 11/24/2021.   · Hemoglobin decreased to 11.5 on 11/30/2022.  · Transferrin saturation decreased to 8%.  Ferritin is elevated at 244 secondary to the acute bronchitis.  · She was advised to take multivitamin with iron once daily.  · Hemoglobin improved to 12.8 today.    *Vitamin B12 deficiency.  · Patient was on B12 1000 mcg orally daily.  · B12 level became elevated at 1947 on 11/11/2020.  · B12 was reduced to once weekly on 10/14/2020.   · Vitamin B12 level decreased to 608 on 6/9/2021.  · Vitamin B12 once weekly was continued.  · Vitamin B12 was 429 on 11/24/2021.    · Vitamin B12 decreased to 393 on 11/30/2022.  · Vitamin B12  was increased to once daily.  · Hemoglobin improved to 12.8 today.    PLAN:    1.  Continue multivitamin with iron once daily.    2.  Continue vitamin B12 1000 mcg daily.    3.  Follow-up in 1 year.  CBC ferritin iron panel vitamin B12 and folate levels will be obtained.  I asked her to notify us sooner if she develops petechiae or if she notices that she is bruising easily.          Garcia Muro MD  05/24/23

## 2023-05-31 RX ORDER — AMLODIPINE BESYLATE 2.5 MG/1
TABLET ORAL
Qty: 30 TABLET | Refills: 5 | Status: SHIPPED | OUTPATIENT
Start: 2023-05-31

## 2023-05-31 NOTE — TELEPHONE ENCOUNTER
Rx Refill Note  Requested Prescriptions     Pending Prescriptions Disp Refills   • amLODIPine (NORVASC) 2.5 MG tablet [Pharmacy Med Name: amLODIPine BESYLATE 2.5 MG TAB] 30 tablet 5     Sig: TAKE ONE TABLET BY MOUTH DAILY      Last office visit with prescribing clinician: 4/28/2023   Last telemedicine visit with prescribing clinician: Visit date not found   Next office visit with prescribing clinician: Visit date not found                         Would you like a call back once the refill request has been completed: [] Yes [] No    If the office needs to give you a call back, can they leave a voicemail: [] Yes [] No    Carmella Macdonald MA  05/31/23, 12:11 EDT

## 2023-07-24 RX ORDER — LEVOTHYROXINE SODIUM 0.07 MG/1
TABLET ORAL
Qty: 90 TABLET | Refills: 1 | Status: SHIPPED | OUTPATIENT
Start: 2023-07-24

## 2023-08-10 ENCOUNTER — HOSPITAL ENCOUNTER (OUTPATIENT)
Dept: BONE DENSITY | Facility: HOSPITAL | Age: 78
Discharge: HOME | End: 2023-08-10
Admitting: FAMILY MEDICINE
Payer: MEDICARE

## 2023-08-10 DIAGNOSIS — M85.89 OTHER SPECIFIED DISORDERS OF BONE DENSITY AND STRUCTURE, MULTIPLE SITES: ICD-10-CM

## 2023-08-10 DIAGNOSIS — M85.80 OSTEOPENIA, UNSPECIFIED LOCATION: ICD-10-CM

## 2023-08-10 PROCEDURE — 77080 DXA BONE DENSITY AXIAL: CPT

## 2023-08-11 RX ORDER — ATORVASTATIN CALCIUM 10 MG/1
TABLET, FILM COATED ORAL
Qty: 90 TABLET | Refills: 0 | Status: SHIPPED | OUTPATIENT
Start: 2023-08-11

## 2023-08-28 ENCOUNTER — OFFICE VISIT (OUTPATIENT)
Dept: INTERNAL MEDICINE | Facility: CLINIC | Age: 78
End: 2023-08-28
Payer: MEDICARE

## 2023-08-28 VITALS
BODY MASS INDEX: 22.66 KG/M2 | OXYGEN SATURATION: 98 % | HEIGHT: 65 IN | SYSTOLIC BLOOD PRESSURE: 154 MMHG | TEMPERATURE: 97.4 F | HEART RATE: 68 BPM | WEIGHT: 136 LBS | DIASTOLIC BLOOD PRESSURE: 62 MMHG

## 2023-08-28 DIAGNOSIS — I10 PRIMARY HYPERTENSION: ICD-10-CM

## 2023-08-28 DIAGNOSIS — E03.9 HYPOTHYROIDISM, UNSPECIFIED TYPE: Primary | ICD-10-CM

## 2023-08-28 DIAGNOSIS — E78.2 MIXED HYPERLIPIDEMIA: ICD-10-CM

## 2023-08-28 LAB
ALBUMIN SERPL-MCNC: 4.4 G/DL (ref 3.5–5.2)
ALBUMIN/GLOB SERPL: 2.3 G/DL
ALP SERPL-CCNC: 83 U/L (ref 39–117)
ALT SERPL-CCNC: 11 U/L (ref 1–33)
AST SERPL-CCNC: 19 U/L (ref 1–32)
BILIRUB SERPL-MCNC: 0.8 MG/DL (ref 0–1.2)
BUN SERPL-MCNC: 14 MG/DL (ref 8–23)
BUN/CREAT SERPL: 16.5 (ref 7–25)
CALCIUM SERPL-MCNC: 9.6 MG/DL (ref 8.6–10.5)
CHLORIDE SERPL-SCNC: 102 MMOL/L (ref 98–107)
CO2 SERPL-SCNC: 26.8 MMOL/L (ref 22–29)
CREAT SERPL-MCNC: 0.85 MG/DL (ref 0.57–1)
EGFRCR SERPLBLD CKD-EPI 2021: 70.2 ML/MIN/1.73
GLOBULIN SER CALC-MCNC: 1.9 GM/DL
GLUCOSE SERPL-MCNC: 91 MG/DL (ref 65–99)
POTASSIUM SERPL-SCNC: 4.9 MMOL/L (ref 3.5–5.2)
PROT SERPL-MCNC: 6.3 G/DL (ref 6–8.5)
SODIUM SERPL-SCNC: 138 MMOL/L (ref 136–145)
T4 FREE SERPL-MCNC: 1.7 NG/DL (ref 0.93–1.7)
TSH SERPL DL<=0.005 MIU/L-ACNC: 0.04 UIU/ML (ref 0.27–4.2)

## 2023-08-28 PROCEDURE — 3077F SYST BP >= 140 MM HG: CPT | Performed by: FAMILY MEDICINE

## 2023-08-28 PROCEDURE — 3078F DIAST BP <80 MM HG: CPT | Performed by: FAMILY MEDICINE

## 2023-08-28 PROCEDURE — 99214 OFFICE O/P EST MOD 30 MIN: CPT | Performed by: FAMILY MEDICINE

## 2023-08-28 RX ORDER — SODIUM CHLORIDE FOR INHALATION 7 %
4 VIAL, NEBULIZER (ML) INHALATION AS NEEDED
COMMUNITY

## 2023-08-28 RX ORDER — ALBUTEROL SULFATE 2.5 MG/3ML
2.5 SOLUTION RESPIRATORY (INHALATION) EVERY 4 HOURS PRN
COMMUNITY

## 2023-08-28 NOTE — PROGRESS NOTES
"Chief Complaint  Hyperlipidemia, Hypertension, and Dizziness (DIZZY SPELLS; FOLLOWING UP ON MEDICATIONS)    Subjective        Karly Mcgrath presents to [facility]  History of Present Illness  Patient follows up for ongoing management of chronic problems of hypothyroidism hypertension hyperlipidemia she has not had any falling episodes she cannot tell if her dizzy spells are related to changes in blood pressure however she has noticed that when she takes the BuSpar with dizziness it seems to improve her symptoms  Home blood pressure readings less than 140/90 with azilsartan atenolol amlodipine  Dentist would like her to be off of amlodipine to help stave off any type of gingival or gum disease which she is experiencing deep cleanings  Objective   Vital Signs:  /62   Pulse 68   Temp 97.4 øF (36.3 øC)   Ht 165.1 cm (65\")   Wt 61.7 kg (136 lb)   SpO2 98%   BMI 22.63 kg/mý   Estimated body mass index is 22.63 kg/mý as calculated from the following:    Height as of this encounter: 165.1 cm (65\").    Weight as of this encounter: 61.7 kg (136 lb).       BMI is within normal parameters. No other follow-up for BMI required.      Physical Exam   Result Review :    Common labs          11/30/2022    11:04 4/28/2023    11:18 5/24/2023    10:56   Common Labs   Glucose  107     BUN  25     Creatinine  0.82     Sodium  133     Potassium  4.8     Chloride  96     Calcium  10.4     Total Protein  6.5     Albumin  4.0     Total Bilirubin  0.7     Alkaline Phosphatase  83     AST (SGOT)  18     ALT (SGPT)  17     WBC 12.66   7.51    Hemoglobin 11.5   12.8    Hematocrit 34.5   37.9    Platelets 146   162                   Assessment and Plan   Diagnoses and all orders for this visit:    1. Hypothyroidism, unspecified type (Primary)  -     TSH  -     T4, Free    2. Primary hypertension  -     Comprehensive Metabolic Panel    3. Mixed hyperlipidemia  -     Comprehensive Metabolic " Panel    Results of testing for further management of chronic problems and discontinue amlodipine monitor blood pressure         Follow Up   Return in about 3 months (around 11/28/2023), or if symptoms worsen or fail to improve, for Recheck.  Patient was given instructions and counseling regarding her condition or for health maintenance advice. Please see specific information pulled into the AVS if appropriate.

## 2023-10-03 ENCOUNTER — OFFICE VISIT (OUTPATIENT)
Dept: INTERNAL MEDICINE | Facility: CLINIC | Age: 78
End: 2023-10-03
Payer: MEDICARE

## 2023-10-03 VITALS
RESPIRATION RATE: 16 BRPM | WEIGHT: 140 LBS | SYSTOLIC BLOOD PRESSURE: 126 MMHG | TEMPERATURE: 97 F | OXYGEN SATURATION: 98 % | HEIGHT: 65 IN | DIASTOLIC BLOOD PRESSURE: 78 MMHG | HEART RATE: 73 BPM | BODY MASS INDEX: 23.32 KG/M2

## 2023-10-03 DIAGNOSIS — M54.32 SCIATICA OF LEFT SIDE: Primary | ICD-10-CM

## 2023-10-03 PROCEDURE — 3078F DIAST BP <80 MM HG: CPT | Performed by: NURSE PRACTITIONER

## 2023-10-03 PROCEDURE — 3074F SYST BP LT 130 MM HG: CPT | Performed by: NURSE PRACTITIONER

## 2023-10-03 PROCEDURE — 99213 OFFICE O/P EST LOW 20 MIN: CPT | Performed by: NURSE PRACTITIONER

## 2023-10-03 PROCEDURE — 1159F MED LIST DOCD IN RCRD: CPT | Performed by: NURSE PRACTITIONER

## 2023-10-03 PROCEDURE — 1160F RVW MEDS BY RX/DR IN RCRD: CPT | Performed by: NURSE PRACTITIONER

## 2023-10-03 RX ORDER — PREDNISONE 10 MG/1
30 TABLET ORAL DAILY
Qty: 15 TABLET | Refills: 0 | Status: SHIPPED | OUTPATIENT
Start: 2023-10-03 | End: 2023-10-08

## 2023-10-03 RX ORDER — CYCLOBENZAPRINE HCL 5 MG
5 TABLET ORAL 2 TIMES DAILY PRN
Qty: 28 TABLET | Refills: 0 | Status: SHIPPED | OUTPATIENT
Start: 2023-10-03 | End: 2023-10-17

## 2023-10-03 NOTE — PROGRESS NOTES
"Chief Complaint  Pain (L Hip started on Navjot 10/1)    Subjective        Karly Mcgrath presents to [facility]  History of Present Illness    Patient is a pleasant 78-year-old female who typically sees Dr. Adame here in the office.  Patient is new to me and presents to the clinic today for L hip/posterior pain and buttock x 2 days with sciatica.  Denies injury or loss of bowel or bladder at this time.  Has had low back pain in the past.  No other problems on today's office visit.      Objective   Vital Signs:  /78   Pulse 73   Temp 97 °F (36.1 °C) (Temporal)   Resp 16   Ht 165 cm (64.96\")   Wt 63.5 kg (140 lb)   SpO2 98%   BMI 23.33 kg/m²   Estimated body mass index is 23.33 kg/m² as calculated from the following:    Height as of this encounter: 165 cm (64.96\").    Weight as of this encounter: 63.5 kg (140 lb).       BMI is within normal parameters. No other follow-up for BMI required.      Physical Exam  Vitals and nursing note reviewed.   Constitutional:       Appearance: Normal appearance.   HENT:      Head: Normocephalic.      Nose: Nose normal.      Mouth/Throat:      Mouth: Mucous membranes are moist.   Eyes:      Pupils: Pupils are equal, round, and reactive to light.   Cardiovascular:      Rate and Rhythm: Normal rate and regular rhythm.      Pulses: Normal pulses.      Heart sounds: Normal heart sounds.      Comments: No peripheral edema  Pulmonary:      Effort: Pulmonary effort is normal. No respiratory distress.      Breath sounds: Normal breath sounds. No stridor. No wheezing, rhonchi or rales.      Comments: Denies shortness of breath  Chest:      Chest wall: No tenderness.   Musculoskeletal:         General: Tenderness present. No swelling or signs of injury. Normal range of motion.      Right lower leg: No edema.      Left lower leg: No edema.   Skin:     General: Skin is warm.      Capillary Refill: Capillary refill takes less than 2 seconds.      Findings: " No signs of injury.             Comments: Pain/sciatica to left buttock area x2 days.  Denies injury   Neurological:      Mental Status: She is alert and oriented to person, place, and time.   Psychiatric:         Behavior: Behavior normal.      Result Review :    Common labs          4/28/2023    11:18 5/24/2023    10:56 8/28/2023    10:33   Common Labs   Glucose 107   91    BUN 25   14    Creatinine 0.82   0.85    Sodium 133   138    Potassium 4.8   4.9    Chloride 96   102    Calcium 10.4   9.6    Total Protein 6.5   6.3    Albumin 4.0   4.4    Total Bilirubin 0.7   0.8    Alkaline Phosphatase 83   83    AST (SGOT) 18   19    ALT (SGPT) 17   11    WBC  7.51     Hemoglobin  12.8     Hematocrit  37.9     Platelets  162                    Assessment and Plan   Diagnoses and all orders for this visit:    1. Sciatica of left side (Primary)    Other orders  -     cyclobenzaprine (FLEXERIL) 5 MG tablet; Take 1 tablet by mouth 2 (Two) Times a Day As Needed for Muscle Spasms for up to 14 days.  Dispense: 28 tablet; Refill: 0  -     predniSONE (DELTASONE) 10 MG tablet; Take 3 tablets by mouth Daily for 5 days.  Dispense: 15 tablet; Refill: 0      Patient will stop at the pharmacy and  her prescriptions for prednisone and Flexeril and take as directed.  Patient can also take over-the-counter Tylenol and/or ibuprofen as needed for pain.  If she develops any worsening symptoms such as loss of bowel or bladder or saddle anesthesia she knows this is a medical emergency and will go to the emergency room.  Patient agrees to treatment plan at this time.     I spent 20 minutes caring for Karly on this date of service. This time includes time spent by me in the following activities:preparing for the visit, reviewing tests, obtaining and/or reviewing a separately obtained history, performing a medically appropriate examination and/or evaluation , counseling and educating the patient/family/caregiver, ordering medications,  tests, or procedures, referring and communicating with other health care professionals , documenting information in the medical record, independently interpreting results and communicating that information with the patient/family/caregiver, and care coordination  Follow Up   Return if symptoms worsen or fail to improve.  Patient was given instructions and counseling regarding her condition or for health maintenance advice. Please see specific information pulled into the AVS if appropriate.

## 2023-10-04 ENCOUNTER — TELEPHONE (OUTPATIENT)
Dept: INTERNAL MEDICINE | Facility: CLINIC | Age: 78
End: 2023-10-04

## 2023-10-04 DIAGNOSIS — I10 PRIMARY HYPERTENSION: ICD-10-CM

## 2023-10-04 RX ORDER — AZILSARTAN KAMEDOXOMIL 80 MG/1
TABLET ORAL
Qty: 90 TABLET | Refills: 2 | Status: SHIPPED | OUTPATIENT
Start: 2023-10-04

## 2023-10-04 NOTE — TELEPHONE ENCOUNTER
Caller: Montana Mcgrath     Relationship: SPOUSE    Best call back number:     [phone]       What is your medical concern? PATIENTS SPOUSE CALLING STATING THAT BOTH ANKLES AND LEGS ARE SWELLED UP. HE WOULD LIKE TO KNOW IF THIS COULD BE DUE TO THE SCIATIC NERVE THAT SHE WAS DIAGNOSED WITH YESTERDAY AT HER APPOINTMENT OR COULD THIS BE SOMETHING ELSE. HE WOULD LIKE TO KNOW IF THE THE PREDNISONE WOULD HELP WITH THE SWELLING.    How long has this issue been going on? HE STATED NOTICED IT YESTERDAY     Is your provider already aware of this issue? N/A    Have you been treated for this issue? N/A

## 2023-10-04 NOTE — PATIENT INSTRUCTIONS
Patient will stop at the pharmacy and  her prescriptions for prednisone and Flexeril and take as directed.  Patient can also take over-the-counter Tylenol and/or ibuprofen as needed for pain.  If she develops any worsening symptoms such as loss of bowel or bladder or saddle anesthesia she knows this is a medical emergency and will go to the emergency room.

## 2023-10-10 ENCOUNTER — OFFICE VISIT (OUTPATIENT)
Dept: INTERNAL MEDICINE | Facility: CLINIC | Age: 78
End: 2023-10-10
Payer: MEDICARE

## 2023-10-10 ENCOUNTER — HOSPITAL ENCOUNTER (OUTPATIENT)
Dept: GENERAL RADIOLOGY | Facility: HOSPITAL | Age: 78
Discharge: HOME | End: 2023-10-10
Admitting: FAMILY MEDICINE
Payer: MEDICARE

## 2023-10-10 VITALS
SYSTOLIC BLOOD PRESSURE: 144 MMHG | OXYGEN SATURATION: 98 % | DIASTOLIC BLOOD PRESSURE: 70 MMHG | HEIGHT: 65 IN | BODY MASS INDEX: 23.66 KG/M2 | HEART RATE: 78 BPM | WEIGHT: 142 LBS | TEMPERATURE: 97 F

## 2023-10-10 DIAGNOSIS — M54.42 ACUTE LEFT-SIDED LOW BACK PAIN WITH LEFT-SIDED SCIATICA: Primary | ICD-10-CM

## 2023-10-10 PROCEDURE — 3078F DIAST BP <80 MM HG: CPT | Performed by: FAMILY MEDICINE

## 2023-10-10 PROCEDURE — 72100 X-RAY EXAM L-S SPINE 2/3 VWS: CPT

## 2023-10-10 PROCEDURE — 3077F SYST BP >= 140 MM HG: CPT | Performed by: FAMILY MEDICINE

## 2023-10-10 PROCEDURE — 99213 OFFICE O/P EST LOW 20 MIN: CPT | Performed by: FAMILY MEDICINE

## 2023-10-10 NOTE — PROGRESS NOTES
"Chief Complaint  Leg Pain (LEFT LEG STARTING AT HIP DOWN TO KNEE;SHARP PAIN WITH MOVEMENT ) and Joint Swelling (ANKLE )    Subjective        Karly Mcgrath presents to [facility]  History of Present Illness  Left leg pain acute approximately 2 to 3 weeks no specific trauma some improvement prednisone no specific benefit from Flexeril  She has no loss of bowel or bladder function she has more pain down her leg in her low back she has no history of chronic low back pain  Objective   Vital Signs:  /70   Pulse 78   Temp 97 øF (36.1 øC)   Ht 165 cm (64.96\")   Wt 64.4 kg (142 lb)   SpO2 98%   BMI 23.66 kg/mý   Estimated body mass index is 23.66 kg/mý as calculated from the following:    Height as of this encounter: 165 cm (64.96\").    Weight as of this encounter: 64.4 kg (142 lb).       BMI is within normal parameters. No other follow-up for BMI required.      Physical Exam  Vitals and nursing note reviewed.   Constitutional:       Appearance: Normal appearance.   Musculoskeletal:        Legs:       Comments: Areas of tenderness  No spinous tenderness   Neurological:      Mental Status: She is alert.      Motor: No weakness.      Coordination: Coordination normal.      Gait: Gait abnormal.        Result Review :    Common labs          4/28/2023    11:18 5/24/2023    10:56 8/28/2023    10:33   Common Labs   Glucose 107   91    BUN 25   14    Creatinine 0.82   0.85    Sodium 133   138    Potassium 4.8   4.9    Chloride 96   102    Calcium 10.4   9.6    Total Protein 6.5   6.3    Albumin 4.0   4.4    Total Bilirubin 0.7   0.8    Alkaline Phosphatase 83   83    AST (SGOT) 18   19    ALT (SGPT) 17   11    WBC  7.51     Hemoglobin  12.8     Hematocrit  37.9     Platelets  162                    Assessment and Plan   Diagnoses and all orders for this visit:    1. Acute left-sided low back pain with left-sided sciatica (Primary)  -     XR Spine Lumbar 2 or 3 View      Stop ibuprofen " start Aleve 1 with breakfast 1 with supper  Follow-up results of x-ray suspect need for physical therapy         Follow Up   Return if symptoms worsen or fail to improve, for Recheck.  Patient was given instructions and counseling regarding her condition or for health maintenance advice. Please see specific information pulled into the AVS if appropriate.

## 2023-10-12 RX ORDER — TRAMADOL HYDROCHLORIDE 50 MG/1
50 TABLET ORAL EVERY 8 HOURS PRN
Qty: 30 TABLET | Refills: 0 | Status: CANCELLED | OUTPATIENT
Start: 2023-10-12

## 2023-10-13 ENCOUNTER — TELEPHONE (OUTPATIENT)
Dept: INTERNAL MEDICINE | Facility: CLINIC | Age: 78
End: 2023-10-13

## 2023-10-13 RX ORDER — TRAMADOL HYDROCHLORIDE 50 MG/1
50 TABLET ORAL EVERY 8 HOURS PRN
Qty: 30 TABLET | Refills: 0 | Status: SHIPPED | OUTPATIENT
Start: 2023-10-13

## 2023-10-13 NOTE — TELEPHONE ENCOUNTER
Caller: Montana Mcgrath    Relationship: Emergency Contact    Best call back number: [phone]    Requested Prescriptions:   Requested Prescriptions      No prescriptions requested or ordered in this encounter        Pharmacy where request should be sent: [facility] [id number] [facility] [id number] [facility] AT [facility] [address] - [phone]  - [phone] FX     Last office visit with prescribing clinician: 10/10/2023   Last telemedicine visit with prescribing clinician: Visit date not found   Next office visit with prescribing clinician: Visit date not found     Additional details provided by patient: PATIENT'S  STATES THAT HE SAW DR. FRIEND THIS WEEK AND IN THE APPOINTMENT, THE PATIENT MENTIONED THAT THE PATIENT NEEDED TRAMADOL. PLEASE SEND THIS ASAP. (NOT ON MED LIST)    Does the patient have less than a 3 day supply:  [x] Yes  [] No    Would you like a call back once the refill request has been completed: [] Yes [x] No    If the office needs to give you a call back, can they leave a voicemail: [] Yes [x] No    Meme Bond Rep   10/13/23 10:11 EDT

## 2023-10-16 ENCOUNTER — TELEPHONE (OUTPATIENT)
Dept: INTERNAL MEDICINE | Facility: CLINIC | Age: 78
End: 2023-10-16
Payer: MEDICARE

## 2023-10-16 DIAGNOSIS — M54.42 ACUTE LEFT-SIDED LOW BACK PAIN WITH LEFT-SIDED SCIATICA: Primary | ICD-10-CM

## 2023-10-16 NOTE — TELEPHONE ENCOUNTER
Caller: Montana Mcgrath    Relationship: Emergency Contact    Best call back number:     [phone] (Mobile)       What is the medical concern/diagnosis: DISC PROBLEMS     What specialty or service is being requested: PHYSICAL THERAPY     What is the provider, practice or medical service name: [facility]     Any additional details: PLEASE ADVISE

## 2023-10-18 NOTE — TELEPHONE ENCOUNTER
Refer patient to [facility] department per patient request   Routing refill request to provider for review/approval because:  Medication is reported/historical    Ingrid Barrios RN

## 2023-10-19 ENCOUNTER — TELEPHONE (OUTPATIENT)
Dept: INTERNAL MEDICINE | Facility: CLINIC | Age: 78
End: 2023-10-19

## 2023-10-19 NOTE — TELEPHONE ENCOUNTER
Caller: OTHER    Relationship: Other    Best call back number: [phone]    What is the best time to reach you: ANYTIME BEFORE 3 PM     Who are you requesting to speak with (clinical staff, provider,  specific staff member): CLINICAL STAFF     Do you know the name of the person who called: DELORIS     What was the call regarding: DELORIS WITH [facility] CALLED AND STATED PATIENT WOULD LIKE TO DO THERAPY WITH [facility].PLEASE CALL     Is it okay if the provider responds through MyChart: N/A

## 2023-11-01 ENCOUNTER — HOSPITAL ENCOUNTER (OUTPATIENT)
Dept: CT IMAGING | Facility: HOSPITAL | Age: 78
Discharge: HOME | End: 2023-11-01
Admitting: INTERNAL MEDICINE
Payer: MEDICARE

## 2023-11-01 DIAGNOSIS — R91.1 LUNG NODULE: ICD-10-CM

## 2023-11-01 DIAGNOSIS — J47.9 BRONCHIECTASIS WITHOUT COMPLICATION: ICD-10-CM

## 2023-11-01 PROCEDURE — 71250 CT THORAX DX C-: CPT

## 2023-11-02 RX ORDER — METOPROLOL SUCCINATE 50 MG/1
TABLET, EXTENDED RELEASE ORAL
Qty: 90 TABLET | Refills: 1 | Status: SHIPPED | OUTPATIENT
Start: 2023-11-02

## 2023-11-04 DIAGNOSIS — F41.9 ANXIETY: ICD-10-CM

## 2023-11-07 RX ORDER — BUSPIRONE HYDROCHLORIDE 5 MG/1
5 TABLET ORAL 3 TIMES DAILY
Qty: 180 TABLET | Refills: 2 | Status: SHIPPED | OUTPATIENT
Start: 2023-11-07

## 2023-11-07 RX ORDER — ATORVASTATIN CALCIUM 10 MG/1
10 TABLET, FILM COATED ORAL DAILY
Qty: 90 TABLET | Refills: 0 | Status: SHIPPED | OUTPATIENT
Start: 2023-11-07

## 2023-11-07 NOTE — TELEPHONE ENCOUNTER
Rx Refill Note  Requested Prescriptions     Pending Prescriptions Disp Refills    busPIRone (BUSPAR) 5 MG tablet [Pharmacy Med Name: busPIRone HCL 5 MG TABLET] 180 tablet 2     Sig: TAKE ONE TABLET BY MOUTH THREE TIMES A DAY      Last office visit with prescribing clinician: 10/10/2023   Last telemedicine visit with prescribing clinician: Visit date not found   Next office visit with prescribing clinician: 11/6/2023

## 2023-11-17 ENCOUNTER — TELEPHONE (OUTPATIENT)
Dept: INTERNAL MEDICINE | Facility: CLINIC | Age: 78
End: 2023-11-17
Payer: MEDICARE

## 2023-11-17 NOTE — TELEPHONE ENCOUNTER
Caller: ARCELIA CATALAN    Relationship: Other    Best call back number: [phone]    What was the call regarding: PATIENT'S DAUGHTER CALLED AND STATED THAT THE PATIENT'S  HAS SPOKEN WITH DR. FRIEND ABOUT HAVING MEMORY PROBLEMS. PATIENT IS GETTING ANGRY, NOT LETTING HER  LEAVE THE HOUSE.     PATIENT'S DAUGHTER WOULD LIKE TO KNOW IF SHE NEEDS A REFERRAL TO SEE ABOUT THIS. PATIENT'S DAUGHTER WOULD LIKE TO KNOW WHAT THE NEXT STEPS ARE FROM HERE. PATIENT'S DAUGHTER WOULD LIKE TO SLOW THE PROCESS DOWN OF WHATEVER IS GOING ON WITH HER MOTHER (ALZIMERS/DEMENTIA). PLEASE ADVISE PATIENT'S DAUGHTER ON NEXT STEPS ASAP.

## 2023-12-15 RX ORDER — AMLODIPINE BESYLATE 2.5 MG/1
2.5 TABLET ORAL DAILY
Qty: 30 TABLET | Refills: 5 | Status: SHIPPED | OUTPATIENT
Start: 2023-12-15

## 2023-12-21 ENCOUNTER — OFFICE VISIT (OUTPATIENT)
Dept: INTERNAL MEDICINE | Facility: CLINIC | Age: 78
End: 2023-12-21
Payer: MEDICARE

## 2023-12-21 ENCOUNTER — HOSPITAL ENCOUNTER (OUTPATIENT)
Dept: GENERAL RADIOLOGY | Facility: HOSPITAL | Age: 78
Discharge: HOME | End: 2023-12-21
Admitting: NURSE PRACTITIONER
Payer: MEDICARE

## 2023-12-21 VITALS
TEMPERATURE: 97.5 F | HEART RATE: 73 BPM | HEIGHT: 65 IN | DIASTOLIC BLOOD PRESSURE: 70 MMHG | BODY MASS INDEX: 23.32 KG/M2 | SYSTOLIC BLOOD PRESSURE: 122 MMHG | OXYGEN SATURATION: 99 % | RESPIRATION RATE: 16 BRPM | WEIGHT: 140 LBS

## 2023-12-21 DIAGNOSIS — J06.9 UPPER RESPIRATORY TRACT INFECTION, UNSPECIFIED TYPE: ICD-10-CM

## 2023-12-21 DIAGNOSIS — J47.9 BRONCHIECTASIS WITHOUT COMPLICATION: ICD-10-CM

## 2023-12-21 DIAGNOSIS — Z09 FOLLOW-UP EXAM: Primary | ICD-10-CM

## 2023-12-21 DIAGNOSIS — M54.6 ACUTE MIDLINE THORACIC BACK PAIN: ICD-10-CM

## 2023-12-21 PROCEDURE — 3074F SYST BP LT 130 MM HG: CPT | Performed by: NURSE PRACTITIONER

## 2023-12-21 PROCEDURE — 1159F MED LIST DOCD IN RCRD: CPT | Performed by: NURSE PRACTITIONER

## 2023-12-21 PROCEDURE — 3078F DIAST BP <80 MM HG: CPT | Performed by: NURSE PRACTITIONER

## 2023-12-21 PROCEDURE — 1160F RVW MEDS BY RX/DR IN RCRD: CPT | Performed by: NURSE PRACTITIONER

## 2023-12-21 PROCEDURE — 72072 X-RAY EXAM THORAC SPINE 3VWS: CPT

## 2023-12-21 RX ORDER — PREDNISONE 10 MG/1
30 TABLET ORAL DAILY
Qty: 15 TABLET | Refills: 0 | Status: SHIPPED | OUTPATIENT
Start: 2023-12-21 | End: 2023-12-26

## 2023-12-21 NOTE — PROGRESS NOTES
"Chief Complaint  Back Pain (Follow up from [facility] 12-16-23)    Subjective        Karly Mcgrath presents to [facility]  History of Present Illness    Patient is a pleasant 78-year-old female who typically sees Dr. Adame here in the office.  Patient presents the clinic today for a urgent care follow-up from 12-16 and 2023 for clinical diagnoses of acute bilateral thoracic back pain with bronchial exacerbation.  Patient has a history of more than 10-day cough with congestion.  Patient was treated with Levaquin and given a chest x-ray at the time of office visit.    Patient was also seen for an injured thoracic spine when she bent over to pick something up.  Patient has been to the chiropractor for treatment.  Patient has been using supportive measures for her thoracic spine injury including Mobic/Flexeril/lidocaine patches.  It is improving slightly however she is having significant pain with light palpation.    Objective   Vital Signs:  /70   Pulse 73   Temp 97.5 °F (36.4 °C)   Resp 16   Ht 165 cm (64.96\")   Wt 63.5 kg (140 lb)   SpO2 99%   BMI 23.33 kg/m²   Estimated body mass index is 23.33 kg/m² as calculated from the following:    Height as of this encounter: 165 cm (64.96\").    Weight as of this encounter: 63.5 kg (140 lb).       BMI is within normal parameters. No other follow-up for BMI required.      Physical Exam  Vitals and nursing note reviewed.   Constitutional:       Appearance: Normal appearance.   HENT:      Head: Normocephalic.      Nose: Nose normal.      Mouth/Throat:      Mouth: Mucous membranes are moist.   Eyes:      Pupils: Pupils are equal, round, and reactive to light.   Cardiovascular:      Rate and Rhythm: Normal rate and regular rhythm.      Pulses: Normal pulses.      Heart sounds: Normal heart sounds.      Comments: No peripheral edema  Pulmonary:      Effort: Pulmonary effort is normal. No respiratory distress.      Breath sounds: Normal breath " sounds. No stridor. No wheezing, rhonchi or rales.      Comments: Denies shortness of breath  Chest:      Chest wall: No tenderness.   Musculoskeletal:         General: Tenderness present.   Skin:     General: Skin is warm.      Capillary Refill: Capillary refill takes less than 2 seconds.             Comments: Tenderness to thoracic spine midline x 1 week   Neurological:      Mental Status: She is alert and oriented to person, place, and time.   Psychiatric:         Behavior: Behavior normal.        Result Review :    Common labs          4/28/2023    11:18 5/24/2023    10:56 8/28/2023    10:33   Common Labs   Glucose 107   91    BUN 25   14    Creatinine 0.82   0.85    Sodium 133   138    Potassium 4.8   4.9    Chloride 96   102    Calcium 10.4   9.6    Total Protein 6.5   6.3    Albumin 4.0   4.4    Total Bilirubin 0.7   0.8    Alkaline Phosphatase 83   83    AST (SGOT) 18   19    ALT (SGPT) 17   11    WBC  7.51     Hemoglobin  12.8     Hematocrit  37.9     Platelets  162                    Assessment and Plan   Diagnoses and all orders for this visit:    1. Follow-up exam (Primary)    2. Upper respiratory tract infection, unspecified type    3. Acute midline thoracic back pain  -     XR Spine Thoracic 3 View  -     Ambulatory Referral to Physical Therapy Evaluate and treat; Heat, Electrotherapy; Ultrasound; Stretching, ROM, Strengthening    4. Bronchiectasis without complication    Other orders  -     predniSONE (DELTASONE) 10 MG tablet; Take 3 tablets by mouth Daily for 5 days.  Dispense: 15 tablet; Refill: 0    Patient will stop at the pharmacy and  her prescription for prednisone to start taking it after her Levaquin has been completed.  This should be completed tomorrow.  I have also ordered an ambulatory referral to physical therapy at this time.  She will also get a thoracic spine x-ray on today's office visit we will contact her with those results.  Patient is aware if she develops any worsening  symptoms such as shortness of breath or worsening pain that she will go to the emergency room.       I spent 35 minutes caring for Karly on this date of service. This time includes time spent by me in the following activities:preparing for the visit, reviewing tests, obtaining and/or reviewing a separately obtained history, performing a medically appropriate examination and/or evaluation , counseling and educating the patient/family/caregiver, ordering medications, tests, or procedures, referring and communicating with other health care professionals , documenting information in the medical record, independently interpreting results and communicating that information with the patient/family/caregiver, and care coordination  Follow Up   Return if symptoms worsen or fail to improve.  Patient was given instructions and counseling regarding her condition or for health maintenance advice. Please see specific information pulled into the AVS if appropriate.

## 2023-12-26 ENCOUNTER — TELEPHONE (OUTPATIENT)
Dept: INTERNAL MEDICINE | Facility: CLINIC | Age: 78
End: 2023-12-26
Payer: MEDICARE

## 2023-12-26 NOTE — TELEPHONE ENCOUNTER
Caller: Karly Mcgrath    Relationship: Self    Best call back number: [phone]    What is the best time to reach you: ANYTIME     Who are you requesting to speak with (clinical staff, provider,  specific staff member): CLINICAL STAFF     Do you know the name of the person who called: SELF     What was the call regarding: PATIENT CALLED AND STATED TO PLEASE GIVE HER A CALL REGARDING TEST RESULTS . PATIENT STATES SHE HAS FURTHER QUESTIONS    Is it okay if the provider responds through aroundthewayhart: YES

## 2023-12-27 DIAGNOSIS — M54.6 ACUTE MIDLINE THORACIC BACK PAIN: Primary | ICD-10-CM

## 2023-12-27 DIAGNOSIS — R93.89 ABNORMAL X-RAY: ICD-10-CM

## 2024-01-11 ENCOUNTER — TRANSCRIBE ORDERS (OUTPATIENT)
Dept: ADMINISTRATIVE | Facility: HOSPITAL | Age: 79
End: 2024-01-11
Payer: MEDICARE

## 2024-01-11 ENCOUNTER — LAB (OUTPATIENT)
Dept: LAB | Facility: HOSPITAL | Age: 79
End: 2024-01-11
Payer: MEDICARE

## 2024-01-11 DIAGNOSIS — M81.0 OSTEOPOROSIS WITHOUT CURRENT PATHOLOGICAL FRACTURE, UNSPECIFIED OSTEOPOROSIS TYPE: Primary | ICD-10-CM

## 2024-01-11 DIAGNOSIS — E55.9 VITAMIN D DEFICIENCY: ICD-10-CM

## 2024-01-11 DIAGNOSIS — M81.0 OSTEOPOROSIS WITHOUT CURRENT PATHOLOGICAL FRACTURE, UNSPECIFIED OSTEOPOROSIS TYPE: ICD-10-CM

## 2024-01-11 LAB
25(OH)D3 SERPL-MCNC: 63.2 NG/ML (ref 30–100)
ALBUMIN SERPL-MCNC: 4.3 G/DL (ref 3.5–5.2)
ALBUMIN/GLOB SERPL: 1.7 G/DL
ALP SERPL-CCNC: 100 U/L (ref 39–117)
ALT SERPL W P-5'-P-CCNC: 15 U/L (ref 1–33)
ANION GAP SERPL CALCULATED.3IONS-SCNC: 10.7 MMOL/L (ref 5–15)
AST SERPL-CCNC: 24 U/L (ref 1–32)
BASOPHILS # BLD AUTO: 0.02 10*3/MM3 (ref 0–0.2)
BASOPHILS NFR BLD AUTO: 0.3 % (ref 0–1.5)
BILIRUB SERPL-MCNC: 0.8 MG/DL (ref 0–1.2)
BUN SERPL-MCNC: 18 MG/DL (ref 8–23)
BUN/CREAT SERPL: 23.4 (ref 7–25)
CALCIUM SPEC-SCNC: 9.7 MG/DL (ref 8.6–10.5)
CALCIUM SPEC-SCNC: 9.7 MG/DL (ref 8.6–10.5)
CHLORIDE SERPL-SCNC: 99 MMOL/L (ref 98–107)
CO2 SERPL-SCNC: 24.3 MMOL/L (ref 22–29)
CREAT SERPL-MCNC: 0.77 MG/DL (ref 0.57–1)
DEPRECATED RDW RBC AUTO: 37.7 FL (ref 37–54)
EGFRCR SERPLBLD CKD-EPI 2021: 78.6 ML/MIN/1.73
EOSINOPHIL # BLD AUTO: 0.09 10*3/MM3 (ref 0–0.4)
EOSINOPHIL NFR BLD AUTO: 1.4 % (ref 0.3–6.2)
ERYTHROCYTE [DISTWIDTH] IN BLOOD BY AUTOMATED COUNT: 12.3 % (ref 12.3–15.4)
GLOBULIN UR ELPH-MCNC: 2.6 GM/DL
GLUCOSE SERPL-MCNC: 92 MG/DL (ref 65–99)
HCT VFR BLD AUTO: 38.6 % (ref 34–46.6)
HGB BLD-MCNC: 13 G/DL (ref 12–15.9)
IMM GRANULOCYTES # BLD AUTO: 0.03 10*3/MM3 (ref 0–0.05)
IMM GRANULOCYTES NFR BLD AUTO: 0.5 % (ref 0–0.5)
LYMPHOCYTES # BLD AUTO: 1.27 10*3/MM3 (ref 0.7–3.1)
LYMPHOCYTES NFR BLD AUTO: 19.3 % (ref 19.6–45.3)
MCH RBC QN AUTO: 28.9 PG (ref 26.6–33)
MCHC RBC AUTO-ENTMCNC: 33.7 G/DL (ref 31.5–35.7)
MCV RBC AUTO: 85.8 FL (ref 79–97)
MONOCYTES # BLD AUTO: 0.47 10*3/MM3 (ref 0.1–0.9)
MONOCYTES NFR BLD AUTO: 7.1 % (ref 5–12)
NEUTROPHILS NFR BLD AUTO: 4.71 10*3/MM3 (ref 1.7–7)
NEUTROPHILS NFR BLD AUTO: 71.4 % (ref 42.7–76)
NRBC BLD AUTO-RTO: 0 /100 WBC (ref 0–0.2)
PLATELET # BLD AUTO: 165 10*3/MM3 (ref 140–450)
PMV BLD AUTO: 11.6 FL (ref 6–12)
POTASSIUM SERPL-SCNC: 4.3 MMOL/L (ref 3.5–5.2)
PROT SERPL-MCNC: 6.9 G/DL (ref 6–8.5)
PTH-INTACT SERPL-MCNC: 42.7 PG/ML (ref 15–65)
RBC # BLD AUTO: 4.5 10*6/MM3 (ref 3.77–5.28)
SODIUM SERPL-SCNC: 134 MMOL/L (ref 136–145)
TSH SERPL DL<=0.05 MIU/L-ACNC: 0.13 UIU/ML (ref 0.27–4.2)
WBC NRBC COR # BLD AUTO: 6.59 10*3/MM3 (ref 3.4–10.8)

## 2024-01-11 PROCEDURE — 84443 ASSAY THYROID STIM HORMONE: CPT

## 2024-01-11 PROCEDURE — 85025 COMPLETE CBC W/AUTO DIFF WBC: CPT

## 2024-01-11 PROCEDURE — 86231 EMA EACH IG CLASS: CPT

## 2024-01-11 PROCEDURE — 83970 ASSAY OF PARATHORMONE: CPT

## 2024-01-11 PROCEDURE — 82306 VITAMIN D 25 HYDROXY: CPT

## 2024-01-11 PROCEDURE — 82784 ASSAY IGA/IGD/IGG/IGM EACH: CPT

## 2024-01-11 PROCEDURE — 86364 TISS TRNSGLTMNASE EA IG CLAS: CPT

## 2024-01-11 PROCEDURE — 36415 COLL VENOUS BLD VENIPUNCTURE: CPT

## 2024-01-11 PROCEDURE — 84165 PROTEIN E-PHORESIS SERUM: CPT

## 2024-01-11 PROCEDURE — 80053 COMPREHEN METABOLIC PANEL: CPT

## 2024-01-12 LAB
ALBUMIN SERPL ELPH-MCNC: 3.7 G/DL (ref 2.9–4.4)
ALBUMIN/GLOB SERPL: 1.4 {RATIO} (ref 0.7–1.7)
ALPHA1 GLOB SERPL ELPH-MCNC: 0.3 G/DL (ref 0–0.4)
ALPHA2 GLOB SERPL ELPH-MCNC: 0.7 G/DL (ref 0.4–1)
B-GLOBULIN SERPL ELPH-MCNC: 0.8 G/DL (ref 0.7–1.3)
ENDOMYSIUM IGA SER QL: NEGATIVE
GAMMA GLOB SERPL ELPH-MCNC: 0.8 G/DL (ref 0.4–1.8)
GLOBULIN SER CALC-MCNC: 2.6 G/DL (ref 2.2–3.9)
IGA SERPL-MCNC: 196 MG/DL (ref 64–422)
LABORATORY COMMENT REPORT: NORMAL
M PROTEIN SERPL ELPH-MCNC: NORMAL G/DL
PROT SERPL-MCNC: 6.3 G/DL (ref 6–8.5)
TTG IGA SER-ACNC: <2 U/ML (ref 0–3)

## 2024-01-16 RX ORDER — LEVOTHYROXINE SODIUM 0.07 MG/1
TABLET ORAL
Qty: 90 TABLET | Refills: 1 | Status: SHIPPED | OUTPATIENT
Start: 2024-01-16

## 2024-01-24 ENCOUNTER — HOSPITAL ENCOUNTER (OUTPATIENT)
Dept: INTERVENTIONAL RADIOLOGY/VASCULAR | Facility: HOSPITAL | Age: 79
Discharge: HOME | End: 2024-01-24
Admitting: PAIN MEDICINE
Payer: MEDICARE

## 2024-01-24 DIAGNOSIS — M48.52XA COLLAPSED VERTEBRA, NOT ELSEWHERE CLASSIFIED, CERVICAL REGION, INITIAL ENCOUNTER FOR FRACTURE: ICD-10-CM

## 2024-01-24 PROCEDURE — G0463 HOSPITAL OUTPT CLINIC VISIT: HCPCS

## 2024-02-05 RX ORDER — ATORVASTATIN CALCIUM 10 MG/1
10 TABLET, FILM COATED ORAL DAILY
Qty: 90 TABLET | Refills: 0 | Status: SHIPPED | OUTPATIENT
Start: 2024-02-05

## 2024-03-01 ENCOUNTER — HOSPITAL ENCOUNTER (OUTPATIENT)
Dept: INTERVENTIONAL RADIOLOGY/VASCULAR | Facility: HOSPITAL | Age: 79
Discharge: HOME | End: 2024-03-01
Payer: MEDICARE

## 2024-03-01 VITALS
HEIGHT: 66 IN | BODY MASS INDEX: 21.69 KG/M2 | SYSTOLIC BLOOD PRESSURE: 148 MMHG | TEMPERATURE: 98.1 F | HEART RATE: 71 BPM | RESPIRATION RATE: 14 BRPM | WEIGHT: 135 LBS | DIASTOLIC BLOOD PRESSURE: 55 MMHG | OXYGEN SATURATION: 99 %

## 2024-03-01 DIAGNOSIS — M80.88XG: ICD-10-CM

## 2024-03-01 DIAGNOSIS — M80.08XA FRACTURE OF VERTEBRA DUE TO OSTEOPOROSIS, INITIAL ENCOUNTER: ICD-10-CM

## 2024-03-01 LAB
APTT PPP: 32.3 SECONDS (ref 24–31)
BASOPHILS # BLD AUTO: 0 10*3/MM3 (ref 0–0.2)
BASOPHILS NFR BLD AUTO: 0.5 % (ref 0–1.5)
DEPRECATED RDW RBC AUTO: 42.4 FL (ref 37–54)
EOSINOPHIL # BLD AUTO: 0.1 10*3/MM3 (ref 0–0.4)
EOSINOPHIL NFR BLD AUTO: 1.3 % (ref 0.3–6.2)
ERYTHROCYTE [DISTWIDTH] IN BLOOD BY AUTOMATED COUNT: 13.4 % (ref 12.3–15.4)
HCT VFR BLD AUTO: 39.3 % (ref 34–46.6)
HGB BLD-MCNC: 12.9 G/DL (ref 12–15.9)
INR PPP: 1.05 (ref 0.93–1.1)
LYMPHOCYTES # BLD AUTO: 1.1 10*3/MM3 (ref 0.7–3.1)
LYMPHOCYTES NFR BLD AUTO: 17.5 % (ref 19.6–45.3)
MCH RBC QN AUTO: 28.8 PG (ref 26.6–33)
MCHC RBC AUTO-ENTMCNC: 32.9 G/DL (ref 31.5–35.7)
MCV RBC AUTO: 87.5 FL (ref 79–97)
MONOCYTES # BLD AUTO: 0.6 10*3/MM3 (ref 0.1–0.9)
MONOCYTES NFR BLD AUTO: 9.2 % (ref 5–12)
NEUTROPHILS NFR BLD AUTO: 4.4 10*3/MM3 (ref 1.7–7)
NEUTROPHILS NFR BLD AUTO: 71.5 % (ref 42.7–76)
NRBC BLD AUTO-RTO: 0 /100 WBC (ref 0–0.2)
PLATELET # BLD AUTO: 154 10*3/MM3 (ref 140–450)
PMV BLD AUTO: 9.6 FL (ref 6–12)
PROTHROMBIN TIME: 11.4 SECONDS (ref 9.6–11.7)
RBC # BLD AUTO: 4.5 10*6/MM3 (ref 3.77–5.28)
WBC NRBC COR # BLD AUTO: 6.2 10*3/MM3 (ref 3.4–10.8)

## 2024-03-01 PROCEDURE — 25010000002 CEFAZOLIN PER 500 MG: Performed by: RADIOLOGY

## 2024-03-01 PROCEDURE — C1713 ANCHOR/SCREW BN/BN,TIS/BN: HCPCS

## 2024-03-01 PROCEDURE — 99152 MOD SED SAME PHYS/QHP 5/>YRS: CPT

## 2024-03-01 PROCEDURE — 85730 THROMBOPLASTIN TIME PARTIAL: CPT | Performed by: RADIOLOGY

## 2024-03-01 PROCEDURE — 22513 PERQ VERTEBRAL AUGMENTATION: CPT

## 2024-03-01 PROCEDURE — 25010000002 FENTANYL CITRATE (PF) 50 MCG/ML SOLUTION: Performed by: RADIOLOGY

## 2024-03-01 PROCEDURE — 25010000002 MIDAZOLAM PER 1 MG: Performed by: RADIOLOGY

## 2024-03-01 PROCEDURE — 85025 COMPLETE CBC W/AUTO DIFF WBC: CPT | Performed by: RADIOLOGY

## 2024-03-01 PROCEDURE — 99153 MOD SED SAME PHYS/QHP EA: CPT

## 2024-03-01 PROCEDURE — 85610 PROTHROMBIN TIME: CPT | Performed by: RADIOLOGY

## 2024-03-01 PROCEDURE — 25010000002 ONDANSETRON PER 1 MG: Performed by: RADIOLOGY

## 2024-03-01 PROCEDURE — 22515 PERQ VERTEBRAL AUGMENTATION: CPT

## 2024-03-01 PROCEDURE — 25810000003 SODIUM CHLORIDE 0.9 % SOLUTION: Performed by: RADIOLOGY

## 2024-03-01 PROCEDURE — 25010000002 DIPHENHYDRAMINE PER 50 MG: Performed by: RADIOLOGY

## 2024-03-01 PROCEDURE — 25010000002 LIDOCAINE 1 % SOLUTION: Performed by: RADIOLOGY

## 2024-03-01 RX ORDER — SODIUM CHLORIDE 9 MG/ML
75 INJECTION, SOLUTION INTRAVENOUS CONTINUOUS
Status: DISCONTINUED | OUTPATIENT
Start: 2024-03-01 | End: 2024-03-02 | Stop reason: HOSPADM

## 2024-03-01 RX ORDER — LIDOCAINE HYDROCHLORIDE 10 MG/ML
INJECTION, SOLUTION INFILTRATION; PERINEURAL AS NEEDED
Status: COMPLETED | OUTPATIENT
Start: 2024-03-01 | End: 2024-03-01

## 2024-03-01 RX ORDER — ONDANSETRON 2 MG/ML
INJECTION INTRAMUSCULAR; INTRAVENOUS AS NEEDED
Status: COMPLETED | OUTPATIENT
Start: 2024-03-01 | End: 2024-03-01

## 2024-03-01 RX ORDER — SODIUM CHLORIDE 0.9 % (FLUSH) 0.9 %
10 SYRINGE (ML) INJECTION AS NEEDED
Status: DISCONTINUED | OUTPATIENT
Start: 2024-03-01 | End: 2024-03-02 | Stop reason: HOSPADM

## 2024-03-01 RX ORDER — SODIUM CHLORIDE 0.9 % (FLUSH) 0.9 %
10 SYRINGE (ML) INJECTION EVERY 12 HOURS SCHEDULED
Status: DISCONTINUED | OUTPATIENT
Start: 2024-03-01 | End: 2024-03-02 | Stop reason: HOSPADM

## 2024-03-01 RX ORDER — FENTANYL CITRATE 50 UG/ML
INJECTION, SOLUTION INTRAMUSCULAR; INTRAVENOUS AS NEEDED
Status: COMPLETED | OUTPATIENT
Start: 2024-03-01 | End: 2024-03-01

## 2024-03-01 RX ORDER — DIPHENHYDRAMINE HYDROCHLORIDE 50 MG/ML
INJECTION INTRAMUSCULAR; INTRAVENOUS AS NEEDED
Status: COMPLETED | OUTPATIENT
Start: 2024-03-01 | End: 2024-03-01

## 2024-03-01 RX ORDER — MIDAZOLAM HYDROCHLORIDE 1 MG/ML
INJECTION INTRAMUSCULAR; INTRAVENOUS AS NEEDED
Status: COMPLETED | OUTPATIENT
Start: 2024-03-01 | End: 2024-03-01

## 2024-03-01 RX ADMIN — FENTANYL CITRATE 100 MCG: 50 INJECTION, SOLUTION INTRAMUSCULAR; INTRAVENOUS at 10:01

## 2024-03-01 RX ADMIN — ONDANSETRON 4 MG: 2 INJECTION INTRAMUSCULAR; INTRAVENOUS at 09:29

## 2024-03-01 RX ADMIN — SODIUM CHLORIDE 75 ML/HR: 9 INJECTION, SOLUTION INTRAVENOUS at 08:10

## 2024-03-01 RX ADMIN — MIDAZOLAM 0.5 MG: 1 INJECTION INTRAMUSCULAR; INTRAVENOUS at 09:58

## 2024-03-01 RX ADMIN — DIPHENHYDRAMINE HYDROCHLORIDE 50 MG: 50 INJECTION, SOLUTION INTRAMUSCULAR; INTRAVENOUS at 10:03

## 2024-03-01 RX ADMIN — MIDAZOLAM 0.5 MG: 1 INJECTION INTRAMUSCULAR; INTRAVENOUS at 09:32

## 2024-03-01 RX ADMIN — MIDAZOLAM 1 MG: 1 INJECTION INTRAMUSCULAR; INTRAVENOUS at 10:01

## 2024-03-01 RX ADMIN — MIDAZOLAM 0.5 MG: 1 INJECTION INTRAMUSCULAR; INTRAVENOUS at 09:52

## 2024-03-01 RX ADMIN — LIDOCAINE HYDROCHLORIDE 13 ML: 10 INJECTION, SOLUTION INFILTRATION; PERINEURAL at 10:10

## 2024-03-01 RX ADMIN — FENTANYL CITRATE 50 MCG: 50 INJECTION, SOLUTION INTRAMUSCULAR; INTRAVENOUS at 09:32

## 2024-03-01 RX ADMIN — CEFAZOLIN 1000 MG: 1 INJECTION, POWDER, FOR SOLUTION INTRAMUSCULAR; INTRAVENOUS at 09:25

## 2024-03-01 RX ADMIN — FENTANYL CITRATE 50 MCG: 50 INJECTION, SOLUTION INTRAMUSCULAR; INTRAVENOUS at 09:58

## 2024-03-01 RX ADMIN — Medication 10 ML: at 08:10

## 2024-03-01 RX ADMIN — FENTANYL CITRATE 50 MCG: 50 INJECTION, SOLUTION INTRAMUSCULAR; INTRAVENOUS at 09:52

## 2024-03-01 RX ADMIN — LIDOCAINE HYDROCHLORIDE 17 ML: 10 INJECTION, SOLUTION INFILTRATION; PERINEURAL at 10:00

## 2024-03-01 NOTE — H&P
[facility]   Interventional Radiology H&P    Patient Name: Karly Mcgrath  : 1945  MRN: [id number]  Primary Care Physician:  Vito Admae MD  Referring Physician: Nicolas Spencer MD  Date of admission: 3/1/2024    Subjective   Subjective     HPI:  Karly Mcgrath is a 78 y.o. female with T9-10 compression fracture    Review of Systems:   Constitutional no fever,  no weight loss       Otolaryngeal no difficulty swallowing   Cardiovascular no chest pain   Pulmonary no cough, no sputum production   Gastrointestinal no constipation, no diarrhea                         Personal History       Past Medical/Surgical History:   Past Medical History:   Diagnosis Date    Anxiety     Basal cell carcinoma     Disease of thyroid gland     Encounter for screening mammogram for malignant neoplasm of breast 10/29/2021    Hyperlipidemia     Hypertension     Maxillary sinusitis 10/08/2015    Resolved    Pneumonia      Past Surgical History:   Procedure Laterality Date    APPENDECTOMY      BRONCHOSCOPY      6-7 YEARS AGO    BRONCHOSCOPY N/A 2018    Procedure: BRONCHOSCOPY WITH BAL;  Surgeon: Josué Espinosa MD;  Location: [facility] ENDOSCOPY;  Service: Pulmonary    COLONOSCOPY      TUBAL ABDOMINAL LIGATION      WRIST SURGERY         Social History:  reports that she has never smoked. She has never used smokeless tobacco. She reports that she does not drink alcohol and does not use drugs.    Medications:  (Not in a hospital admission)    Current medications:  sodium chloride, 10 mL, Intravenous, Q12H      Current IV drips:  sodium chloride, 75 mL/hr        Allergies:  Allergies   Allergen Reactions    Eggs Or Egg-Derived Products Unknown - Low Severity     PATIENT STATES AN ALLERGY TEST SHOWED THIS ALLERGY. PATIENT STATES SHE EATS EGGS AND HAS NEVER HAD PROBLEMS.    Sulfa Antibiotics Unknown - Low Severity     PATIENT CANNOT RECALL REACTION.       Objective    Objective     Vitals:   Temp:  [98.1 °F (36.7  "°C)] 98.1 °F (36.7 °C)  Heart Rate:  [73] 73  Resp:  [24] 24  BP: (159)/(68) 159/68      Physical Exam:   Constitutional: Awake, alert, No acute distress    Respiratory: Clear to auscultation bilaterally, nonlabored respirations    Cardiovascular: RRR, no murmurs, rubs, or gallops, palpable pedal pulses bilaterally   Gastrointestinal: Positive bowel sounds, soft, nontender, nondistended        ASA SCALE ASSESSMENT:  2-Mild to moderate systemic disease, medically well controlled, with no functional limitation    MALLAMPATI CLASSIFICATION:  2-Able to visualize the soft palate, fauces, uvula. The anterior & posterior tonsilar pillars are hidden by the tongue.       Result Review        Result Review:     No results found for: \"NA\"    No results found for: \"K\"    No results found for: \"CL\"    No results found for: \"PLASMABICARB\"    No results found for: \"BUN\"    No results found for: \"CREATININE\"    No results found for: \"CALCIUM\"        No components found for: \"GLUCOSE.*\"  Results from last 7 days   Lab Units 03/01/24  0754   WBC 10*3/mm3 6.20   HEMOGLOBIN g/dL 12.9   HEMATOCRIT % 39.3   PLATELETS 10*3/mm3 154      Results from last 7 days   Lab Units 03/01/24  0754   INR  1.05           Assessment / Plan     Assesment:  T9-10 compression fracture      Plan:   T9-10 kyphoplasty    The risks and benefits of the procedure were discussed with the patient.    Electronically signed by KARRI Aviles, 03/01/24, 9:15 AM EST.  "

## 2024-04-22 ENCOUNTER — OFFICE VISIT (OUTPATIENT)
Dept: INTERNAL MEDICINE | Facility: CLINIC | Age: 79
End: 2024-04-22
Payer: MEDICARE

## 2024-04-22 VITALS
WEIGHT: 130.4 LBS | TEMPERATURE: 97.5 F | SYSTOLIC BLOOD PRESSURE: 122 MMHG | OXYGEN SATURATION: 97 % | DIASTOLIC BLOOD PRESSURE: 66 MMHG | HEIGHT: 65 IN | HEART RATE: 68 BPM | BODY MASS INDEX: 21.73 KG/M2

## 2024-04-22 DIAGNOSIS — E03.9 ACQUIRED HYPOTHYROIDISM: ICD-10-CM

## 2024-04-22 DIAGNOSIS — Z00.00 MEDICARE ANNUAL WELLNESS VISIT, SUBSEQUENT: Primary | ICD-10-CM

## 2024-04-22 DIAGNOSIS — E78.2 MIXED HYPERLIPIDEMIA: ICD-10-CM

## 2024-04-22 DIAGNOSIS — I10 PRIMARY HYPERTENSION: ICD-10-CM

## 2024-04-22 PROBLEM — N34.2 URETHRITIS: Status: RESOLVED | Noted: 2018-07-16 | Resolved: 2024-04-22

## 2024-04-22 PROBLEM — B00.9 HERPES SIMPLEX TYPE 1 INFECTION: Status: RESOLVED | Noted: 2018-02-23 | Resolved: 2024-04-22

## 2024-04-22 PROBLEM — M54.42 ACUTE LEFT-SIDED LOW BACK PAIN WITH LEFT-SIDED SCIATICA: Status: RESOLVED | Noted: 2023-10-10 | Resolved: 2024-04-22

## 2024-04-22 PROBLEM — E87.1 HYPONATREMIA: Status: RESOLVED | Noted: 2022-04-28 | Resolved: 2024-04-22

## 2024-04-22 PROCEDURE — 3074F SYST BP LT 130 MM HG: CPT | Performed by: FAMILY MEDICINE

## 2024-04-22 PROCEDURE — 3078F DIAST BP <80 MM HG: CPT | Performed by: FAMILY MEDICINE

## 2024-04-22 PROCEDURE — G0439 PPPS, SUBSEQ VISIT: HCPCS | Performed by: FAMILY MEDICINE

## 2024-04-22 PROCEDURE — 1170F FXNL STATUS ASSESSED: CPT | Performed by: FAMILY MEDICINE

## 2024-04-22 RX ORDER — ALENDRONATE SODIUM 70 MG/1
70 TABLET ORAL
COMMUNITY
Start: 2024-02-08

## 2024-04-22 RX ORDER — NALOXEGOL OXALATE 12.5 MG/1
TABLET, FILM COATED ORAL
COMMUNITY
Start: 2024-03-06

## 2024-04-22 NOTE — PROGRESS NOTES
The ABCs of the Annual Wellness Visit  Subsequent Medicare Wellness Visit    Subjective      Karly Mcgrath is a 78 y.o. female who presents for a Subsequent Medicare Wellness Visit.  Stable chronic medical problems hypothyroidism hypertension hyperlipidemia    The following portions of the patient's history were reviewed and   updated as appropriate: allergies, current medications, past family history, past medical history, past social history, past surgical history, and problem list.    Compared to one year ago, the patient feels her physical   health is the same.    Compared to one year ago, the patient feels her mental   health is the same.    Recent Hospitalizations:  She was not admitted to the hospital during the last year.       Current Medical Providers:  Patient Care Team:  Vito Adame MD as PCP - General (Family Medicine)  Landry Hoffman MD as PCP - Family Medicine  Vito Adame MD as Referring Physician (Family Medicine)  Garcia Muro MD as Consulting Physician (Hematology and Oncology)    Outpatient Medications Prior to Visit   Medication Sig Dispense Refill    albuterol (PROVENTIL) (2.5 MG/3ML) 0.083% nebulizer solution Take 2.5 mg by nebulization Every 4 (Four) Hours As Needed for Wheezing.      alendronate (FOSAMAX) 70 MG tablet Take 1 tablet by mouth Every 7 (Seven) Days.      amLODIPine (NORVASC) 2.5 MG tablet TAKE 1 TABLET BY MOUTH DAILY 30 tablet 5    atorvastatin (LIPITOR) 10 MG tablet TAKE 1 TABLET BY MOUTH DAILY 90 tablet 0    busPIRone (BUSPAR) 5 MG tablet TAKE ONE TABLET BY MOUTH THREE TIMES A  tablet 2    Calcium Carb-Cholecalciferol 600-200 MG-UNIT tablet Take 2,000 mg by mouth 2 (Two) Times a Day.      Edarbi 80 MG tablet tablet TAKE 1 TABLET BY MOUTH ONCE DAILY 90 tablet 2    levothyroxine (SYNTHROID, LEVOTHROID) 75 MCG tablet TAKE 1 TABLET BY MOUTH DAILY 90 tablet 1    metoprolol succinate XL (TOPROL-XL) 50 MG 24 hr tablet TAKE ONE TABLET BY MOUTH DAILY 90  tablet 1    Multiple Vitamins-Iron (One Daily Multivitamin/Iron) tablet Take 1 tablet by mouth Daily.      sodium chloride 7 % nebulizer solution nebulizer solution Take 4 mL by nebulization As Needed.      Biotin 5000 MCG tablet Take 5,000 mcg by mouth Daily. (Patient not taking: Reported on 12/21/2023)      cholecalciferol (VITAMIN D3) 1000 units tablet Take 2 tablets by mouth Daily. (Patient not taking: Reported on 4/22/2024)      Cyanocobalamin (VITAMIN B 12 PO) Take 1,000 mcg by mouth Daily. Increased to once daily on 11/30/2022 (Patient not taking: Reported on 4/22/2024)      cyclobenzaprine (FLEXERIL) 10 MG tablet Take 1 tablet by mouth 3 (Three) Times a Day As Needed for Muscle Spasms. (Patient not taking: Reported on 2/26/2024) 30 tablet 0    Movantik 12.5 MG tablet  (Patient not taking: Reported on 4/22/2024)      traMADol (ULTRAM) 50 MG tablet Take 1 tablet by mouth Every 8 (Eight) Hours As Needed for Moderate Pain. (Patient not taking: Reported on 2/26/2024) 30 tablet 0    acyclovir (ZOVIRAX) 5 % ointment Apply  topically to the appropriate area as directed 5 (Five) Times a Day As Needed (cold sore). (Patient not taking: Reported on 4/22/2024) 15 g 1    levoFLOXacin (Levaquin) 750 MG tablet Take 1 tablet by mouth Daily. (Patient not taking: Reported on 2/26/2024) 7 tablet 0    meloxicam (Mobic) 15 MG tablet Take 1 tablet by mouth Daily. (Patient not taking: Reported on 2/26/2024) 30 tablet 0     No facility-administered medications prior to visit.       Opioid medication/s are on active medication list.  and I have evaluated her active treatment plan and pain score trends (see table).  There were no vitals filed for this visit.  I have reviewed the chart for potential of high risk medication and harmful drug interactions in the elderly.          Aspirin is not on active medication list.  Aspirin use is not indicated based on review of current medical condition/s. Risk of harm outweighs potential  "benefits.  .    Patient Active Problem List   Diagnosis    Anxiety    Bronchiectasis without complication    Hyperlipidemia    Hypertension    Osteopenia    Hypothyroidism    Health care maintenance    Hypovitaminosis D    Medicare annual wellness visit, initial    Menopausal and postmenopausal disorder    Vegetarian    Thrombocytopenia    Medicare annual wellness visit, subsequent    Anemia due to vitamin B12 deficiency    Immune thrombocytopenic purpura    Other constipation    Encounter for screening mammogram for malignant neoplasm of breast    Dry skin dermatitis     Advance Care Planning   Advance Care Planning     Advance Directive is not on file.  ACP discussion was held with the patient during this visit. Patient does not have an advance directive, information provided.     Objective    Vitals:    24 0926   BP: 122/66   Pulse: 68   Temp: 97.5 °F (36.4 °C)   SpO2: 97%   Weight: 59.1 kg (130 lb 6.4 oz)   Height: 165 cm (64.96\")     Estimated body mass index is 21.73 kg/m² as calculated from the following:    Height as of this encounter: 165 cm (64.96\").    Weight as of this encounter: 59.1 kg (130 lb 6.4 oz).    BMI is within normal parameters. No other follow-up for BMI required.      Does the patient have evidence of cognitive impairment?   No            HEALTH RISK ASSESSMENT    Smoking Status:  Social History     Tobacco Use   Smoking Status Never   Smokeless Tobacco Never     Alcohol Consumption:  Social History     Substance and Sexual Activity   Alcohol Use No     Fall Risk Screen:    STEADI Fall Risk Assessment was completed, and patient is at LOW risk for falls.Assessment completed on:2024    Depression Screenin/22/2024     9:33 AM   PHQ-2/PHQ-9 Depression Screening   Little Interest or Pleasure in Doing Things 0-->not at all   Feeling Down, Depressed or Hopeless 0-->not at all   PHQ-9: Brief Depression Severity Measure Score 0       Health Habits and Functional and Cognitive " Screenin/22/2024     9:33 AM   Functional & Cognitive Status   Do you have difficulty preparing food and eating? No   Do you have difficulty bathing yourself, getting dressed or grooming yourself? No   Do you have difficulty using the toilet? No   Do you have difficulty moving around from place to place? No   Do you have trouble with steps or getting out of a bed or a chair? No   Current Diet Limited Junk Food   Dental Exam Up to date   Eye Exam Up to date   Exercise (times per week) 0 times per week   Current Exercises Include No Regular Exercise   Do you need help using the phone?  No   Are you deaf or do you have serious difficulty hearing?  Yes   Do you need help to go to places out of walking distance? No   Do you need help shopping? No   Do you need help preparing meals?  No   Do you need help with housework?  No   Do you need help with laundry? No   Do you need help taking your medications? No   Do you need help managing money? No   Do you ever drive or ride in a car without wearing a seat belt? No   Have you felt unusual stress, anger or loneliness in the last month? No   Who do you live with? Spouse   If you need help, do you have trouble finding someone available to you? No   Have you been bothered in the last four weeks by sexual problems? No   Do you have difficulty concentrating, remembering or making decisions? No       Age-appropriate Screening Schedule:  Refer to the list below for future screening recommendations based on patient's age, sex and/or medical conditions. Orders for these recommended tests are listed in the plan section. The patient has been provided with a written plan.    Health Maintenance   Topic Date Due    ZOSTER VACCINE (1 of 2) Never done    RSV Vaccine - Adults (1 - 1-dose 60+ series) Never done    HEPATITIS C SCREENING  Never done    COVID-19 Vaccine ( season) 2023    ANNUAL WELLNESS VISIT  10/31/2023    LIPID PANEL  10/31/2023    DXA SCAN  08/10/2025     INFLUENZA VACCINE  Discontinued    Pneumococcal Vaccine 65+  Discontinued    TDAP/TD VACCINES  Discontinued    COLORECTAL CANCER SCREENING  Discontinued                  CMS Preventative Services Quick Reference  Risk Factors Identified During Encounter:    Chronic Pain: Natural history and expected course discussed. Questions answered.  Immunizations Discussed/Encouraged: Shingrix    The above risks/problems have been discussed with the patient.  Pertinent information has been shared with the patient in the After Visit Summary.    Diagnoses and all orders for this visit:    1. Medicare annual wellness visit, subsequent (Primary)    2. Mixed hyperlipidemia  -     Lipid Panel    3. Primary hypertension    4. Acquired hypothyroidism  -     TSH        Follow Up:   Next Medicare Wellness visit to be scheduled in 1 year.      An After Visit Summary and PPPS were made available to the patient.

## 2024-04-23 LAB
CHOLEST SERPL-MCNC: 161 MG/DL (ref 100–199)
HDLC SERPL-MCNC: 74 MG/DL
LDLC SERPL CALC-MCNC: 74 MG/DL (ref 0–99)
TRIGL SERPL-MCNC: 65 MG/DL (ref 0–149)
TSH SERPL DL<=0.005 MIU/L-ACNC: 0.06 UIU/ML (ref 0.45–4.5)
VLDLC SERPL CALC-MCNC: 13 MG/DL (ref 5–40)

## 2024-05-01 RX ORDER — METOPROLOL SUCCINATE 50 MG/1
50 TABLET, EXTENDED RELEASE ORAL DAILY
Qty: 90 TABLET | Refills: 1 | Status: SHIPPED | OUTPATIENT
Start: 2024-05-01

## 2024-05-06 RX ORDER — ATORVASTATIN CALCIUM 10 MG/1
10 TABLET, FILM COATED ORAL DAILY
Qty: 90 TABLET | Refills: 0 | Status: SHIPPED | OUTPATIENT
Start: 2024-05-06

## 2024-05-17 ENCOUNTER — TRANSCRIBE ORDERS (OUTPATIENT)
Dept: ADMINISTRATIVE | Facility: HOSPITAL | Age: 79
End: 2024-05-17
Payer: MEDICARE

## 2024-05-17 DIAGNOSIS — J47.9 ADULT BRONCHIECTASIS: Primary | ICD-10-CM

## 2024-06-11 ENCOUNTER — LAB (OUTPATIENT)
Dept: LAB | Facility: HOSPITAL | Age: 79
End: 2024-06-11
Payer: MEDICARE

## 2024-06-11 ENCOUNTER — OFFICE VISIT (OUTPATIENT)
Dept: INTERNAL MEDICINE | Facility: CLINIC | Age: 79
End: 2024-06-11
Payer: MEDICARE

## 2024-06-11 VITALS
BODY MASS INDEX: 20.09 KG/M2 | HEIGHT: 66 IN | RESPIRATION RATE: 14 BRPM | TEMPERATURE: 96.6 F | HEART RATE: 67 BPM | OXYGEN SATURATION: 98 % | WEIGHT: 125 LBS | SYSTOLIC BLOOD PRESSURE: 122 MMHG | DIASTOLIC BLOOD PRESSURE: 82 MMHG

## 2024-06-11 DIAGNOSIS — I10 PRIMARY HYPERTENSION: ICD-10-CM

## 2024-06-11 DIAGNOSIS — R42 DIZZY SPELLS: Primary | ICD-10-CM

## 2024-06-11 DIAGNOSIS — E03.9 HYPOTHYROIDISM, UNSPECIFIED TYPE: ICD-10-CM

## 2024-06-11 DIAGNOSIS — Z78.9 VEGETARIAN: ICD-10-CM

## 2024-06-11 LAB
ALBUMIN SERPL-MCNC: 4.2 G/DL (ref 3.5–5.2)
ALBUMIN/GLOB SERPL: 1.6 G/DL
ALP SERPL-CCNC: 71 U/L (ref 39–117)
ALT SERPL W P-5'-P-CCNC: 16 U/L (ref 1–33)
ANION GAP SERPL CALCULATED.3IONS-SCNC: 9.4 MMOL/L (ref 5–15)
AST SERPL-CCNC: 27 U/L (ref 1–32)
BASOPHILS # BLD AUTO: 0.01 10*3/MM3 (ref 0–0.2)
BASOPHILS NFR BLD AUTO: 0.2 % (ref 0–1.5)
BILIRUB SERPL-MCNC: 0.7 MG/DL (ref 0–1.2)
BUN SERPL-MCNC: 12 MG/DL (ref 8–23)
BUN/CREAT SERPL: 15.8 (ref 7–25)
CALCIUM SPEC-SCNC: 9.3 MG/DL (ref 8.6–10.5)
CHLORIDE SERPL-SCNC: 96 MMOL/L (ref 98–107)
CO2 SERPL-SCNC: 23.6 MMOL/L (ref 22–29)
CREAT SERPL-MCNC: 0.76 MG/DL (ref 0.57–1)
DEPRECATED RDW RBC AUTO: 37.9 FL (ref 37–54)
EGFRCR SERPLBLD CKD-EPI 2021: 79.8 ML/MIN/1.73
EOSINOPHIL # BLD AUTO: 0.03 10*3/MM3 (ref 0–0.4)
EOSINOPHIL NFR BLD AUTO: 0.7 % (ref 0.3–6.2)
ERYTHROCYTE [DISTWIDTH] IN BLOOD BY AUTOMATED COUNT: 12.4 % (ref 12.3–15.4)
GLOBULIN UR ELPH-MCNC: 2.7 GM/DL
GLUCOSE SERPL-MCNC: 96 MG/DL (ref 65–99)
HCT VFR BLD AUTO: 36.3 % (ref 34–46.6)
HGB BLD-MCNC: 12.6 G/DL (ref 12–15.9)
IMM GRANULOCYTES # BLD AUTO: 0.02 10*3/MM3 (ref 0–0.05)
IMM GRANULOCYTES NFR BLD AUTO: 0.5 % (ref 0–0.5)
LYMPHOCYTES # BLD AUTO: 1.2 10*3/MM3 (ref 0.7–3.1)
LYMPHOCYTES NFR BLD AUTO: 27.7 % (ref 19.6–45.3)
MCH RBC QN AUTO: 29.4 PG (ref 26.6–33)
MCHC RBC AUTO-ENTMCNC: 34.7 G/DL (ref 31.5–35.7)
MCV RBC AUTO: 84.8 FL (ref 79–97)
MONOCYTES # BLD AUTO: 0.47 10*3/MM3 (ref 0.1–0.9)
MONOCYTES NFR BLD AUTO: 10.9 % (ref 5–12)
NEUTROPHILS NFR BLD AUTO: 2.6 10*3/MM3 (ref 1.7–7)
NEUTROPHILS NFR BLD AUTO: 60 % (ref 42.7–76)
NRBC BLD AUTO-RTO: 0 /100 WBC (ref 0–0.2)
PLATELET # BLD AUTO: 96 10*3/MM3 (ref 140–450)
PMV BLD AUTO: 12.8 FL (ref 6–12)
POTASSIUM SERPL-SCNC: 4.2 MMOL/L (ref 3.5–5.2)
PROT SERPL-MCNC: 6.9 G/DL (ref 6–8.5)
RBC # BLD AUTO: 4.28 10*6/MM3 (ref 3.77–5.28)
SODIUM SERPL-SCNC: 129 MMOL/L (ref 136–145)
TSH SERPL DL<=0.05 MIU/L-ACNC: 0.45 UIU/ML (ref 0.27–4.2)
WBC NRBC COR # BLD AUTO: 4.33 10*3/MM3 (ref 3.4–10.8)

## 2024-06-11 PROCEDURE — 80053 COMPREHEN METABOLIC PANEL: CPT | Performed by: FAMILY MEDICINE

## 2024-06-11 PROCEDURE — 83921 ORGANIC ACID SINGLE QUANT: CPT | Performed by: FAMILY MEDICINE

## 2024-06-11 PROCEDURE — 1126F AMNT PAIN NOTED NONE PRSNT: CPT | Performed by: FAMILY MEDICINE

## 2024-06-11 PROCEDURE — 3079F DIAST BP 80-89 MM HG: CPT | Performed by: FAMILY MEDICINE

## 2024-06-11 PROCEDURE — 85025 COMPLETE CBC W/AUTO DIFF WBC: CPT | Performed by: FAMILY MEDICINE

## 2024-06-11 PROCEDURE — 99214 OFFICE O/P EST MOD 30 MIN: CPT | Performed by: FAMILY MEDICINE

## 2024-06-11 PROCEDURE — 36415 COLL VENOUS BLD VENIPUNCTURE: CPT | Performed by: FAMILY MEDICINE

## 2024-06-11 PROCEDURE — 84443 ASSAY THYROID STIM HORMONE: CPT | Performed by: FAMILY MEDICINE

## 2024-06-11 PROCEDURE — 3074F SYST BP LT 130 MM HG: CPT | Performed by: FAMILY MEDICINE

## 2024-06-11 NOTE — PROGRESS NOTES
"Chief Complaint  Dizziness (Few days ) and Nausea    Subjective        Karly Mcgrath presents to [facility]  History of Present Illness  Patient appointment to discuss dizziness she cannot really describe what the dizziness is as there is no spinning there is no nausea there is no lightheadedness just feels off for a few seconds and seems to subside she does not check her blood pressure or heart rate while these episodes happen blood pressure is finally under control with metoprolol and Edarbi and amlodipine she has some over replacement thyroid labs and those have been reduced and she should be taking thyroid 75 mcg daily will be checked again  No specific triggers of her dizziness  Does follow a vegetarian restaurant she thinks she stays well-hydrated drinking 3/2 L bottles of water a day.  She is not falling down  No chest pain no shortness of breath no worsening fatigue  Sometimes she takes buspirone twice a day sometimes 3 times a day she has no any specific relationship with the dizzy episodes of dizziness episodes of been going on once or twice a week over the last couple months  Objective   Vital Signs:  /82 (BP Location: Right arm, Patient Position: Sitting)   Pulse 67   Temp 96.6 °F (35.9 °C)   Resp 14   Ht 167.6 cm (65.98\")   Wt 56.7 kg (125 lb)   SpO2 98%   BMI 20.19 kg/m²   Estimated body mass index is 20.19 kg/m² as calculated from the following:    Height as of this encounter: 167.6 cm (65.98\").    Weight as of this encounter: 56.7 kg (125 lb).       BMI is within normal parameters. No other follow-up for BMI required.      Physical Exam  Vitals reviewed.   Constitutional:       Appearance: Normal appearance.   HENT:      Head: Normocephalic and atraumatic.      Right Ear: Tympanic membrane, ear canal and external ear normal.      Left Ear: Tympanic membrane, ear canal and external ear normal.   Eyes:      General: No scleral icterus.     Extraocular " Movements: Extraocular movements intact.      Pupils: Pupils are equal, round, and reactive to light.   Cardiovascular:      Rate and Rhythm: Normal rate and regular rhythm.   Pulmonary:      Effort: Pulmonary effort is normal.      Breath sounds: Normal breath sounds.   Abdominal:      Tenderness: There is no right CVA tenderness or left CVA tenderness.   Musculoskeletal:      Right lower leg: No edema.      Left lower leg: No edema.   Neurological:      Mental Status: She is alert.   Psychiatric:         Mood and Affect: Mood normal.         Behavior: Behavior normal.         Thought Content: Thought content normal.         Judgment: Judgment normal.        Result Review :      Common labs          1/11/2024    11:22 3/1/2024    07:54 4/22/2024    10:13   Common Labs   Glucose 92      BUN 18      Creatinine 0.77      Sodium 134      Potassium 4.3      Chloride 99      Calcium 9.7     9.7      Total Protein 6.3      Albumin 4.3     3.7      Total Bilirubin 0.8      Alkaline Phosphatase 100      AST (SGOT) 24      ALT (SGPT) 15      WBC 6.59  6.20     Hemoglobin 13.0  12.9     Hematocrit 38.6  39.3     Platelets 165  154     Total Cholesterol   161    Triglycerides   65    HDL Cholesterol   74    LDL Cholesterol    74                   Assessment and Plan     Diagnoses and all orders for this visit:    1. Dizzy spells (Primary)  -     Comprehensive Metabolic Panel  -     CBC & Differential    2. Hypothyroidism, unspecified type  -     TSH    3. Vegetarian  -     Cancel: Methylmalonic Acid, Serum  -     Methylmalonic Acid, Serum    4. Primary hypertension  -     Comprehensive Metabolic Panel  -     CBC & Differential    Follow-up results of testing     This patient has a PCP that is the continuing focal point for all health care services, and the patient sees this physician to be evaluated for dizzy. The inherent complexity that this code () captures is not in the clinical condition itself-- dizzy --but rather  "the cognitition of the continued responsibility of being the focal point for all needed services for this patient.\"     Follow Up     Return in about 1 month (around 7/11/2024), or if symptoms worsen or fail to improve, for Recheck.  Patient was given instructions and counseling regarding her condition or for health maintenance advice. Please see specific information pulled into the AVS if appropriate.         "

## 2024-06-17 LAB — METHYLMALONATE SERPL-SCNC: 148 NMOL/L (ref 0–378)

## 2024-06-18 DIAGNOSIS — I10 PRIMARY HYPERTENSION: ICD-10-CM

## 2024-06-18 RX ORDER — AZILSARTAN KAMEDOXOMIL 80 MG/1
TABLET ORAL
Qty: 90 TABLET | Refills: 2 | Status: SHIPPED | OUTPATIENT
Start: 2024-06-18

## 2024-06-19 RX ORDER — AMLODIPINE BESYLATE 2.5 MG/1
2.5 TABLET ORAL DAILY
Qty: 90 TABLET | Refills: 1 | Status: SHIPPED | OUTPATIENT
Start: 2024-06-19

## 2024-07-12 RX ORDER — LEVOTHYROXINE SODIUM 0.07 MG/1
TABLET ORAL
Qty: 90 TABLET | Refills: 1 | Status: SHIPPED | OUTPATIENT
Start: 2024-07-12

## 2024-07-12 NOTE — TELEPHONE ENCOUNTER
Rx Refill Note  Requested Prescriptions     Pending Prescriptions Disp Refills    levothyroxine (SYNTHROID, LEVOTHROID) 75 MCG tablet [Pharmacy Med Name: LEVOTHYROXINE 75 MCG TABLET] 90 tablet 1     Sig: TAKE 1 TABLET BY MOUTH DAILY      Last office visit with prescribing clinician: 6/11/2024   Last telemedicine visit with prescribing clinician: Visit date not found   Next office visit with prescribing clinician: Visit date not found                         Would you like a call back once the refill request has been completed: [] Yes [] No    If the office needs to give you a call back, can they leave a voicemail: [] Yes [] No    Carmella Macdonald MA  07/12/24, 08:07 EDT

## 2024-08-09 RX ORDER — ATORVASTATIN CALCIUM 10 MG/1
10 TABLET, FILM COATED ORAL DAILY
Qty: 90 TABLET | Refills: 0 | Status: SHIPPED | OUTPATIENT
Start: 2024-08-09

## 2024-08-09 NOTE — TELEPHONE ENCOUNTER
Rx Refill Note  Requested Prescriptions     Pending Prescriptions Disp Refills    atorvastatin (LIPITOR) 10 MG tablet [Pharmacy Med Name: ATORVASTATIN 10 MG TABLET] 90 tablet 0     Sig: TAKE 1 TABLET BY MOUTH DAILY      Last office visit with prescribing clinician: 6/11/2024   Last telemedicine visit with prescribing clinician: Visit date not found   Next office visit with prescribing clinician: Visit date not found                         Would you like a call back once the refill request has been completed: [] Yes [] No    If the office needs to give you a call back, can they leave a voicemail: [] Yes [] No    Carmella Macdonald MA  08/09/24, 07:54 EDT

## 2024-10-30 RX ORDER — METOPROLOL SUCCINATE 50 MG/1
50 TABLET, EXTENDED RELEASE ORAL DAILY
Qty: 90 TABLET | Refills: 1 | Status: SHIPPED | OUTPATIENT
Start: 2024-10-30

## 2024-11-11 RX ORDER — ATORVASTATIN CALCIUM 10 MG/1
10 TABLET, FILM COATED ORAL DAILY
Qty: 90 TABLET | Refills: 0 | Status: SHIPPED | OUTPATIENT
Start: 2024-11-11

## 2024-11-15 ENCOUNTER — TRANSCRIBE ORDERS (OUTPATIENT)
Dept: ADMINISTRATIVE | Facility: HOSPITAL | Age: 79
End: 2024-11-15
Payer: MEDICARE

## 2024-11-15 DIAGNOSIS — R91.8 PULMONARY INFILTRATES: Primary | ICD-10-CM

## 2024-11-22 DIAGNOSIS — F41.9 ANXIETY: ICD-10-CM

## 2024-11-22 RX ORDER — BUSPIRONE HYDROCHLORIDE 5 MG/1
5 TABLET ORAL 3 TIMES DAILY
Qty: 180 TABLET | Refills: 2 | Status: SHIPPED | OUTPATIENT
Start: 2024-11-22

## 2024-12-23 RX ORDER — AMLODIPINE BESYLATE 2.5 MG/1
2.5 TABLET ORAL DAILY
Qty: 90 TABLET | Refills: 1 | Status: SHIPPED | OUTPATIENT
Start: 2024-12-23

## 2025-01-15 RX ORDER — LEVOTHYROXINE SODIUM 75 UG/1
TABLET ORAL
Qty: 90 TABLET | Refills: 1 | Status: SHIPPED | OUTPATIENT
Start: 2025-01-15

## 2025-02-10 RX ORDER — ATORVASTATIN CALCIUM 10 MG/1
10 TABLET, FILM COATED ORAL DAILY
Qty: 90 TABLET | Refills: 0 | Status: SHIPPED | OUTPATIENT
Start: 2025-02-10

## 2025-03-11 DIAGNOSIS — I10 PRIMARY HYPERTENSION: ICD-10-CM

## 2025-03-11 RX ORDER — AZILSARTAN KAMEDOXOMIL 80 MG/1
80 TABLET ORAL DAILY
Qty: 90 TABLET | Refills: 2 | Status: SHIPPED | OUTPATIENT
Start: 2025-03-11

## 2025-04-04 ENCOUNTER — TRANSCRIBE ORDERS (OUTPATIENT)
Dept: ADMINISTRATIVE | Facility: HOSPITAL | Age: 80
End: 2025-04-04
Payer: MEDICARE

## 2025-04-04 DIAGNOSIS — M81.0 SENILE OSTEOPOROSIS: Primary | ICD-10-CM

## 2025-05-01 RX ORDER — METOPROLOL SUCCINATE 50 MG/1
50 TABLET, EXTENDED RELEASE ORAL DAILY
Qty: 90 TABLET | Refills: 1 | Status: SHIPPED | OUTPATIENT
Start: 2025-05-01

## 2025-05-09 ENCOUNTER — HOSPITAL ENCOUNTER (OUTPATIENT)
Dept: CT IMAGING | Facility: HOSPITAL | Age: 80
Discharge: HOME | End: 2025-05-09
Payer: MEDICARE

## 2025-05-09 DIAGNOSIS — R91.8 PULMONARY INFILTRATES: ICD-10-CM

## 2025-05-09 PROCEDURE — 71250 CT THORAX DX C-: CPT

## 2025-05-12 RX ORDER — ATORVASTATIN CALCIUM 10 MG/1
10 TABLET, FILM COATED ORAL DAILY
Qty: 90 TABLET | Refills: 0 | Status: SHIPPED | OUTPATIENT
Start: 2025-05-12

## 2025-05-15 ENCOUNTER — TRANSCRIBE ORDERS (OUTPATIENT)
Dept: ADMINISTRATIVE | Facility: HOSPITAL | Age: 80
End: 2025-05-15
Payer: MEDICARE

## 2025-05-15 DIAGNOSIS — R91.1 PULMONARY NODULE: Primary | ICD-10-CM

## 2025-05-24 DIAGNOSIS — F41.9 ANXIETY: ICD-10-CM

## 2025-05-27 RX ORDER — BUSPIRONE HYDROCHLORIDE 5 MG/1
5 TABLET ORAL 3 TIMES DAILY
Qty: 180 TABLET | Refills: 2 | Status: SHIPPED | OUTPATIENT
Start: 2025-05-27

## 2025-06-24 ENCOUNTER — OFFICE VISIT (OUTPATIENT)
Dept: INTERNAL MEDICINE | Facility: CLINIC | Age: 80
End: 2025-06-24
Payer: MEDICARE

## 2025-06-24 VITALS
HEART RATE: 76 BPM | BODY MASS INDEX: 19.77 KG/M2 | OXYGEN SATURATION: 98 % | DIASTOLIC BLOOD PRESSURE: 62 MMHG | SYSTOLIC BLOOD PRESSURE: 122 MMHG | HEIGHT: 66 IN | WEIGHT: 123 LBS | TEMPERATURE: 97.6 F

## 2025-06-24 DIAGNOSIS — E78.2 MIXED HYPERLIPIDEMIA: ICD-10-CM

## 2025-06-24 DIAGNOSIS — E03.9 HYPOTHYROIDISM, UNSPECIFIED TYPE: ICD-10-CM

## 2025-06-24 DIAGNOSIS — Z00.00 MEDICARE ANNUAL WELLNESS VISIT, SUBSEQUENT: Primary | ICD-10-CM

## 2025-06-24 DIAGNOSIS — E55.9 HYPOVITAMINOSIS D: ICD-10-CM

## 2025-06-24 DIAGNOSIS — R41.3 SHORT-TERM MEMORY LOSS: ICD-10-CM

## 2025-06-24 DIAGNOSIS — I10 PRIMARY HYPERTENSION: ICD-10-CM

## 2025-06-24 PROCEDURE — 1160F RVW MEDS BY RX/DR IN RCRD: CPT | Performed by: FAMILY MEDICINE

## 2025-06-24 PROCEDURE — 1159F MED LIST DOCD IN RCRD: CPT | Performed by: FAMILY MEDICINE

## 2025-06-24 PROCEDURE — 1126F AMNT PAIN NOTED NONE PRSNT: CPT | Performed by: FAMILY MEDICINE

## 2025-06-24 PROCEDURE — 1170F FXNL STATUS ASSESSED: CPT | Performed by: FAMILY MEDICINE

## 2025-06-24 PROCEDURE — 3078F DIAST BP <80 MM HG: CPT | Performed by: FAMILY MEDICINE

## 2025-06-24 PROCEDURE — 99214 OFFICE O/P EST MOD 30 MIN: CPT | Performed by: FAMILY MEDICINE

## 2025-06-24 PROCEDURE — G0439 PPPS, SUBSEQ VISIT: HCPCS | Performed by: FAMILY MEDICINE

## 2025-06-24 PROCEDURE — 3074F SYST BP LT 130 MM HG: CPT | Performed by: FAMILY MEDICINE

## 2025-06-24 PROCEDURE — G2211 COMPLEX E/M VISIT ADD ON: HCPCS | Performed by: FAMILY MEDICINE

## 2025-06-24 NOTE — PROGRESS NOTES
Subjective   The ABCs of the Annual Wellness Visit  Medicare Wellness Visit      Karly Mcgrath is a 79 y.o. patient who presents for a Medicare Wellness Visit.    The following portions of the patient's history were reviewed and   updated as appropriate: allergies, current medications, past family history, past medical history, past social history, past surgical history, and problem list.    Compared to one year ago, the patient's physical   health is the same.  Compared to one year ago, the patient's mental   health is the same.    Recent Hospitalizations:  She was not admitted to the hospital during the last year.     Current Medical Providers:  Patient Care Team:  Vito Adame MD as PCP - General (Family Medicine)  Landry Hoffman MD as PCP - Family Medicine  Vito Adame MD as Referring Physician (Family Medicine)  Garcia Muro MD as Consulting Physician (Hematology and Oncology)    Outpatient Medications Prior to Visit   Medication Sig Dispense Refill    albuterol (PROVENTIL) (2.5 MG/3ML) 0.083% nebulizer solution Take 2.5 mg by nebulization Every 4 (Four) Hours As Needed for Wheezing.      alendronate (FOSAMAX) 70 MG tablet Take 1 tablet by mouth Every 7 (Seven) Days.      amLODIPine (NORVASC) 2.5 MG tablet TAKE 1 TABLET BY MOUTH DAILY 90 tablet 1    atorvastatin (LIPITOR) 10 MG tablet TAKE 1 TABLET BY MOUTH DAILY 90 tablet 0    Biotin 5000 MCG tablet Take 5,000 mcg by mouth Daily.      busPIRone (BUSPAR) 5 MG tablet TAKE 1 TABLET BY MOUTH 3 TIMES A  tablet 2    Calcium Carb-Cholecalciferol 600-200 MG-UNIT tablet Take 2,000 mg by mouth 2 (Two) Times a Day.      cholecalciferol (VITAMIN D3) 1000 units tablet Take 2 tablets by mouth Daily.      Cyanocobalamin (VITAMIN B 12 PO) Take 1,000 mcg by mouth Daily. Increased to once daily on 11/30/2022      Edarbi 80 MG tablet tablet TAKE 1 TABLET BY MOUTH ONCE DAILY 90 tablet 2    levothyroxine (SYNTHROID, LEVOTHROID) 75 MCG tablet TAKE 1  TABLET BY MOUTH DAILY 90 tablet 1    metoprolol succinate XL (TOPROL-XL) 50 MG 24 hr tablet TAKE 1 TABLET BY MOUTH DAILY 90 tablet 1    Movantik 12.5 MG tablet       Multiple Vitamins-Iron (One Daily Multivitamin/Iron) tablet Take 1 tablet by mouth Daily.      sodium chloride 7 % nebulizer solution nebulizer solution Take 4 mL by nebulization As Needed.      traMADol (ULTRAM) 50 MG tablet Take 1 tablet by mouth Every 8 (Eight) Hours As Needed for Moderate Pain. 30 tablet 0    amoxicillin-clavulanate (AUGMENTIN) 875-125 MG per tablet Take 1 tablet by mouth 2 (Two) Times a Day. 20 tablet 0     No facility-administered medications prior to visit.     Opioid medication/s are on active medication list.  and I have evaluated her active treatment plan and pain score trends (see table).  Vitals:    06/24/25 0917   PainSc: 0-No pain     I have reviewed the chart for potential of high risk medication and harmful drug interactions in the elderly.        Aspirin is not on active medication list.  Aspirin use is not indicated based on review of current medical condition/s. Risk of harm outweighs potential benefits.  .    Patient Active Problem List   Diagnosis    Anxiety    Bronchiectasis without complication    Hyperlipidemia    Hypertension    Osteopenia    Hypothyroidism    Health care maintenance    Hypovitaminosis D    Medicare annual wellness visit, initial    Menopausal and postmenopausal disorder    Vegetarian    Thrombocytopenia    Medicare annual wellness visit, subsequent    Anemia due to vitamin B12 deficiency    Immune thrombocytopenic purpura    Other constipation    Encounter for screening mammogram for malignant neoplasm of breast    Dry skin dermatitis    Dizzy spells     Advance Care Planning Advance Directive is not on file.  ACP discussion was held with the patient during this visit. Patient has an advance directive (not in EMR), copy requested.            Objective   Vitals:    06/24/25 0917   BP: 122/62  "  Pulse: 76   Temp: 97.6 °F (36.4 °C)   SpO2: 98%   Weight: 55.8 kg (123 lb)   Height: 167.6 cm (66\")   PainSc: 0-No pain       Estimated body mass index is 19.85 kg/m² as calculated from the following:    Height as of this encounter: 167.6 cm (66\").    Weight as of this encounter: 55.8 kg (123 lb).    BMI is within normal parameters. No other follow-up for BMI required.           Does the patient have evidence of cognitive impairment? No                                                                                               Health  Risk Assessment    Smoking Status:  Social History     Tobacco Use   Smoking Status Never   Smokeless Tobacco Never     Alcohol Consumption:  Social History     Substance and Sexual Activity   Alcohol Use No       Fall Risk Screen  STEADI Fall Risk Assessment was completed, and patient is at LOW risk for falls.Assessment completed on:2025    Depression Screening   Little interest or pleasure in doing things? Not at all   Feeling down, depressed, or hopeless? Not at all   PHQ-2 Total Score 0      Health Habits and Functional and Cognitive Screenin/24/2025     9:19 AM   Functional & Cognitive Status   Do you have difficulty preparing food and eating? No   Do you have difficulty bathing yourself, getting dressed or grooming yourself? No   Do you have difficulty using the toilet? No   Do you have difficulty moving around from place to place? No   Do you have trouble with steps or getting out of a bed or a chair? No   Current Diet Well Balanced Diet   Dental Exam Not up to date   Eye Exam Up to date   Exercise (times per week) 0 times per week   Current Exercises Include No Regular Exercise   Do you need help using the phone?  No   Are you deaf or do you have serious difficulty hearing?  Yes   Do you need help to go to places out of walking distance? No   Do you need help shopping? No   Do you need help preparing meals?  No   Do you need help with housework?  No   Do you " need help with laundry? No   Do you need help taking your medications? No   Do you need help managing money? No   Do you ever drive or ride in a car without wearing a seat belt? No   Have you felt unusual fatigue (could be tiredness), stress, anger or loneliness in the last month? No   Who do you live with? Spouse   If you need help, do you have trouble finding someone available to you? No   Have you been bothered in the last four weeks by sexual problems? No   Do you have difficulty concentrating, remembering or making decisions? Yes           Age-appropriate Screening Schedule:  Refer to the list below for future screening recommendations based on patient's age, sex and/or medical conditions. Orders for these recommended tests are listed in the plan section. The patient has been provided with a written plan.    Health Maintenance List  Health Maintenance   Topic Date Due    RSV Vaccine - Adults (1 - 1-dose 75+ series) Never done    ANNUAL WELLNESS VISIT  04/22/2025    LIPID PANEL  04/22/2025    DXA SCAN  08/10/2025    COVID-19 Vaccine (4 - 2024-25 season) 07/08/2025 (Originally 9/1/2024)    ZOSTER VACCINE  Completed    INFLUENZA VACCINE  Discontinued    Pneumococcal Vaccine 50+  Discontinued    MAMMOGRAM  Discontinued    TDAP/TD VACCINES  Discontinued    COLORECTAL CANCER SCREENING  Discontinued                                                                                                                                                CMS Preventative Services Quick Reference  Risk Factors Identified During Encounter  Immunizations Discussed/Encouraged: RSV (Respiratory Syncytial Virus)  Inactivity/Sedentary: Patient was advised to exercise at least 150 minutes a week per CDC recommendations.    The above risks/problems have been discussed with the patient.  Pertinent information has been shared with the patient in the After Visit Summary.  An After Visit Summary and PPPS were made available to the  patient.    Follow Up:   Next Medicare Wellness visit to be scheduled in 1 year.     Assessment & Plan  Medicare annual wellness visit, subsequent         Mixed hyperlipidemia       Orders:    Lipid Panel    Primary hypertension      Orders:    Comprehensive Metabolic Panel    Hypovitaminosis D    Orders:    Vitamin D,25-Hydroxy    Hypothyroidism, unspecified type    Orders:    TSH         Follow Up:   Return in about 1 year (around 6/24/2026) for Medicare Wellness.

## 2025-06-24 NOTE — PROGRESS NOTES
"Chief Complaint  Hyperlipidemia, Hypertension, and Hypothyroidism    Subjective        Karly Mcgrath presents to [facility]  Hyperlipidemia  Exacerbating diseases include hypothyroidism.   Hypertension  Hypothyroidism  Her past medical history is significant for hyperlipidemia.     Patient appointment to discuss ongoing management of chronic problems of hypothyroidism hypertension hyperlipidemia she has noticed that she has difficulty with short-term memory she has a fairly consistent daily routine she does not exercise regularly, does not check her blood pressure regularly  She feels she has adequate sleep  Objective   Vital Signs:  /62   Pulse 76   Temp 97.6 °F (36.4 °C)   Ht 167.6 cm (66\")   Wt 55.8 kg (123 lb)   SpO2 98%   BMI 19.85 kg/m²   Estimated body mass index is 19.85 kg/m² as calculated from the following:    Height as of this encounter: 167.6 cm (66\").    Weight as of this encounter: 55.8 kg (123 lb).    BMI is within normal parameters. No other follow-up for BMI required.      Physical Exam  Vitals and nursing note reviewed.   Constitutional:       Appearance: Normal appearance. She is well-developed. She is not diaphoretic.   HENT:      Head: Normocephalic and atraumatic.      Right Ear: Tympanic membrane, ear canal and external ear normal.      Left Ear: Tympanic membrane, ear canal and external ear normal.   Eyes:      General: Lids are normal. No scleral icterus.     Extraocular Movements: Extraocular movements intact.      Conjunctiva/sclera: Conjunctivae normal.   Neck:      Thyroid: No thyroid mass or thyromegaly.      Vascular: No carotid bruit or JVD.   Cardiovascular:      Rate and Rhythm: Normal rate and regular rhythm.      Pulses: Normal pulses.           Radial pulses are 2+ on the right side and 2+ on the left side.      Heart sounds: Normal heart sounds. No murmur heard.  Pulmonary:      Effort: Pulmonary effort is normal.      Breath sounds: " Normal breath sounds.   Abdominal:      Palpations: Abdomen is soft.      Tenderness: There is no right CVA tenderness or left CVA tenderness.   Musculoskeletal:      Cervical back: Normal range of motion.      Right lower leg: No edema.      Left lower leg: No edema.   Skin:     General: Skin is warm and dry.      Coloration: Skin is not pale.      Findings: No erythema or rash.   Neurological:      General: No focal deficit present.      Mental Status: She is alert and oriented to person, place, and time.      Sensory: No sensory deficit.      Deep Tendon Reflexes: Reflexes are normal and symmetric.   Psychiatric:         Mood and Affect: Mood normal.         Behavior: Behavior normal. Behavior is cooperative.         Thought Content: Thought content normal.         Judgment: Judgment normal.        Result Review :    June 2024 methylmalonic acid 148 hemoglobin 12.6 hematocrit 36.3            Assessment and Plan   Diagnoses and all orders for this visit:    1.       2. Mixed hyperlipidemia  Assessment & Plan:       Orders:    Lipid Panel      Orders:  -     Lipid Panel    3. Primary hypertension  Assessment & Plan:      Orders:    Comprehensive Metabolic Panel      Orders:  -     Comprehensive Metabolic Panel    4. Hypovitaminosis D  Assessment & Plan:    Orders:    Vitamin D,25-Hydroxy      Orders:  -     Vitamin D,25-Hydroxy    5. Hypothyroidism, unspecified type  Assessment & Plan:    Orders:    TSH      Orders:  -     TSH    6. Short-term memory loss    Follow-up results of testing for further evaluation short-term memory loss  Recommend increasing physical activity  Recommend healthy diet  Recommend learning new things  Recommend change daily routine     This patient has a PCP that is the continuing focal point for all health care services, and the patient sees this physician to be evaluated for memory loss. The inherent complexity that this code () captures is not in the clinical condition itself--  "memory loss --but rather the cognitition of the continued responsibility of being the focal point for all needed services for this patient.\"     Follow Up   Return in about 6 months (around 12/24/2025), or if symptoms worsen or fail to improve.  Patient was given instructions and counseling regarding her condition or for health maintenance advice. Please see specific information pulled into the AVS if appropriate.             "

## 2025-06-25 LAB
25(OH)D3+25(OH)D2 SERPL-MCNC: 129 NG/ML (ref 30–100)
ALBUMIN SERPL-MCNC: 4.2 G/DL (ref 3.5–5.2)
ALBUMIN/GLOB SERPL: 1.9 G/DL
ALP SERPL-CCNC: 83 U/L (ref 39–117)
ALT SERPL-CCNC: 12 U/L (ref 1–33)
AST SERPL-CCNC: 21 U/L (ref 1–32)
BILIRUB SERPL-MCNC: 0.8 MG/DL (ref 0–1.2)
BUN SERPL-MCNC: 27 MG/DL (ref 8–23)
BUN/CREAT SERPL: 21.8 (ref 7–25)
CALCIUM SERPL-MCNC: 9.7 MG/DL (ref 8.6–10.5)
CHLORIDE SERPL-SCNC: 103 MMOL/L (ref 98–107)
CHOLEST SERPL-MCNC: 154 MG/DL (ref 0–200)
CO2 SERPL-SCNC: 25.8 MMOL/L (ref 22–29)
CREAT SERPL-MCNC: 1.24 MG/DL (ref 0.57–1)
EGFRCR SERPLBLD CKD-EPI 2021: 44.1 ML/MIN/1.73
GLOBULIN SER CALC-MCNC: 2.2 GM/DL
GLUCOSE SERPL-MCNC: 93 MG/DL (ref 65–99)
HDLC SERPL-MCNC: 73 MG/DL (ref 40–60)
LDLC SERPL CALC-MCNC: 70 MG/DL (ref 0–100)
POTASSIUM SERPL-SCNC: 4.6 MMOL/L (ref 3.5–5.2)
PROT SERPL-MCNC: 6.4 G/DL (ref 6–8.5)
SODIUM SERPL-SCNC: 139 MMOL/L (ref 136–145)
TRIGL SERPL-MCNC: 55 MG/DL (ref 0–150)
TSH SERPL DL<=0.005 MIU/L-ACNC: 0.07 UIU/ML (ref 0.27–4.2)
VLDLC SERPL CALC-MCNC: 11 MG/DL (ref 5–40)

## 2025-06-26 RX ORDER — AMLODIPINE BESYLATE 2.5 MG/1
2.5 TABLET ORAL DAILY
Qty: 90 TABLET | Refills: 1 | Status: SHIPPED | OUTPATIENT
Start: 2025-06-26

## 2025-06-27 DIAGNOSIS — Z00.00 HEALTH CARE MAINTENANCE: ICD-10-CM

## 2025-06-27 DIAGNOSIS — E03.9 HYPOTHYROIDISM, UNSPECIFIED TYPE: Primary | ICD-10-CM

## 2025-07-25 ENCOUNTER — LAB (OUTPATIENT)
Dept: LAB | Facility: HOSPITAL | Age: 80
End: 2025-07-25
Payer: MEDICARE

## 2025-07-25 DIAGNOSIS — E03.9 HYPOTHYROIDISM, UNSPECIFIED TYPE: ICD-10-CM

## 2025-07-25 DIAGNOSIS — Z00.00 HEALTH CARE MAINTENANCE: ICD-10-CM

## 2025-07-25 LAB
ANION GAP SERPL CALCULATED.3IONS-SCNC: 13 MMOL/L (ref 5–15)
BUN SERPL-MCNC: 23 MG/DL (ref 8–23)
BUN/CREAT SERPL: 16.2 (ref 7–25)
CALCIUM SPEC-SCNC: 9.4 MG/DL (ref 8.6–10.5)
CHLORIDE SERPL-SCNC: 101 MMOL/L (ref 98–107)
CO2 SERPL-SCNC: 23 MMOL/L (ref 22–29)
CREAT SERPL-MCNC: 1.42 MG/DL (ref 0.57–1)
EGFRCR SERPLBLD CKD-EPI 2021: 37.5 ML/MIN/1.73
GLUCOSE SERPL-MCNC: 99 MG/DL (ref 65–99)
POTASSIUM SERPL-SCNC: 5 MMOL/L (ref 3.5–5.2)
SODIUM SERPL-SCNC: 137 MMOL/L (ref 136–145)
T4 FREE SERPL-MCNC: 1.31 NG/DL (ref 0.92–1.68)
TSH SERPL DL<=0.05 MIU/L-ACNC: 0.37 UIU/ML (ref 0.27–4.2)

## 2025-07-25 PROCEDURE — 80048 BASIC METABOLIC PNL TOTAL CA: CPT

## 2025-07-25 PROCEDURE — 84439 ASSAY OF FREE THYROXINE: CPT

## 2025-07-25 PROCEDURE — 84443 ASSAY THYROID STIM HORMONE: CPT

## 2025-08-11 ENCOUNTER — TRANSCRIBE ORDERS (OUTPATIENT)
Dept: ADMINISTRATIVE | Facility: HOSPITAL | Age: 80
End: 2025-08-11
Payer: MEDICARE

## 2025-08-11 DIAGNOSIS — M81.0 SENILE OSTEOPOROSIS: Primary | ICD-10-CM

## 2025-08-11 DIAGNOSIS — Z78.0 POSTMENOPAUSAL: ICD-10-CM

## 2025-08-11 RX ORDER — ATORVASTATIN CALCIUM 10 MG/1
10 TABLET, FILM COATED ORAL DAILY
Qty: 90 TABLET | Refills: 0 | Status: SHIPPED | OUTPATIENT
Start: 2025-08-11

## 2025-08-12 ENCOUNTER — HOSPITAL ENCOUNTER (OUTPATIENT)
Dept: BONE DENSITY | Facility: HOSPITAL | Age: 80
Discharge: HOME | End: 2025-08-12
Admitting: NURSE PRACTITIONER
Payer: MEDICARE

## 2025-08-12 DIAGNOSIS — Z78.0 POSTMENOPAUSAL: ICD-10-CM

## 2025-08-12 DIAGNOSIS — M81.0 SENILE OSTEOPOROSIS: ICD-10-CM

## 2025-08-12 PROCEDURE — 77080 DXA BONE DENSITY AXIAL: CPT

## (undated) DEVICE — SINGLE USE SUCTION VALVE MAJ-209: Brand: SINGLE USE SUCTION VALVE (STERILE)

## (undated) DEVICE — ADAPT SWVL FIBROPTIC BRONCH

## (undated) DEVICE — LN SMPL O2 NASL/ORL SMART/CAPNOLINE PLS A/

## (undated) DEVICE — SINGLE USE BIOPSY VALVE MAJ-210: Brand: SINGLE USE BIOPSY VALVE (STERILE)

## (undated) DEVICE — MSK AIRWY LARYNG LMA UNIQUE STD PK SZ4

## (undated) DEVICE — TRAP,MUCUS SPECIMEN, 80CC: Brand: MEDLINE

## (undated) DEVICE — VITAL SIGNS™ JACKSON-REES CIRCUITS: Brand: VITAL SIGNS™

## (undated) DEVICE — TUBING, SUCTION, 1/4" X 10', STRAIGHT: Brand: MEDLINE